# Patient Record
Sex: FEMALE | Race: WHITE | NOT HISPANIC OR LATINO | Employment: OTHER | URBAN - METROPOLITAN AREA
[De-identification: names, ages, dates, MRNs, and addresses within clinical notes are randomized per-mention and may not be internally consistent; named-entity substitution may affect disease eponyms.]

---

## 2017-05-11 ENCOUNTER — TRANSCRIBE ORDERS (OUTPATIENT)
Dept: ADMINISTRATIVE | Facility: HOSPITAL | Age: 67
End: 2017-05-11

## 2017-05-11 DIAGNOSIS — J98.4 CALCIFICATION OF LUNG: Primary | ICD-10-CM

## 2017-05-15 ENCOUNTER — HOSPITAL ENCOUNTER (OUTPATIENT)
Dept: ULTRASOUND IMAGING | Facility: HOSPITAL | Age: 67
Discharge: HOME | End: 2017-05-15
Admitting: INTERNAL MEDICINE

## 2017-05-15 DIAGNOSIS — J98.4 CALCIFICATION OF LUNG: ICD-10-CM

## 2017-05-15 PROCEDURE — 76536 US EXAM OF HEAD AND NECK: CPT

## 2017-12-11 ENCOUNTER — TRANSCRIBE ORDERS (OUTPATIENT)
Dept: ADMINISTRATIVE | Facility: HOSPITAL | Age: 67
End: 2017-12-11

## 2017-12-11 DIAGNOSIS — E04.1 THYROID NODULE: Primary | ICD-10-CM

## 2017-12-13 ENCOUNTER — HOSPITAL ENCOUNTER (OUTPATIENT)
Dept: ULTRASOUND IMAGING | Facility: HOSPITAL | Age: 67
Discharge: HOME | End: 2017-12-13
Admitting: INTERNAL MEDICINE

## 2017-12-13 DIAGNOSIS — E04.1 THYROID NODULE: ICD-10-CM

## 2017-12-13 PROCEDURE — 76536 US EXAM OF HEAD AND NECK: CPT

## 2018-01-04 ENCOUNTER — OUTSIDE FACILITY SERVICE (OUTPATIENT)
Dept: GASTROENTEROLOGY | Facility: CLINIC | Age: 68
End: 2018-01-04

## 2018-01-04 ENCOUNTER — LAB REQUISITION (OUTPATIENT)
Dept: LAB | Facility: HOSPITAL | Age: 68
End: 2018-01-04

## 2018-01-04 DIAGNOSIS — D12.5 BENIGN NEOPLASM OF SIGMOID COLON: ICD-10-CM

## 2018-01-04 PROCEDURE — 88305 TISSUE EXAM BY PATHOLOGIST: CPT | Performed by: INTERNAL MEDICINE

## 2018-01-04 PROCEDURE — G0500 MOD SEDAT ENDO SERVICE >5YRS: HCPCS | Performed by: INTERNAL MEDICINE

## 2018-01-04 PROCEDURE — 45385 COLONOSCOPY W/LESION REMOVAL: CPT | Performed by: INTERNAL MEDICINE

## 2018-01-05 LAB
CYTO UR: NORMAL
LAB AP CASE REPORT: NORMAL
LAB AP CLINICAL INFORMATION: NORMAL
Lab: NORMAL
PATH REPORT.FINAL DX SPEC: NORMAL
PATH REPORT.GROSS SPEC: NORMAL

## 2018-01-11 ENCOUNTER — TELEPHONE (OUTPATIENT)
Dept: GASTROENTEROLOGY | Facility: CLINIC | Age: 68
End: 2018-01-11

## 2018-01-11 NOTE — TELEPHONE ENCOUNTER
----- Message from Dusty Jimenez MD sent at 1/6/2018 10:12 AM EST -----  Please call Claudia and inform her polyp was benign. Her previous polyps were adenomas and a follow exam in 5 years is indicated.  Dr. Jimenez

## 2019-01-11 ENCOUNTER — TRANSCRIBE ORDERS (OUTPATIENT)
Dept: ADMINISTRATIVE | Facility: HOSPITAL | Age: 69
End: 2019-01-11

## 2019-01-11 DIAGNOSIS — E04.1 THYROID NODULE: Primary | ICD-10-CM

## 2019-01-16 ENCOUNTER — HOSPITAL ENCOUNTER (OUTPATIENT)
Dept: ULTRASOUND IMAGING | Facility: HOSPITAL | Age: 69
Discharge: HOME | End: 2019-01-16
Admitting: INTERNAL MEDICINE

## 2019-01-16 ENCOUNTER — TRANSCRIBE ORDERS (OUTPATIENT)
Dept: ADMINISTRATIVE | Facility: HOSPITAL | Age: 69
End: 2019-01-16

## 2019-01-16 DIAGNOSIS — E04.1 THYROID NODULE: ICD-10-CM

## 2019-01-16 DIAGNOSIS — E04.2 NONTOXIC MULTINODULAR GOITER: Primary | ICD-10-CM

## 2019-01-16 PROCEDURE — 76536 US EXAM OF HEAD AND NECK: CPT

## 2019-01-22 ENCOUNTER — APPOINTMENT (OUTPATIENT)
Dept: ULTRASOUND IMAGING | Facility: HOSPITAL | Age: 69
End: 2019-01-22

## 2019-05-03 ENCOUNTER — TRANSCRIBE ORDERS (OUTPATIENT)
Dept: ADMINISTRATIVE | Facility: HOSPITAL | Age: 69
End: 2019-05-03

## 2019-05-03 DIAGNOSIS — Z87.891 PERSONAL HISTORY OF TOBACCO USE, PRESENTING HAZARDS TO HEALTH: Primary | ICD-10-CM

## 2019-05-24 ENCOUNTER — HOSPITAL ENCOUNTER (OUTPATIENT)
Dept: CT IMAGING | Facility: HOSPITAL | Age: 69
Discharge: HOME | End: 2019-05-24
Admitting: INTERNAL MEDICINE

## 2019-05-24 DIAGNOSIS — Z87.891 PERSONAL HISTORY OF TOBACCO USE, PRESENTING HAZARDS TO HEALTH: ICD-10-CM

## 2019-05-24 PROCEDURE — G0297 LDCT FOR LUNG CA SCREEN: HCPCS

## 2020-07-29 ENCOUNTER — TRANSCRIBE ORDERS (OUTPATIENT)
Dept: ADMINISTRATIVE | Facility: HOSPITAL | Age: 70
End: 2020-07-29

## 2020-07-29 DIAGNOSIS — Z87.891 PERSONAL HISTORY OF TOBACCO USE, PRESENTING HAZARDS TO HEALTH: Primary | ICD-10-CM

## 2020-08-10 ENCOUNTER — HOSPITAL ENCOUNTER (OUTPATIENT)
Dept: CT IMAGING | Facility: HOSPITAL | Age: 70
Discharge: HOME | End: 2020-08-10
Admitting: INTERNAL MEDICINE

## 2020-08-10 DIAGNOSIS — Z87.891 PERSONAL HISTORY OF TOBACCO USE, PRESENTING HAZARDS TO HEALTH: ICD-10-CM

## 2020-08-10 PROCEDURE — G0297 LDCT FOR LUNG CA SCREEN: HCPCS

## 2022-05-13 ENCOUNTER — TRANSCRIBE ORDERS (OUTPATIENT)
Dept: CT IMAGING | Facility: HOSPITAL | Age: 72
End: 2022-05-13

## 2022-05-13 DIAGNOSIS — Z87.891 PERSONAL HISTORY OF TOBACCO USE, PRESENTING HAZARDS TO HEALTH: Primary | ICD-10-CM

## 2022-06-01 ENCOUNTER — HOSPITAL ENCOUNTER (OUTPATIENT)
Dept: CT IMAGING | Facility: HOSPITAL | Age: 72
Discharge: HOME | End: 2022-06-01
Admitting: INTERNAL MEDICINE

## 2022-06-01 DIAGNOSIS — Z87.891 PERSONAL HISTORY OF TOBACCO USE, PRESENTING HAZARDS TO HEALTH: ICD-10-CM

## 2022-06-01 PROCEDURE — 71271 CT THORAX LUNG CANCER SCR C-: CPT

## 2022-07-11 ENCOUNTER — TRANSCRIBE ORDERS (OUTPATIENT)
Dept: ADMINISTRATIVE | Facility: HOSPITAL | Age: 72
End: 2022-07-11

## 2022-07-11 DIAGNOSIS — M81.0 SENILE OSTEOPOROSIS: Primary | ICD-10-CM

## 2022-07-26 ENCOUNTER — TRANSCRIBE ORDERS (OUTPATIENT)
Dept: ADMINISTRATIVE | Facility: HOSPITAL | Age: 72
End: 2022-07-26

## 2022-07-26 DIAGNOSIS — Z87.891 PERSONAL HISTORY OF TOBACCO USE, PRESENTING HAZARDS TO HEALTH: Primary | ICD-10-CM

## 2022-09-09 ENCOUNTER — HOSPITAL ENCOUNTER (OUTPATIENT)
Dept: BONE DENSITY | Facility: HOSPITAL | Age: 72
Discharge: HOME | End: 2022-09-09
Admitting: INTERNAL MEDICINE

## 2022-09-09 DIAGNOSIS — M81.0 SENILE OSTEOPOROSIS: ICD-10-CM

## 2022-09-09 PROCEDURE — 77080 DXA BONE DENSITY AXIAL: CPT

## 2023-04-02 ENCOUNTER — APPOINTMENT (OUTPATIENT)
Dept: GENERAL RADIOLOGY | Facility: HOSPITAL | Age: 73
End: 2023-04-02
Payer: MEDICARE

## 2023-04-02 ENCOUNTER — HOSPITAL ENCOUNTER (EMERGENCY)
Facility: HOSPITAL | Age: 73
Discharge: HOME | End: 2023-04-02
Attending: EMERGENCY MEDICINE | Admitting: EMERGENCY MEDICINE
Payer: MEDICARE

## 2023-04-02 VITALS
HEART RATE: 95 BPM | WEIGHT: 130 LBS | TEMPERATURE: 98.5 F | BODY MASS INDEX: 23.92 KG/M2 | RESPIRATION RATE: 18 BRPM | HEIGHT: 62 IN | DIASTOLIC BLOOD PRESSURE: 85 MMHG | OXYGEN SATURATION: 99 % | SYSTOLIC BLOOD PRESSURE: 175 MMHG

## 2023-04-02 DIAGNOSIS — S56.429A EXTENSOR TENDON LACERATION, FINGER, OPEN WOUND, INITIAL ENCOUNTER: Primary | ICD-10-CM

## 2023-04-02 DIAGNOSIS — S61.212A LACERATION OF RIGHT MIDDLE FINGER WITHOUT FOREIGN BODY WITHOUT DAMAGE TO NAIL, INITIAL ENCOUNTER: ICD-10-CM

## 2023-04-02 DIAGNOSIS — S61.209A EXTENSOR TENDON LACERATION, FINGER, OPEN WOUND, INITIAL ENCOUNTER: Primary | ICD-10-CM

## 2023-04-02 PROCEDURE — 73140 X-RAY EXAM OF FINGER(S): CPT

## 2023-04-02 PROCEDURE — 99282 EMERGENCY DEPT VISIT SF MDM: CPT

## 2023-04-02 RX ORDER — LIDOCAINE HYDROCHLORIDE 10 MG/ML
10 INJECTION, SOLUTION EPIDURAL; INFILTRATION; INTRACAUDAL; PERINEURAL ONCE
Status: COMPLETED | OUTPATIENT
Start: 2023-04-02 | End: 2023-04-02

## 2023-04-02 RX ADMIN — LIDOCAINE HYDROCHLORIDE 10 ML: 10 INJECTION, SOLUTION EPIDURAL; INFILTRATION; INTRACAUDAL; PERINEURAL at 20:40

## 2023-07-25 ENCOUNTER — TRANSCRIBE ORDERS (OUTPATIENT)
Dept: ADMINISTRATIVE | Facility: HOSPITAL | Age: 73
End: 2023-07-25
Payer: MEDICARE

## 2023-07-25 DIAGNOSIS — Z87.891 PERSONAL HISTORY OF NICOTINE DEPENDENCE: Primary | ICD-10-CM

## 2023-08-09 ENCOUNTER — HOSPITAL ENCOUNTER (OUTPATIENT)
Dept: CT IMAGING | Facility: HOSPITAL | Age: 73
Discharge: HOME | End: 2023-08-09
Admitting: INTERNAL MEDICINE
Payer: MEDICARE

## 2023-08-09 DIAGNOSIS — Z87.891 PERSONAL HISTORY OF NICOTINE DEPENDENCE: ICD-10-CM

## 2023-08-09 PROCEDURE — 71271 CT THORAX LUNG CANCER SCR C-: CPT

## 2023-11-14 RX ORDER — SODIUM PICOSULFATE, MAGNESIUM OXIDE, AND ANHYDROUS CITRIC ACID 12; 3.5; 1 G/175ML; G/175ML; MG/175ML
350 LIQUID ORAL ONCE
Qty: 350 ML | Refills: 0 | Status: SHIPPED | OUTPATIENT
Start: 2023-11-14 | End: 2023-11-14

## 2023-11-21 ENCOUNTER — OUTSIDE FACILITY SERVICE (OUTPATIENT)
Dept: GASTROENTEROLOGY | Facility: CLINIC | Age: 73
End: 2023-11-21
Payer: MEDICARE

## 2023-11-21 PROCEDURE — G0500 MOD SEDAT ENDO SERVICE >5YRS: HCPCS | Performed by: INTERNAL MEDICINE

## 2023-11-21 PROCEDURE — 88305 TISSUE EXAM BY PATHOLOGIST: CPT

## 2023-11-21 PROCEDURE — 45385 COLONOSCOPY W/LESION REMOVAL: CPT | Performed by: INTERNAL MEDICINE

## 2023-11-22 ENCOUNTER — LAB REQUISITION (OUTPATIENT)
Dept: LAB | Facility: HOSPITAL | Age: 73
End: 2023-11-22
Payer: MEDICARE

## 2023-11-22 DIAGNOSIS — Z12.11 ENCOUNTER FOR SCREENING FOR MALIGNANT NEOPLASM OF COLON: ICD-10-CM

## 2023-11-24 LAB — REF LAB TEST METHOD: NORMAL

## 2024-02-01 ENCOUNTER — HOSPITAL ENCOUNTER (OUTPATIENT)
Dept: GENERAL RADIOLOGY | Facility: HOSPITAL | Age: 74
Discharge: HOME | End: 2024-02-01
Admitting: INTERNAL MEDICINE
Payer: MEDICARE

## 2024-02-01 ENCOUNTER — TRANSCRIBE ORDERS (OUTPATIENT)
Dept: ADMINISTRATIVE | Facility: HOSPITAL | Age: 74
End: 2024-02-01
Payer: MEDICARE

## 2024-02-01 DIAGNOSIS — M25.552 HIP PAIN, LEFT: Primary | ICD-10-CM

## 2024-02-01 DIAGNOSIS — M25.552 HIP PAIN, LEFT: ICD-10-CM

## 2024-02-01 PROCEDURE — 73502 X-RAY EXAM HIP UNI 2-3 VIEWS: CPT

## 2024-06-03 ENCOUNTER — TRANSCRIBE ORDERS (OUTPATIENT)
Dept: ADMINISTRATIVE | Facility: HOSPITAL | Age: 74
End: 2024-06-03
Payer: MEDICARE

## 2024-06-03 DIAGNOSIS — R10.814 ABDOMINAL TENDERNESS OF LEFT LOWER QUADRANT, REBOUND TENDERNESS PRESENCE NOT SPECIFIED: Primary | ICD-10-CM

## 2024-07-25 ENCOUNTER — TRANSCRIBE ORDERS (OUTPATIENT)
Dept: ADMINISTRATIVE | Facility: HOSPITAL | Age: 74
End: 2024-07-25
Payer: MEDICARE

## 2024-07-25 DIAGNOSIS — Z87.891 PERSONAL HISTORY OF TOBACCO USE, PRESENTING HAZARDS TO HEALTH: Primary | ICD-10-CM

## 2024-08-12 ENCOUNTER — HOSPITAL ENCOUNTER (OUTPATIENT)
Dept: CT IMAGING | Facility: HOSPITAL | Age: 74
Discharge: HOME | End: 2024-08-12
Admitting: INTERNAL MEDICINE
Payer: MEDICARE

## 2024-08-12 DIAGNOSIS — Z87.891 PERSONAL HISTORY OF TOBACCO USE, PRESENTING HAZARDS TO HEALTH: ICD-10-CM

## 2024-08-12 PROCEDURE — 71271 CT THORAX LUNG CANCER SCR C-: CPT

## 2024-09-20 ENCOUNTER — TRANSCRIBE ORDERS (OUTPATIENT)
Dept: ADMINISTRATIVE | Facility: HOSPITAL | Age: 74
End: 2024-09-20
Payer: MEDICARE

## 2024-09-20 DIAGNOSIS — R10.30 LOWER ABDOMINAL PAIN: Primary | ICD-10-CM

## 2024-09-24 ENCOUNTER — HOSPITAL ENCOUNTER (OUTPATIENT)
Dept: CT IMAGING | Facility: HOSPITAL | Age: 74
Discharge: HOME | End: 2024-09-24
Admitting: INTERNAL MEDICINE
Payer: MEDICARE

## 2024-09-24 DIAGNOSIS — R10.30 LOWER ABDOMINAL PAIN: ICD-10-CM

## 2024-09-24 PROCEDURE — 74176 CT ABD & PELVIS W/O CONTRAST: CPT

## 2024-09-25 ENCOUNTER — HOSPITAL ENCOUNTER (OUTPATIENT)
Dept: MRI IMAGING | Facility: HOSPITAL | Age: 74
Discharge: HOME | End: 2024-09-25
Admitting: INTERNAL MEDICINE
Payer: MEDICARE

## 2024-09-25 ENCOUNTER — TRANSCRIBE ORDERS (OUTPATIENT)
Dept: ADMINISTRATIVE | Facility: HOSPITAL | Age: 74
End: 2024-09-25
Payer: MEDICARE

## 2024-09-25 DIAGNOSIS — R93.3 ABNORMAL FINDINGS ON DIAGNOSTIC IMAGING OF DIGESTIVE SYSTEM: Primary | ICD-10-CM

## 2024-09-25 DIAGNOSIS — R93.3 ABNORMAL FINDINGS ON DIAGNOSTIC IMAGING OF DIGESTIVE SYSTEM: ICD-10-CM

## 2024-09-25 PROCEDURE — A9577 INJ MULTIHANCE: HCPCS | Performed by: INTERNAL MEDICINE

## 2024-09-25 PROCEDURE — 0 GADOBENATE DIMEGLUMINE 529 MG/ML SOLUTION: Performed by: INTERNAL MEDICINE

## 2024-09-25 PROCEDURE — 74183 MRI ABD W/O CNTR FLWD CNTR: CPT

## 2024-09-25 RX ADMIN — GADOBENATE DIMEGLUMINE 14 ML: 529 INJECTION, SOLUTION INTRAVENOUS at 21:29

## 2024-10-03 ENCOUNTER — PATIENT OUTREACH (OUTPATIENT)
Dept: OTHER | Facility: HOSPITAL | Age: 74
End: 2024-10-03
Payer: MEDICARE

## 2024-10-03 ENCOUNTER — CONSULT (OUTPATIENT)
Dept: ONCOLOGY | Facility: CLINIC | Age: 74
End: 2024-10-03
Payer: MEDICARE

## 2024-10-03 ENCOUNTER — LAB (OUTPATIENT)
Dept: LAB | Facility: HOSPITAL | Age: 74
End: 2024-10-03
Payer: MEDICARE

## 2024-10-03 VITALS
DIASTOLIC BLOOD PRESSURE: 77 MMHG | HEIGHT: 62 IN | HEART RATE: 98 BPM | WEIGHT: 116 LBS | OXYGEN SATURATION: 98 % | BODY MASS INDEX: 21.35 KG/M2 | TEMPERATURE: 97.5 F | RESPIRATION RATE: 16 BRPM | SYSTOLIC BLOOD PRESSURE: 118 MMHG

## 2024-10-03 DIAGNOSIS — C25.0 MALIGNANT TUMOR OF HEAD OF PANCREAS: Primary | ICD-10-CM

## 2024-10-03 DIAGNOSIS — C25.0 MALIGNANT NEOPLASM OF HEAD OF PANCREAS: Primary | ICD-10-CM

## 2024-10-03 PROCEDURE — 36415 COLL VENOUS BLD VENIPUNCTURE: CPT

## 2024-10-03 RX ORDER — OXYCODONE HYDROCHLORIDE 15 MG/1
15 TABLET ORAL EVERY 6 HOURS PRN
Qty: 120 TABLET | Refills: 0 | Status: SHIPPED | OUTPATIENT
Start: 2024-10-03 | End: 2024-10-04 | Stop reason: SDUPTHER

## 2024-10-03 RX ORDER — SODIUM CHLORIDE 9 MG/ML
20 INJECTION, SOLUTION INTRAVENOUS ONCE
OUTPATIENT
Start: 2024-10-10

## 2024-10-03 RX ORDER — PACLITAXEL 100 MG/20ML
125 INJECTION, POWDER, LYOPHILIZED, FOR SUSPENSION INTRAVENOUS ONCE
OUTPATIENT
Start: 2024-11-21

## 2024-10-03 RX ORDER — LEVOTHYROXINE SODIUM 50 UG/1
50 TABLET ORAL
COMMUNITY

## 2024-10-03 RX ORDER — SODIUM CHLORIDE 9 MG/ML
20 INJECTION, SOLUTION INTRAVENOUS ONCE
OUTPATIENT
Start: 2024-10-24

## 2024-10-03 RX ORDER — PACLITAXEL 100 MG/20ML
125 INJECTION, POWDER, LYOPHILIZED, FOR SUSPENSION INTRAVENOUS ONCE
OUTPATIENT
Start: 2024-10-10

## 2024-10-03 RX ORDER — PACLITAXEL 100 MG/20ML
125 INJECTION, POWDER, LYOPHILIZED, FOR SUSPENSION INTRAVENOUS ONCE
OUTPATIENT
Start: 2024-11-07

## 2024-10-03 RX ORDER — TRAMADOL HYDROCHLORIDE 50 MG/1
1 TABLET ORAL
COMMUNITY
Start: 2024-09-28

## 2024-10-03 RX ORDER — ROSUVASTATIN CALCIUM 5 MG/1
5 TABLET, COATED ORAL DAILY
COMMUNITY

## 2024-10-03 RX ORDER — AMLODIPINE AND BENAZEPRIL HYDROCHLORIDE 5; 10 MG/1; MG/1
1 CAPSULE ORAL DAILY
COMMUNITY
Start: 2022-03-31

## 2024-10-03 RX ORDER — PACLITAXEL 100 MG/20ML
125 INJECTION, POWDER, LYOPHILIZED, FOR SUSPENSION INTRAVENOUS ONCE
OUTPATIENT
Start: 2024-10-24

## 2024-10-03 RX ORDER — SODIUM CHLORIDE 9 MG/ML
20 INJECTION, SOLUTION INTRAVENOUS ONCE
OUTPATIENT
Start: 2024-11-21

## 2024-10-03 RX ORDER — SODIUM CHLORIDE 9 MG/ML
20 INJECTION, SOLUTION INTRAVENOUS ONCE
OUTPATIENT
Start: 2024-11-07

## 2024-10-03 NOTE — PROGRESS NOTES
ID: 73 y.o. year old female from [place] [id number]    PCP: Jeannie Salgado MD    REFERRING PHYSICIAN: Jeannie aSlgado MD    Reason for Consultation: Metastatic adenocarcinoma the pancreas    Dear Dr. Salgado    It is a pleasure to meet Mrs. Flores today.  She is a very pleasant 73-year-old lady presents today for consultation for newly diagnosed metastatic adenocarcinoma the pancreas.  She  presented with about a 10 pound weight loss over the last 4 months.  She was also having some abdominal pain that was midsternum radiating to the back.  Subsequently she underwent a CAT scan of her chest which revealed some small nodules in her lungs concerning for metastatic disease.  CAT scan of the abdomen showed a pancreatic head mass.  Subsequent MRI showed liver metastases present.  CA 19-9 was 26,000.  This is very consistent with a metastatic adenocarcinoma the pancreas.  She presents today to discuss treatment and prognosis.  Her biggest issue has been pain for which she has been taking a fair bit of Tylenol.  She is also has poor appetite and anorexia.  Her grandmother was diagnosed with ovarian cancer in the past.  Her mother is still alive at 94.      Past Medical History:   Diagnosis Date    Cancer     skin     Disease of thyroid gland     Hyperlipidemia     Hypertension     Pancreatic mass        Past Surgical History:   Procedure Laterality Date    APPENDECTOMY  2000    TONSILLECTOMY  1974    in [facility]    TOTAL HIP ARTHROPLASTY Left 03/2024       Social History     Socioeconomic History    Marital status:    Tobacco Use    Smoking status: Every Day     Current packs/day: 1.00     Types: Cigarettes    Smokeless tobacco: Never   Vaping Use    Vaping status: Never Used   Substance and Sexual Activity    Alcohol use: Not Currently     Comment: 2 glasses of wine daily    Drug use: Never    Sexual activity: Defer       Family History   Problem Relation Age of Onset    Hypertension Mother     Heart disease Father      Parkinsonism Father        Review of Systems:    16 point review of systems was performed and reviewed and scanned into the EMR    Review of Systems - Oncology      Current Outpatient Medications:     Acetaminophen (TYLENOL EXTRA STRENGTH PO), Take 3 tablets by mouth 3 times a day., Disp: , Rfl:     amLODIPine-benazepril (LOTREL 5-10) 5-10 MG per capsule, Take 1 capsule by mouth Daily., Disp: , Rfl:     levothyroxine (SYNTHROID, LEVOTHROID) 50 MCG tablet, Take 1 tablet by mouth., Disp: , Rfl:     rosuvastatin (CRESTOR) 5 MG tablet, Take 1 tablet by mouth Daily., Disp: , Rfl:     traMADol (ULTRAM) 50 MG tablet, Take 1 tablet by mouth 6 (Six) Times a Day., Disp: , Rfl:     oxyCODONE (ROXICODONE) 15 MG immediate release tablet, Take 1 tablet by mouth Every 6 (Six) Hours As Needed for Moderate Pain., Disp: 120 tablet, Rfl: 0    Pain Medications               Acetaminophen (TYLENOL EXTRA STRENGTH PO) Take 3 tablets by mouth 3 times a day.    traMADol (ULTRAM) 50 MG tablet Take 1 tablet by mouth 6 (Six) Times a Day.    oxyCODONE (ROXICODONE) 15 MG immediate release tablet Take 1 tablet by mouth Every 6 (Six) Hours As Needed for Moderate Pain.             No Known Allergies      ECOG score: 0           Objective     Vitals:    10/03/24 1103   BP: 118/77   Pulse: 98   Resp: 16   Temp: 97.5 °F (36.4 °C)   SpO2: 98%     Body mass index is 21.22 kg/m².  Body surface area is 1.52 meters squared.        10/03/24  1103   Weight: 52.6 kg (116 lb)     Pain Score    10/03/24 1103   PainSc:   5   PainLoc: Abdomen          Physical Exam    General: Thin appearing, in no acute distress  HEENT: sclera anicteric, oropharynx clear, neck is supple  Lymphatics: no cervical, supraclavicular, or axillary adenopathy  Cardiovascular: regular rate and rhythm, no murmurs, rubs or gallops  Lungs: clear to auscultation bilaterally  Abdomen: soft, nontender, nondistended.  No palpable organomegaly  Extremities: no lower extremity edema  Skin: no  rashes, lesions, bruising, or petechiae  Msk:  Shows no weakness of the large muscle groups  Psych: Mood is stable          Mammo Screening Digital Tomosynthesis Bilateral With CAD    Result Date: 10/1/2024  FINAL IMPRESSION: ACR BI-RADS 2: Benign findings. RECOMMENDATIONS: Routine annual screening mammography. A letter including results and recommendations was sent to the patient. Density notification was provided to all patients. Patient information entered into a reminder system with a target due date for the next mammogram. At our facility, a [place] marker is positioned over a visible skin lesion and a linear marker is used to indicate a scar. A triangular marker is placed on a self reported palpable finding. Note: Mammography does not detect approximately 10-15% of breast cancers. An annual clinical breast exam by the patient's breast care physician and regular monthly self breast exams by the patient are integral parts of breast cancer screening, in addition to annual mammography. A normal mammogram does not completely exclude the presence of breast cancer, especially if there is an abnormal finding on physical exam. When clinically indicated, a biopsy should not be deferred because of a normal mammogram report. cc:     MRI Abdomen With & Without Contrast    Result Date: 9/25/2024  Impression: Pancreatic head mass measuring around 3 cm, likely adenocarcinoma. There is resultant pancreaticobiliary ductal obstruction as well as portosplenomesenteric venous occlusion. Innumerable hepatic metastases. Suspected omental nodularity and mesenteric lymphadenopathy, which may represent disease involvement. Right lower lobe pulmonary nodules. Electronically Signed: Merrill Duncan MD  9/25/2024 11:59 PM EDT  Workstation ID: [id number]    CT Abdomen Pelvis Without Contrast    Result Date: 9/24/2024  Impression: 1.There appears to be irregular enlargement of the pancreatic head concerning for pancreatic neoplasm with mild  pancreatic duct dilation. MRI abdomen with and without contrast recommended. 2.Nonspecific central mesenteric edema with few anterior omental vague soft tissue density nodules which may represent lymph nodes. Omental metastatic implants not excluded 3.Slightly larger right lower lobe pulmonary nodules. Metastatic disease is a consideration. Electronically Signed: Kalen Murray MD  9/24/2024 3:39 PM EDT  Workstation ID: [id number]    CT Chest Low Dose Cancer Screening WO    Result Date: 8/14/2024  Impression: 1. There are several new subcentimeter subpleural noncalcified nodules in the right lower lobe and one in the right upper lobe measuring up to 5 mm. These are favored to be infectious or inflammatory in etiology but close follow-up is recommended. These are below the resolution for PET. A follow-up chest CT in 6 months is recommended. 2.Mild emphysema with stable biapical pleural thickening and evidence of prior granulomatous disease are unchanged. 3. Recommendation: 6 month follow up with LDCT Lung Rads Assessment: Lung-RADS L3 - Probably benign, 1-2% chance of malignancy. Electronically Signed: Puneet Hagen DO  8/14/2024 10:16 PM EDT  Workstation ID: [id number]        Assessment      1.  Stage IV adenocarcinoma of the pancreas.  I do not feel that she needs a biopsy to confirm this diagnosis.  Her tumor markers and findings on imaging is consistent with an adenocarcinoma of the pancreas.  She has stage IV disease with liver metastases.  Treatment will be palliative.  Due to her age I would recommend gemcitabine with Abraxane.  I feel that FOLFOXIRI may be a little toxic for her at this age and with the absolute benefit of treatment would be fairly small.  Though I may consider it if she does not respond to Abraxane and gemcitabine at dose reduced levels.  She is going to need a port for her treatment.  I will send [employer] sequencing off her blood since there is a possibility she may have an underlying BRCA  mutation with a grandmother having ovarian cancer.  I am going to switch her pain medicines to oxycodones without the Tylenol component.  I will also have her see palliative care to help manage her pain and anxiety.  I plan to initiate therapy next week.  Had a long discussion with her and her  regarding prognosis of this cancer itself.  This is not curable and treatment is palliative in nature and does extend life.  Without treatment I suspect she has less than 6 months of survival.  However with treatment that may be extended.    Total time of patient care on day of service including time prior to, face to face with patient, and following visit spent in reviewing records, lab results, imaging studies, discussion with patient, and documentation/charting was > 80 minutes.            Thank you for allowing me to participate in the care of this patient.    Yours sincerely,    Ni Jackson MD    Hematology and Oncology    Return on: 10/24/24  Return in (Approximately): Schedule with next infusion, 3 weeks    Orders Placed This Encounter   Procedures    Comprehensive metabolic panel     Standing Status:   Future     Standing Expiration Date:   10/10/2025     Order Specific Question:   Release to patient     Answer:   Routine Release [[id number]]    Comprehensive metabolic panel     Standing Status:   Future     Standing Expiration Date:   10/24/2025     Order Specific Question:   Release to patient     Answer:   Routine Release [[id number]]    Cancer Antigen 19-9     Standing Status:   Future     Standing Expiration Date:   10/10/2025     Order Specific Question:   Release to patient     Answer:   Routine Release [[id number]]    Cancer Antigen 19-9     Standing Status:   Future     Standing Expiration Date:   11/7/2025     Order Specific Question:   Release to patient     Answer:   Routine Release [[id number]]    Comprehensive metabolic panel     Standing Status:   Future      Standing Expiration Date:   11/7/2025     Order Specific Question:   Release to patient     Answer:   Routine Release [[id number]]    Comprehensive metabolic panel     Standing Status:   Future     Standing Expiration Date:   11/21/2025     Order Specific Question:   Release to patient     Answer:   Routine Release [[id number]]    Ambulatory Referral to [facility] for Port-a-Cath     Referral Priority:   Routine     Referral Type:   Consultation     Referral Reason:   Specialty Services Required     Ref[facility]   [facility]     Requested Specialty:   General Surgery     Number of Visits Requested:   1    Ambulatory Referral to Palliative Care     Referral Priority:   Routine     Referral Type:   Hospice     Referral Reason:   Specialty Services Required     Requested Specialty:   Hospice and Palliative Medicine     Number of Visits Requested:   1    CBC and Differential     Standing Status:   Future     Standing Expiration Date:   10/10/2025     Order Specific Question:   Manual Differential     Answer:   No     Order Specific Question:   Release to patient     Answer:   Routine Release [[id number]]    CBC and Differential     Standing Status:   Future     Standing Expiration Date:   10/24/2025     Order Specific Question:   Manual Differential     Answer:   No     Order Specific Question:   Release to patient     Answer:   Routine Release [[id number]]    CBC and Differential     Standing Status:   Future     Standing Expiration Date:   11/7/2025     Order Specific Question:   Manual Differential     Answer:   No     Order Specific Question:   Release to patient     Answer:   Routine Release [[id number]]    CBC and Differential     Standing Status:   Future     Standing Expiration Date:   11/21/2025     Order Specific Question:   Manual Differential     Answer:   No     Order Specific Question:   Release to patient     Answer:   Routine Release [[id number]]

## 2024-10-03 NOTE — LETTER
October 3, 2024       No Recipients    Patient: Claudia Flores   YOB: 1950   Date of Visit: 10/3/2024     Dear Jeannie Salgado MD:       Thank you for referring Claudia Flores to me for evaluation. Below are the relevant portions of my assessment and plan of care.    If you have questions, please do not hesitate to call me. I look forward to following Claudia along with you.         Sincerely,        Ni Jackson MD        CC:   No Recipients    Ni Jackson MD  10/03/24 1201  Sign when Signing Visit  ID: 73 y.o. year old female from [place] [id number]    PCP: Jeannie Salgado MD    REFERRING PHYSICIAN: Jeannie Salgado MD    Reason for Consultation: Metastatic adenocarcinoma the pancreas    Dear Dr. Salgado    It is a pleasure to meet Mrs. Flores today.  She is a very pleasant 73-year-old lady presents today for consultation for newly diagnosed metastatic adenocarcinoma the pancreas.  She  presented with about a 10 pound weight loss over the last 4 months.  She was also having some abdominal pain that was midsternum radiating to the back.  Subsequently she underwent a CAT scan of her chest which revealed some small nodules in her lungs concerning for metastatic disease.  CAT scan of the abdomen showed a pancreatic head mass.  Subsequent MRI showed liver metastases present.  CA 19-9 was 26,000.  This is very consistent with a metastatic adenocarcinoma the pancreas.  She presents today to discuss treatment and prognosis.  Her biggest issue has been pain for which she has been taking a fair bit of Tylenol.  She is also has poor appetite and anorexia.  Her grandmother was diagnosed with ovarian cancer in the past.  Her mother is still alive at 94.      Past Medical History:   Diagnosis Date   • Cancer     skin    • Disease of thyroid gland    • Hyperlipidemia    • Hypertension    • Pancreatic mass        Past Surgical History:   Procedure Laterality Date   • APPENDECTOMY  2000   •  TONSILLECTOMY  1974    in [facility]   • TOTAL HIP ARTHROPLASTY Left 03/2024       Social History     Socioeconomic History   • Marital status:    Tobacco Use   • Smoking status: Every Day     Current packs/day: 1.00     Types: Cigarettes   • Smokeless tobacco: Never   Vaping Use   • Vaping status: Never Used   Substance and Sexual Activity   • Alcohol use: Not Currently     Comment: 2 glasses of wine daily   • Drug use: Never   • Sexual activity: Defer       Family History   Problem Relation Age of Onset   • Hypertension Mother    • Heart disease Father    • Parkinsonism Father        Review of Systems:    16 point review of systems was performed and reviewed and scanned into the EMR    Review of Systems - Oncology      Current Outpatient Medications:   •  Acetaminophen (TYLENOL EXTRA STRENGTH PO), Take 3 tablets by mouth 3 times a day., Disp: , Rfl:   •  amLODIPine-benazepril (LOTREL 5-10) 5-10 MG per capsule, Take 1 capsule by mouth Daily., Disp: , Rfl:   •  levothyroxine (SYNTHROID, LEVOTHROID) 50 MCG tablet, Take 1 tablet by mouth., Disp: , Rfl:   •  rosuvastatin (CRESTOR) 5 MG tablet, Take 1 tablet by mouth Daily., Disp: , Rfl:   •  traMADol (ULTRAM) 50 MG tablet, Take 1 tablet by mouth 6 (Six) Times a Day., Disp: , Rfl:   •  oxyCODONE (ROXICODONE) 15 MG immediate release tablet, Take 1 tablet by mouth Every 6 (Six) Hours As Needed for Moderate Pain., Disp: 120 tablet, Rfl: 0    Pain Medications               Acetaminophen (TYLENOL EXTRA STRENGTH PO) Take 3 tablets by mouth 3 times a day.    traMADol (ULTRAM) 50 MG tablet Take 1 tablet by mouth 6 (Six) Times a Day.    oxyCODONE (ROXICODONE) 15 MG immediate release tablet Take 1 tablet by mouth Every 6 (Six) Hours As Needed for Moderate Pain.             No Known Allergies      ECOG score: 0           Objective    Vitals:    10/03/24 1103   BP: 118/77   Pulse: 98   Resp: 16   Temp: 97.5 °F (36.4 °C)   SpO2: 98%     Body mass index is 21.22 kg/m².  Body  surface area is 1.52 meters squared.        10/03/24  1103   Weight: 52.6 kg (116 lb)     Pain Score    10/03/24 1103   PainSc:   5   PainLoc: Abdomen          Physical Exam    General: Thin appearing, in no acute distress  HEENT: sclera anicteric, oropharynx clear, neck is supple  Lymphatics: no cervical, supraclavicular, or axillary adenopathy  Cardiovascular: regular rate and rhythm, no murmurs, rubs or gallops  Lungs: clear to auscultation bilaterally  Abdomen: soft, nontender, nondistended.  No palpable organomegaly  Extremities: no lower extremity edema  Skin: no rashes, lesions, bruising, or petechiae  Msk:  Shows no weakness of the large muscle groups  Psych: Mood is stable          Mammo Screening Digital Tomosynthesis Bilateral With CAD    Result Date: 10/1/2024  FINAL IMPRESSION: ACR BI-RADS 2: Benign findings. RECOMMENDATIONS: Routine annual screening mammography. A letter including results and recommendations was sent to the patient. Density notification was provided to all patients. Patient information entered into a reminder system with a target due date for the next mammogram. At our facility, a Council marker is positioned over a visible skin lesion and a linear marker is used to indicate a scar. A triangular marker is placed on a self reported palpable finding. Note: Mammography does not detect approximately 10-15% of breast cancers. An annual clinical breast exam by the patient's breast care physician and regular monthly self breast exams by the patient are integral parts of breast cancer screening, in addition to annual mammography. A normal mammogram does not completely exclude the presence of breast cancer, especially if there is an abnormal finding on physical exam. When clinically indicated, a biopsy should not be deferred because of a normal mammogram report. cc:     MRI Abdomen With & Without Contrast    Result Date: 9/25/2024  Impression: Pancreatic head mass measuring around 3 cm, likely  adenocarcinoma. There is resultant pancreaticobiliary ductal obstruction as well as portosplenomesenteric venous occlusion. Innumerable hepatic metastases. Suspected omental nodularity and mesenteric lymphadenopathy, which may represent disease involvement. Right lower lobe pulmonary nodules. Electronically Signed: Merrill Duncan MD  9/25/2024 11:59 PM EDT  Workstation ID: [id number]    CT Abdomen Pelvis Without Contrast    Result Date: 9/24/2024  Impression: 1.There appears to be irregular enlargement of the pancreatic head concerning for pancreatic neoplasm with mild pancreatic duct dilation. MRI abdomen with and without contrast recommended. 2.Nonspecific central mesenteric edema with few anterior omental vague soft tissue density nodules which may represent lymph nodes. Omental metastatic implants not excluded 3.Slightly larger right lower lobe pulmonary nodules. Metastatic disease is a consideration. Electronically Signed: Kalen Murray MD  9/24/2024 3:39 PM EDT  Workstation ID: [id number]    CT Chest Low Dose Cancer Screening WO    Result Date: 8/14/2024  Impression: 1. There are several new subcentimeter subpleural noncalcified nodules in the right lower lobe and one in the right upper lobe measuring up to 5 mm. These are favored to be infectious or inflammatory in etiology but close follow-up is recommended. These are below the resolution for PET. A follow-up chest CT in 6 months is recommended. 2.Mild emphysema with stable biapical pleural thickening and evidence of prior granulomatous disease are unchanged. 3. Recommendation: 6 month follow up with LDCT Lung Rads Assessment: Lung-RADS L3 - Probably benign, 1-2% chance of malignancy. Electronically Signed: Puneet Hagen DO  8/14/2024 10:16 PM EDT  Workstation ID: [id number]        Assessment     1.  Stage IV adenocarcinoma of the pancreas.  I do not feel that she needs a biopsy to confirm this diagnosis.  Her tumor markers and findings on imaging is consistent  with an adenocarcinoma of the pancreas.  She has stage IV disease with liver metastases.  Treatment will be palliative.  Due to her age I would recommend gemcitabine with Abraxane.  I feel that FOLFOXIRI may be a little toxic for her at this age and with the absolute benefit of treatment would be fairly small.  Though I may consider it if she does not respond to Abraxane and gemcitabine at dose reduced levels.  She is going to need a port for her treatment.  I will send [employer] sequencing off her blood since there is a possibility she may have an underlying BRCA mutation with a grandmother having ovarian cancer.  I am going to switch her pain medicines to oxycodones without the Tylenol component.  I will also have her see palliative care to help manage her pain and anxiety.  I plan to initiate therapy next week.  Had a long discussion with her and her  regarding prognosis of this cancer itself.  This is not curable and treatment is palliative in nature and does extend life.  Without treatment I suspect she has less than 6 months of survival.  However with treatment that may be extended.    Total time of patient care on day of service including time prior to, face to face with patient, and following visit spent in reviewing records, lab results, imaging studies, discussion with patient, and documentation/charting was > 80 minutes.            Thank you for allowing me to participate in the care of this patient.    Yours sincerely,    iN Jackson MD  [facility]  Hematology and Oncology    Return on: 10/24/24  Return in (Approximately): Schedule with next infusion, 3 weeks    Orders Placed This Encounter   Procedures   • Comprehensive metabolic panel     Standing Status:   Future     Standing Expiration Date:   10/10/2025     Order Specific Question:   Release to patient     Answer:   Routine Release [[id number]]   • Comprehensive metabolic panel     Standing Status:   Future     Standing  Expiration Date:   10/24/2025     Order Specific Question:   Release to patient     Answer:   Routine Release [[id number]]   • Cancer Antigen 19-9     Standing Status:   Future     Standing Expiration Date:   10/10/2025     Order Specific Question:   Release to patient     Answer:   Routine Release [[id number]]   • Cancer Antigen 19-9     Standing Status:   Future     Standing Expiration Date:   11/7/2025     Order Specific Question:   Release to patient     Answer:   Routine Release [[id number]]   • Comprehensive metabolic panel     Standing Status:   Future     Standing Expiration Date:   11/7/2025     Order Specific Question:   Release to patient     Answer:   Routine Release [[id number]]   • Comprehensive metabolic panel     Standing Status:   Future     Standing Expiration Date:   11/21/2025     Order Specific Question:   Release to patient     Answer:   Routine Release [[id number]]   • Ambulatory Referral to [facility] for Port-a-Cath     Referral Priority:   Routine     Referral Type:   Consultation     Referral Reason:   Specialty Services Required     [facility]     Requested Specialty:   General Surgery     Number of Visits Requested:   1   • Ambulatory Referral to Palliative Care     Referral Priority:   Routine     Referral Type:   Hospice     Referral Reason:   Specialty Services Required     Requested Specialty:   Hospice and Palliative Medicine     Number of Visits Requested:   1   • CBC and Differential     Standing Status:   Future     Standing Expiration Date:   10/10/2025     Order Specific Question:   Manual Differential     Answer:   No     Order Specific Question:   Release to patient     Answer:   Routine Release [[id number]]   • CBC and Differential     Standing Status:   Future     Standing Expiration Date:   10/24/2025     Order Specific Question:   Manual Differential     Answer:   No     Order Specific Question:   Release to patient     Answer:   Routine Release  [[id number]]   • CBC and Differential     Standing Status:   Future     Standing Expiration Date:   11/7/2025     Order Specific Question:   Manual Differential     Answer:   No     Order Specific Question:   Release to patient     Answer:   Routine Release [[id number]]   • CBC and Differential     Standing Status:   Future     Standing Expiration Date:   11/21/2025     Order Specific Question:   Manual Differential     Answer:   No     Order Specific Question:   Release to patient     Answer:   Routine Release [[id number]]

## 2024-10-04 ENCOUNTER — TELEPHONE (OUTPATIENT)
Dept: ONCOLOGY | Facility: CLINIC | Age: 74
End: 2024-10-04
Payer: MEDICARE

## 2024-10-04 ENCOUNTER — PATIENT OUTREACH (OUTPATIENT)
Dept: OTHER | Facility: HOSPITAL | Age: 74
End: 2024-10-04
Payer: MEDICARE

## 2024-10-04 ENCOUNTER — TRANSCRIBE ORDERS (OUTPATIENT)
Dept: GENERAL RADIOLOGY | Facility: HOSPITAL | Age: 74
End: 2024-10-04
Payer: MEDICARE

## 2024-10-04 DIAGNOSIS — C25.0 MALIGNANT NEOPLASM OF HEAD OF PANCREAS: Primary | ICD-10-CM

## 2024-10-04 DIAGNOSIS — C25.0 MALIGNANT TUMOR OF HEAD OF PANCREAS: ICD-10-CM

## 2024-10-04 RX ORDER — OXYCODONE HYDROCHLORIDE 10 MG/1
10 TABLET ORAL EVERY 6 HOURS PRN
Qty: 120 TABLET | Refills: 0 | Status: SHIPPED | OUTPATIENT
Start: 2024-10-04

## 2024-10-04 RX ORDER — OXYCODONE HYDROCHLORIDE 15 MG/1
15 TABLET ORAL EVERY 6 HOURS PRN
Qty: 120 TABLET | Refills: 0 | Status: SHIPPED | OUTPATIENT
Start: 2024-10-04 | End: 2024-10-04

## 2024-10-04 RX ORDER — ONDANSETRON 8 MG/1
8 TABLET, ORALLY DISINTEGRATING ORAL EVERY 8 HOURS PRN
Qty: 30 TABLET | Refills: 5 | Status: SHIPPED | OUTPATIENT
Start: 2024-10-04

## 2024-10-04 NOTE — TELEPHONE ENCOUNTER
Called patient at 10:00 am informing her that script was discontinued and message left for Bryon. Informing her refill was sent to [facility].     Received message from Navigator concerning Oxycodone 15 mg not being ready. Calling [facility] and going over patient dx, with list of past pain medication given while at [facility]. Pharmacist then tells nurse that Oxycodone 15 mg not available. Once nurse talked with Dr. Ellis oxycodone 10 mg every 6 hours has been sent in. Patient's  up dated at this time. Zofran also has been sent in for patient.

## 2024-10-07 ENCOUNTER — DOCUMENTATION (OUTPATIENT)
Dept: NUTRITION | Facility: HOSPITAL | Age: 74
End: 2024-10-07
Payer: MEDICARE

## 2024-10-07 ENCOUNTER — HOSPITAL ENCOUNTER (OUTPATIENT)
Dept: GENERAL RADIOLOGY | Facility: HOSPITAL | Age: 74
Discharge: HOME | End: 2024-10-07

## 2024-10-07 DIAGNOSIS — C25.0 MALIGNANT NEOPLASM OF HEAD OF PANCREAS: ICD-10-CM

## 2024-10-07 PROCEDURE — 71045 X-RAY EXAM CHEST 1 VIEW: CPT

## 2024-10-07 NOTE — PROGRESS NOTES
"Outpatient Oncology Nutrition     Reason for Visit: Oncology Nutrition Screening and Referral received from Payton Farley RN, GI Nurse Navigator     Patient Name:  Claudia Flores    :  1950    MRN:  [id number]    Date of Encounter: 10/07/2024    Nutrition Assessment     Diagnosis: Stage IV adenocarcinoma of the pancreas     Chemotherapy: OP PANCREATIC PACLitaxel Protein-Bound / Gemcitabine - days 1,15 - every 28 days (start date 10/10/24)    Patient Active Problem List:    Patient Active Problem List   Diagnosis    Malignant tumor of head of pancreas       Food / Nutrition Related History       Hydration Status     Goal: 56 - 64 ounces     Enteral Feeding       Anthropometric Measurements     Height:    Ht Readings from Last 1 Encounters:   10/03/24 157.5 cm (62\")       Weight:    Wt Readings from Last 1 Encounters:   10/03/24 52.6 kg (116 lb)       BMI: 21.2 - Normal    Weight Change: ~10# (7.9%) weight loss x ~4 months     Review of Lab Data (Time Frame - 1 month / 2 month)   No labs available     Medication Review   MAR reviewed     Nutrition Focused Physical Findings       Nutrition Impact Symptoms   [facility]     Physical Activity   Normal with no limitations    Current Nutritional Intake     Oral diet:  Regular     Malnutrition Risk Assessment     Recent weight loss over the past 6 months:  Yes    How much weight loss:  1 = 2-13 lbs    Eating poorly because of a decreased appetite:  1 = Yes    Malnutrition Screening Score:     MST = 2 more Patient at risk for malnutrition     Nutrition Diagnosis     Problem Malnutrition in the context of chronic illness    Etiology Newly diagnosed metastatic pancreatic cancer    Signs / Symptoms ~10# (7.9%) weight loss and insufficient energy intake      Nutrition Intervention   Referral received, patient's chart reviewed, and initial nutritional screening completed as above.  Patient scheduled to begin chemotherapy on Thursday " 10/10/24, initial consultation will take place at that visit.    Goal       Monitoring / Evaluation   Will consult with patient as above, RD available to assist prn.     Cynthia Weldon MS, RD, LD

## 2024-10-08 ENCOUNTER — HOSPITAL ENCOUNTER (OUTPATIENT)
Dept: ONCOLOGY | Facility: HOSPITAL | Age: 74
Discharge: HOME | End: 2024-10-08
Payer: MEDICARE

## 2024-10-08 ENCOUNTER — OFFICE VISIT (OUTPATIENT)
Dept: ONCOLOGY | Facility: CLINIC | Age: 74
End: 2024-10-08
Payer: MEDICARE

## 2024-10-08 ENCOUNTER — SPECIALTY PHARMACY (OUTPATIENT)
Dept: ONCOLOGY | Facility: HOSPITAL | Age: 74
End: 2024-10-08
Payer: MEDICARE

## 2024-10-08 VITALS
OXYGEN SATURATION: 100 % | SYSTOLIC BLOOD PRESSURE: 120 MMHG | TEMPERATURE: 97.3 F | WEIGHT: 117 LBS | RESPIRATION RATE: 16 BRPM | HEART RATE: 79 BPM | DIASTOLIC BLOOD PRESSURE: 59 MMHG | BODY MASS INDEX: 21.53 KG/M2 | HEIGHT: 62 IN

## 2024-10-08 DIAGNOSIS — C25.0 MALIGNANT TUMOR OF HEAD OF PANCREAS: ICD-10-CM

## 2024-10-08 DIAGNOSIS — C25.0 MALIGNANT TUMOR OF HEAD OF PANCREAS: Primary | ICD-10-CM

## 2024-10-08 LAB
ALBUMIN SERPL-MCNC: 3.9 G/DL (ref 3.5–5.2)
ALBUMIN/GLOB SERPL: 1.3 G/DL
ALP SERPL-CCNC: 303 U/L (ref 39–117)
ALT SERPL W P-5'-P-CCNC: 57 U/L (ref 1–33)
ANION GAP SERPL CALCULATED.3IONS-SCNC: 7 MMOL/L (ref 5–15)
AST SERPL-CCNC: 56 U/L (ref 1–32)
BASOPHILS # BLD AUTO: 0.04 10*3/MM3 (ref 0–0.2)
BASOPHILS NFR BLD AUTO: 0.5 % (ref 0–1.5)
BILIRUB SERPL-MCNC: 0.3 MG/DL (ref 0–1.2)
BUN SERPL-MCNC: 8 MG/DL (ref 8–23)
BUN/CREAT SERPL: 15.1 (ref 7–25)
CALCIUM SPEC-SCNC: 9.2 MG/DL (ref 8.6–10.5)
CANCER AG19-9 SERPL-ACNC: ABNORMAL U/ML
CHLORIDE SERPL-SCNC: 96 MMOL/L (ref 98–107)
CO2 SERPL-SCNC: 26 MMOL/L (ref 22–29)
CREAT SERPL-MCNC: 0.53 MG/DL (ref 0.57–1)
DEPRECATED RDW RBC AUTO: 45.8 FL (ref 37–54)
EGFRCR SERPLBLD CKD-EPI 2021: 96.6 ML/MIN/1.73
EOSINOPHIL # BLD AUTO: 0.07 10*3/MM3 (ref 0–0.4)
EOSINOPHIL NFR BLD AUTO: 0.8 % (ref 0.3–6.2)
ERYTHROCYTE [DISTWIDTH] IN BLOOD BY AUTOMATED COUNT: 12.3 % (ref 12.3–15.4)
GLOBULIN UR ELPH-MCNC: 2.9 GM/DL
GLUCOSE SERPL-MCNC: 100 MG/DL (ref 65–99)
HCT VFR BLD AUTO: 39.1 % (ref 34–46.6)
HGB BLD-MCNC: 12.7 G/DL (ref 12–15.9)
IMM GRANULOCYTES # BLD AUTO: 0.01 10*3/MM3 (ref 0–0.05)
IMM GRANULOCYTES NFR BLD AUTO: 0.1 % (ref 0–0.5)
LYMPHOCYTES # BLD AUTO: 1.11 10*3/MM3 (ref 0.7–3.1)
LYMPHOCYTES NFR BLD AUTO: 12.9 % (ref 19.6–45.3)
MCH RBC QN AUTO: 32.6 PG (ref 26.6–33)
MCHC RBC AUTO-ENTMCNC: 32.5 G/DL (ref 31.5–35.7)
MCV RBC AUTO: 100.3 FL (ref 79–97)
MONOCYTES # BLD AUTO: 0.76 10*3/MM3 (ref 0.1–0.9)
MONOCYTES NFR BLD AUTO: 8.8 % (ref 5–12)
NEUTROPHILS NFR BLD AUTO: 6.6 10*3/MM3 (ref 1.7–7)
NEUTROPHILS NFR BLD AUTO: 76.9 % (ref 42.7–76)
PLATELET # BLD AUTO: 159 10*3/MM3 (ref 140–450)
PMV BLD AUTO: 9.7 FL (ref 6–12)
POTASSIUM SERPL-SCNC: 4.7 MMOL/L (ref 3.5–5.2)
PROT SERPL-MCNC: 6.8 G/DL (ref 6–8.5)
RBC # BLD AUTO: 3.9 10*6/MM3 (ref 3.77–5.28)
SODIUM SERPL-SCNC: 129 MMOL/L (ref 136–145)
WBC NRBC COR # BLD AUTO: 8.59 10*3/MM3 (ref 3.4–10.8)

## 2024-10-08 PROCEDURE — 85025 COMPLETE CBC W/AUTO DIFF WBC: CPT | Performed by: INTERNAL MEDICINE

## 2024-10-08 PROCEDURE — 86301 IMMUNOASSAY TUMOR CA 19-9: CPT | Performed by: INTERNAL MEDICINE

## 2024-10-08 PROCEDURE — 80053 COMPREHEN METABOLIC PANEL: CPT | Performed by: INTERNAL MEDICINE

## 2024-10-08 PROCEDURE — 36415 COLL VENOUS BLD VENIPUNCTURE: CPT

## 2024-10-08 RX ORDER — LIDOCAINE/PRILOCAINE 2.5 %-2.5%
1 CREAM (GRAM) TOPICAL AS NEEDED
Qty: 30 G | Refills: 2 | Status: SHIPPED | OUTPATIENT
Start: 2024-10-08

## 2024-10-08 RX ORDER — LIDOCAINE/PRILOCAINE 2.5 %-2.5%
1 CREAM (GRAM) TOPICAL AS NEEDED
Qty: 30 G | Refills: 2 | Status: SHIPPED | OUTPATIENT
Start: 2024-10-08 | End: 2024-10-08 | Stop reason: SDUPTHER

## 2024-10-08 RX ORDER — ONDANSETRON 8 MG/1
8 TABLET, FILM COATED ORAL 3 TIMES DAILY PRN
Qty: 30 TABLET | Refills: 5 | Status: SHIPPED | OUTPATIENT
Start: 2024-10-08

## 2024-10-08 RX ORDER — ONDANSETRON 8 MG/1
8 TABLET, FILM COATED ORAL 3 TIMES DAILY PRN
Qty: 30 TABLET | Refills: 5 | Status: SHIPPED | OUTPATIENT
Start: 2024-10-08 | End: 2024-10-08 | Stop reason: SDUPTHER

## 2024-10-08 NOTE — PROGRESS NOTES
Outpatient Infusion  [address] [place] [address]  [phone]      CHEMOTHERAPY EDUCATION    NAME:  Claudia Flores      : 1950           DATE: 10/08/24    Medication Education Sheets: (select all that apply)  Gemcitabine and Albumin-Bound Paclitaxel    Other Education Sheets: (select all that apply)  CINV, Diarrhea, and Symptom Tracker Sheet and RUY Information    Chemotherapy Regimen:   OP PANCREATIC PACLitaxel Protein-Bound / Gemcitabine every 28 days  IV Abraxane and IV Gemzar on days 1 and 15 of each 28-day cycle    TOPICS EDUCATION PROVIDED COMMENTS   ANEMIA:  role of RBC, cause, s/s, ways to manage, role of transfusion [x] Reviewed the role of RBC and the use of transfusions if hemoglobin decreases too much.  Patient to notify us if she experiences shortness of breath, dizziness, or palpitations.  Also let patient know she could feel more tired than usual and to try to stay active, but rest if she needs to.    THROMBOCYTOPENIA:  role of platelet, cause, s/s, ways to prevent bleeding, things to avoid, when to seek help [x] Reviewed the role of platelets in blood clotting and when to call clinic (bloody nose that bleeds for 5 minutes despite pressure, a cut that won't stop bleeding despite pressure, gums that bleed excessively with brushing or flossing). Recommend using a soft bristle toothbrush and blowing the nose gently.   NEUTROPENIA:  role of WBC, cause, infection precautions, s/s of infection, when to call MD [x] Reviewed the role of WBC, good infection prevention practices, and when to call the clinic (temperature 100.4F, sore throat, burning urination, etc).   DIARRHEA:  causes, s/s of dehydration, ways to manage, dietary changes, when to call MD [x] Chemotherapy : Discussed the risk of diarrhea. Instructed patient on use OTC loperamide with diarrhea onset, but emphasized the importance of calling the clinic if 4-6 episodes in 24 hours not relieved by OTC  loperamide.   NAUSEA & VOMITING:  cause, use of antiemetics, dietary changes, when to call MD [x] Emetic risk: Low  Premeds: Dexamethasone  PRN home meds: Ondansetron 8mg PO TID PRN N/V  Pharmacy home meds sent to: [facility]    Instructed the patient to take a dose of the PRN medication at the first onset of nausea and if it's not working to call us for additional medications.  Also provided non-drug measures to mitigate nausea.   ALOPECIA:  cause, ways to manage, resources [x] Discussed the possibility of hair loss with the patient. Informed patient that she could request a prescription for a wig if desired and most of the cost is usually covered by insurance. Recommended covering the head with a hat and/or protecting the skin on the head with SPF 30 or higher.    NERVOUS SYSTEM CHANGES:  causes, s/s, neuropathies, cognitive changes, ways to manage [x] Peripheral Neuropathy - Discussed the adverse effect of peripheral neuropathy and signs/symptoms associated with this adverse effect. Instructed patient to call if symptoms become more frequent or worsen.    PAIN:  causes, ways to manage [x] Chemo: Discussed muscle and joint aches/pains with chemotherapy, and recommended the use of OTC pain relief with ibuprofen or acetaminophen if needed. and EMLA Cream: Discussed rx for EMLA cream, and the benefit of use 45-60 minutes prior to access of port for lab draws / infusions. Rx sent to [facility] in [facility].   SKIN & NAIL CHANGES:  cause, s/s, ways to manage [x] Generalized Rash: Discussed the potential for a rash or itchy skin, offered nonpharmacologic prevention strategies, and instructed the patient to call if a rash develops and worsens.   ORGAN TOXICITIES:  cause, s/s, need for diagnostic tests, labs, when to notify MD [x] Discussed potential effects on organ systems, monitoring, diagnostic tests, labs, and when to notify the clinic. Discussed the signs/symptoms of the following:  cardiotoxicity, eye changes (  blurry vision, watery eyes, photophobia), hepatotoxicity, and lung changes. Also reviewed the risk of hemolytic uremic syndrome.   INFUSION RELATED REACTIONS or INJECTION-SITE REACTION:  Cause, s/s, anaphylaxis, monitoring, etc. [x] Discussed the risk of an infusion or allergic reaction and symptoms such as: fever, chills, dizziness, itchiness or rash, flushing, trouble breathing, wheezing, sudden back pain, or feeling faint.  Instructed the patient to notify her nurse if she starts feeling weird at any point during her infusion.    Reviewed how infusion reactions are managed.   MISCELLANEOUS:  drug interactions, administration, labs, etc. [x] Discussed chemotherapy schedule, lab draws, infusion times, and total expected visit time.   DDIs: No significant DDIs  Lab draws: On or before days 1 and 15 of each cycle, no sooner than 3 days early.  Fluid Retention: Explained the signs/symptoms of fluid retention around ankles, feet, and possibly in the hands.  Reviewed strategies to minimize this and recommended that the patient call the clinic if he/she gains 5 or more pounds in 1 week or experiences shortness of breath without exertion.     INFERTILITY & SEXUALITY:    causes, fertility preservation options, sexuality changes, ways to manage, importance of birth control [x] IV Oncology Therapy: Reviewed safe sex practices and the importance of minimizing exposure to body fluids for 48 hours after each dose of IV oncology therapy. The patient is not of childbearing potential.   HOME CARE:  storing of oral chemo, how to manage bodily fluids [x] IV - Counseled on management of soiled linens and proper flush technique.  Discussed how to manage all the side effects at home and advised when to contact the MD office.     Medications:  Prior to Admission medications    Medication Sig Start Date End Date Taking? Authorizing Provider   Acetaminophen (TYLENOL EXTRA STRENGTH PO) Take 3 tablets by mouth  3 times a day.    Ana Mondragon MD   amLODIPine-benazepril (LOTREL 5-10) 5-10 MG per capsule Take 1 capsule by mouth Daily. 3/31/22   Ana Mondragon MD   levothyroxine (SYNTHROID, LEVOTHROID) 50 MCG tablet Take 1 tablet by mouth.    Ana Mondragon MD   ondansetron ODT (ZOFRAN-ODT) 8 MG disintegrating tablet Place 1 tablet on the tongue Every 8 (Eight) Hours As Needed for Nausea or Vomiting. 10/4/24   Ni Jackson MD   oxyCODONE (ROXICODONE) 10 MG tablet Take 1 tablet by mouth Every 6 (Six) Hours As Needed for Moderate Pain. 10/4/24   iN Jackson MD   rosuvastatin (CRESTOR) 5 MG tablet Take 1 tablet by mouth Daily.    Ana Mondragon MD   traMADol (ULTRAM) 50 MG tablet Take 1 tablet by mouth 6 (Six) Times a Day. 9/28/24   Ana Mondragon MD   oxyCODONE (ROXICODONE) 15 MG immediate release tablet Take 1 tablet by mouth Every 6 (Six) Hours As Needed for Moderate Pain. 10/3/24 10/4/24  Ni Jackson MD   oxyCODONE (ROXICODONE) 15 MG immediate release tablet Take 1 tablet by mouth Every 6 (Six) Hours As Needed for Moderate Pain. 10/4/24 10/4/24  Ni Jackson MD     Notes: All questions and concerns were addressed. Provided a personalized treatment calendar to patient (includes treatment and lab schedule). Provided patient with contact information for the pharmacist and clinic while instructing her to call if any questions or concerns arise. Informed consent for treatment was obtained. Patient and her  were both receptive to information and expressed understanding.     Ann Cowden Mayer, PharmD, [facility]  Oncology Clinical Pharmacist  Phone [phone]    10/8/2024  13:03 EDT

## 2024-10-08 NOTE — PROGRESS NOTES
"CHEMOTHERAPY PREPARATION    Claudia Flores  [id number]  1950    Subjective   Chief Complaint: Treatment Preparation and Needs Assessment    History of present illness:  Claudia Flores is a 73 y.o. year old female who is here today for chemotherapy preparation and needs assessment. The patient has been diagnosed with pancreatic cancer with metastasis to liver and is scheduled to begin treatment with Gemzar / Abraxane.     Oncology History:    Oncology/Hematology History   Malignant tumor of head of pancreas   10/2/2024 Cancer Staged    Staging form: Exocrine Pancreas, AJCC 8th Edition  - Clinical stage from 10/2/2024: Stage IV (cT2, cN0, cM1) - Signed by Ni Jackson MD on 10/4/2024     10/3/2024 Initial Diagnosis    Malignant tumor of head of pancreas     10/10/2024 -  Chemotherapy    OP PANCREATIC PACLitaxel Protein-Bound / Gemcitabine         The current medication list and allergy list were reviewed and reconciled.     Past Medical History, Past Surgical History, Social History, Family History have been reviewed and are without significant changes except as mentioned.    Review of Systems   Constitutional:  Positive for activity change, appetite change and fatigue. Negative for chills and fever.   HENT: Negative.     Eyes: Negative.    Respiratory: Negative.     Cardiovascular: Negative.    Gastrointestinal:  Positive for abdominal pain.   Endocrine: Negative.    Genitourinary: Negative.    Musculoskeletal: Negative.    Skin: Negative.    Allergic/Immunologic: Negative.    Neurological: Negative.    Hematological: Negative.    Psychiatric/Behavioral:  The patient is nervous/anxious.        Objective   Physical Exam  Vital Signs: /59 Comment: RUE  Pulse 79   Temp 97.3 °F (36.3 °C) (Infrared)   Resp 16   Ht 157.5 cm (62\")   Wt 53.1 kg (117 lb)   SpO2 100% Comment: RA  BMI 21.40 kg/m²   Vitals:    10/08/24 1425   PainSc:   1   PainLoc: Abdomen           General Appearance:  alert, " cooperative, no apparent distress and appears stated age   Neurologic/Psych: A&O x 3, gait steady, appropriate affect   HEENT:  Normocephalic, without obvious abnormality, mucous membranes moist   Lungs:   Clear to auscultation bilaterally; respirations regular, even, and unlabored bilaterally   Heart:  Regular rate and rhythm, no murmurs appreciated   Extremities: Normal, atraumatic; no clubbing, cyanosis, or edema    Skin: No rashes, lesions, or abnormal coloration noted     ECOG Performance Status: 1 - Symptomatic but completely ambulatory            NEEDS ASSESSMENTS    Genetics  The patient's new diagnosis and family history have been reviewed for genetic counseling needs. A genetic referral is recommended.     Molecular Testing  Further molecular testing on tumor is not indicated. Nex Gen sequencing has already been sent.     Psychosocial  The patient has completed a PHQ-9 Depression Screening and the Distress Thermometer (DT) today.   PHQ-9 Total Score: 9 . PHQ-9 results show 5-9 (Mild Depression). The patient scored their distress today as 2 on a scale of 0-10 with 0 being no distress and 10 being extreme distress.   Problems marked by the patient as being an issue for them within the last week include practical problems, emotional problems, spiritual/Spiritism concerns, and physical problems.   Results were reviewed along with psychosocial resources offered by our cancer center. Our oncology social worker will be flagged for a DT score of 4 or above, and a same day call will be made for a score of 9 or 10. A mental health referral is recommended at this time. The patient is accepting of a referral to [facility]. Referral to [facility] has already been placed. Waiting on appointment.   Copies of patient's questionnaires will be scanned into EMR for details and further reference.    Barriers to care  A barriers form was also completed by the patient today. We discussed services offered by our  "facility to help her have adequate access to care. The patient was given the name and card for our Oncology Social Worker. Based upon barriers assessment today, the patient will not require a follow-up call from the  to further discuss needs.   A copy of the barriers form will also be scanned into EMR for details and further reference.     VAD Assessment  The patient and I discussed planned intervenous chemotherapy as well as other IV treatments that are often needed throughout the course of treatment. These may include, but are not limited to blood transfusions, antibiotics, and IV hydration. The vasculature does not appear to be adequate for multiple peripheral IVs throughout their treatment course. Discussed risks and benefits of VADs. The patient would like to pursue Port-A-Cath insertion prior to initiation of treatment. Port a cath was inserted 10/7/2024. Prescription for EMLA cream sent to pharmacy today.     Advance Care Planning   ACP discussion was held with the patient during this visit. Patient has an advance directive in EMR which is still valid.   The patient and I discussed advanced care planning, \"Conversations that Matter\".   This service was offered, free of charge, for development of advance directives with a certified ACP facilitator.     Palliative Care  The patient and I discussed palliative care services. Palliative care is not the same as Hospice care. This is specialized medical care for people living with serious illness with the goal of improving quality of life for the patient and their family. [facility] offers our patients outpatient palliative care early along with their treatment to assist in coordination of care, symptom management, pain management, and medical decision making.  Oncology criteria for palliative care referral is met at this time. The patient is interested in a palliative care consultation. Referral already placed for patient. Waiting on appointment to be " scheduled.     Additional Referral needs  none      CHEMOTHERAPY EDUCATION    Booklets Given: Chemotherapy and You [x]  Eating Hints [x]    Sexuality/Fertility Books []      Chemotherapy/Biotherapy Education Sheets: (list all that apply)  nausea management, acid reflux management, diarrhea management, Cancer resourse contacts information, skin and mouth care, and vaccination information                                                                                                                                                                 Chemotherapy Regimen:   Treatment Plans       Name Type Plan Dates Plan Provider         Active    OP PANCREATIC PACLitaxel Protein-Bound / Gemcitabine ONCOLOGY TREATMENT  10/9/2024 - Present Ni Jackson MD                      DETAILED CHEMOTHERAPY TEACHING COMPLETED BY PHARMACY. CHEMOTHERAPY CONSENT COMPLETED BY PHARMACY. SEE PHARMACY EDUCATION FOR DOCUMENTATION.     Chemotherapy education comprehension reviewed. Questions answered and additional information discussed on topics including:  Anemia, Thrombocytopenia, Neutropenia, Nutrition and appetite changes, Constipation, Diarrhea, Nausea & vomiting, Mouth sores, Alopecia, Nervous system changes, Pain, Skin & nail changes, and Organ toxicities        Assessment and Plan:    Diagnoses and all orders for this visit:    1. Malignant tumor of head of pancreas (Primary)  -     Discontinue: lidocaine-prilocaine (EMLA) 2.5-2.5 % cream; Apply 1 Application topically to the appropriate area as directed As Needed (45-60 minutes prior to port access.  Cover with saran/plastic wrap).  Dispense: 30 g; Refill: 2  -     Ambulatory Referral to Genetic Counseling/Testing  -     Discontinue: ondansetron (ZOFRAN) 8 MG tablet; Take 1 tablet by mouth 3 (Three) Times a Day As Needed for Nausea or Vomiting.  Dispense: 30 tablet; Refill: 5  -     lidocaine-prilocaine (EMLA) 2.5-2.5 % cream; Apply 1 Application topically to the appropriate  area as directed As Needed (45-60 minutes prior to port access.  Cover with saran/plastic wrap).  Dispense: 30 g; Refill: 2  -     ondansetron (ZOFRAN) 8 MG tablet; Take 1 tablet by mouth 3 (Three) Times a Day As Needed for Nausea or Vomiting.  Dispense: 30 tablet; Refill: 5          I spent 43 minutes caring for Claudia on this date of service. This time includes time spent by me in the following activities: preparing for the visit, obtaining and/or reviewing a separately obtained history, performing a medically appropriate examination and/or evaluation, counseling and educating the patient/family/caregiver, ordering medications, tests, or procedures, and documenting information in the medical record.     The patient and I have reviewed their new cancer diagnosis and scheduled treatment plan. Needs assessment was completed including genetics, psychosocial needs, barriers to care, VAD evaluation, advanced care planning, and palliative care services. Referrals have been ordered as appropriate based upon our evaluation and patient desires.     Chemotherapy teaching was also completed today as documented above. Adequate time was given to answer all questions to her satisfaction. Patient and family are aware of their care team members and contact information if they have questions or problems throughout the treatment course. Needs assessments and education has been completed. The patient is adequately prepared to begin treatment as scheduled.           Kathia Madrigal, APRN    10/08/24

## 2024-10-10 ENCOUNTER — HOSPITAL ENCOUNTER (OUTPATIENT)
Dept: ONCOLOGY | Facility: HOSPITAL | Age: 74
Discharge: HOME | End: 2024-10-10
Payer: MEDICARE

## 2024-10-10 ENCOUNTER — TELEPHONE (OUTPATIENT)
Dept: GENETICS | Facility: HOSPITAL | Age: 74
End: 2024-10-10
Payer: MEDICARE

## 2024-10-10 ENCOUNTER — DOCUMENTATION (OUTPATIENT)
Dept: NUTRITION | Facility: HOSPITAL | Age: 74
End: 2024-10-10
Payer: MEDICARE

## 2024-10-10 VITALS
SYSTOLIC BLOOD PRESSURE: 119 MMHG | DIASTOLIC BLOOD PRESSURE: 57 MMHG | RESPIRATION RATE: 16 BRPM | HEIGHT: 62 IN | HEART RATE: 102 BPM | TEMPERATURE: 98 F | WEIGHT: 116 LBS | BODY MASS INDEX: 21.35 KG/M2

## 2024-10-10 DIAGNOSIS — Z45.2 ENCOUNTER FOR CARE RELATED TO PORT-A-CATH: ICD-10-CM

## 2024-10-10 DIAGNOSIS — C25.0 MALIGNANT TUMOR OF HEAD OF PANCREAS: Primary | ICD-10-CM

## 2024-10-10 PROCEDURE — 25010000002 PACLITAXEL PROTEIN-BOUND PART PER 1 MG: Performed by: INTERNAL MEDICINE

## 2024-10-10 PROCEDURE — 25010000002 GEMCITABINE HCL 2 GM/20ML SOLUTION 20 ML VIAL: Performed by: INTERNAL MEDICINE

## 2024-10-10 PROCEDURE — 25810000003 SODIUM CHLORIDE 0.9 % SOLUTION: Performed by: INTERNAL MEDICINE

## 2024-10-10 PROCEDURE — 25810000003 SODIUM CHLORIDE 0.9 % SOLUTION 250 ML FLEX CONT: Performed by: INTERNAL MEDICINE

## 2024-10-10 PROCEDURE — 96413 CHEMO IV INFUSION 1 HR: CPT

## 2024-10-10 PROCEDURE — 96417 CHEMO IV INFUS EACH ADDL SEQ: CPT

## 2024-10-10 PROCEDURE — 96375 TX/PRO/DX INJ NEW DRUG ADDON: CPT

## 2024-10-10 PROCEDURE — 25010000002 DEXAMETHASONE SODIUM PHOSPHATE 100 MG/10ML SOLUTION: Performed by: INTERNAL MEDICINE

## 2024-10-10 PROCEDURE — 25010000002 HEPARIN LOCK FLUSH PER 10 UNITS: Performed by: INTERNAL MEDICINE

## 2024-10-10 RX ORDER — HEPARIN SODIUM (PORCINE) LOCK FLUSH IV SOLN 100 UNIT/ML 100 UNIT/ML
500 SOLUTION INTRAVENOUS AS NEEDED
Status: DISCONTINUED | OUTPATIENT
Start: 2024-10-10 | End: 2024-10-11 | Stop reason: HOSPADM

## 2024-10-10 RX ORDER — HEPARIN SODIUM (PORCINE) LOCK FLUSH IV SOLN 100 UNIT/ML 100 UNIT/ML
500 SOLUTION INTRAVENOUS AS NEEDED
OUTPATIENT
Start: 2024-10-10

## 2024-10-10 RX ORDER — PACLITAXEL 100 MG/20ML
200 INJECTION, POWDER, LYOPHILIZED, FOR SUSPENSION INTRAVENOUS ONCE
Status: COMPLETED | OUTPATIENT
Start: 2024-10-10 | End: 2024-10-10

## 2024-10-10 RX ORDER — SODIUM CHLORIDE 9 MG/ML
20 INJECTION, SOLUTION INTRAVENOUS ONCE
Status: COMPLETED | OUTPATIENT
Start: 2024-10-10 | End: 2024-10-10

## 2024-10-10 RX ORDER — SODIUM CHLORIDE 0.9 % (FLUSH) 0.9 %
10 SYRINGE (ML) INJECTION AS NEEDED
Status: DISCONTINUED | OUTPATIENT
Start: 2024-10-10 | End: 2024-10-11 | Stop reason: HOSPADM

## 2024-10-10 RX ORDER — SODIUM CHLORIDE 0.9 % (FLUSH) 0.9 %
20 SYRINGE (ML) INJECTION AS NEEDED
OUTPATIENT
Start: 2024-10-10

## 2024-10-10 RX ORDER — SODIUM CHLORIDE 0.9 % (FLUSH) 0.9 %
10 SYRINGE (ML) INJECTION AS NEEDED
OUTPATIENT
Start: 2024-10-10

## 2024-10-10 RX ORDER — SODIUM CHLORIDE 0.9 % (FLUSH) 0.9 %
20 SYRINGE (ML) INJECTION AS NEEDED
Status: DISCONTINUED | OUTPATIENT
Start: 2024-10-10 | End: 2024-10-11 | Stop reason: HOSPADM

## 2024-10-10 RX ADMIN — Medication 20 ML: at 11:51

## 2024-10-10 RX ADMIN — DEXAMETHASONE SODIUM PHOSPHATE 12 MG: 10 INJECTION, SOLUTION INTRAMUSCULAR; INTRAVENOUS at 09:38

## 2024-10-10 RX ADMIN — GEMCITABINE 1500 MG: 100 INJECTION, SOLUTION INTRAVENOUS at 11:11

## 2024-10-10 RX ADMIN — HEPARIN 500 UNITS: 100 SYRINGE at 11:51

## 2024-10-10 RX ADMIN — SODIUM CHLORIDE 20 ML/HR: 9 INJECTION, SOLUTION INTRAVENOUS at 09:34

## 2024-10-10 RX ADMIN — PACLITAXEL 200 MG: 100 INJECTION, POWDER, LYOPHILIZED, FOR SUSPENSION INTRAVENOUS at 10:15

## 2024-10-10 NOTE — PROGRESS NOTES
ONC Nutrition    Diagnosis: Stage IV adenocarcinoma of the pancreas      Chemotherapy: OP PANCREATIC PACLitaxel Protein-Bound / Gemcitabine - days 1,15 - every 28 days (start date 10/10/24)     Weight:  116 lbs  Weight Change: ~10# (7.9%) weight loss x ~4 months     Nutrition Diagnosis      Problem Malnutrition in the context of chronic illness    Etiology Newly diagnosed metastatic pancreatic cancer    Signs / Symptoms ~10# (7.9%) weight loss and insufficient energy intake        Initial consultation with patient and her  during C#1 infusion.  Patient states that appetite & taste changes, along with abdominal discomfort began prior to diagnosis.  She does experience some occasional gas and gastric reflux.  She states that bowel movements have been normal, but has been constipated this week related to starting Oxycodone for pain.    Reviewed current pattern of eating including food choices, meal and snack frequency which indicates inadequate intake to meet nutritional needs to prevent additional weight loss.    Discussed the functions of the pancreas as it relates to insulin secretion and blood sugar management and production of digestive enzymes to aid in digestion of carbohydrate, protein and fat.    Discussed the importance of nutrition with diagnosis and treatment plan, focusing on adequate calorie, protein, nutrient and hydration. Encouraged focus on healthy food choices that would support immune system and nutritional status.   Discussed indication for higher protein intake and reviewed high protein foods with encouragement to include at each meal.  Protein goal 65-80 grams per day.  Reviewed gas forming foods that may need to be limited or avoided. Encouraged small, frequent meals throughout the day to support adequate intake for weight stablization.    Discussed the possible side effects of treatment and tips for nutritional management including N/V, constipation, diarrhea, fatigue, appetite and taste  changes.   Reviewed modification of the diet for managing treatment side effects. Encouraged adequate hydration with goal of 60 ounces+ per day.    Stressed importance of good oral hygiene in managing taste changes with encouragement to use a soft toothbrush, brush prior to meals and avoid alcohol based mouth rinses. Provided recipe for baking soda, salt and water mouth rinse with encouragement to use several times per day.    Discussed ONS and the benefit of including these types of products when oral intake is insufficient to meet nutritional needs; tips for flavor and nutrient enhancement discussed. Also encouraged homemade shakes, smoothies, instant breakfast drinks, etc to boost intake.  Samples of Ensure Complete were provided, along with coupons.      Will continue to follow.

## 2024-10-12 ENCOUNTER — TELEPHONE (OUTPATIENT)
Dept: ONCOLOGY | Facility: CLINIC | Age: 74
End: 2024-10-12
Payer: MEDICARE

## 2024-10-12 NOTE — TELEPHONE ENCOUNTER
Pt called with complaints of temp to 99 4 days after cycle 1 of chemotherapy. She is tired with no other localizing symptoms. Counseled her to avoid tylenol and ibuprofen and continue to monitor her temperature. She should be evaluated in the ER if her temperature reaches 100.5. We discussed the risk of neutropenic fever and rationale for evaluation.  She expressed understanding.

## 2024-10-14 ENCOUNTER — CLINICAL SUPPORT (OUTPATIENT)
Dept: GENETICS | Facility: HOSPITAL | Age: 74
End: 2024-10-14
Payer: MEDICARE

## 2024-10-14 DIAGNOSIS — Z80.3 FAMILY HISTORY OF BREAST CANCER: ICD-10-CM

## 2024-10-14 DIAGNOSIS — Z13.79 GENETIC TESTING: Primary | ICD-10-CM

## 2024-10-14 DIAGNOSIS — Z80.41 FAMILY HISTORY OF OVARIAN CANCER: ICD-10-CM

## 2024-10-14 DIAGNOSIS — C25.0 MALIGNANT TUMOR OF HEAD OF PANCREAS: ICD-10-CM

## 2024-10-14 NOTE — PROGRESS NOTES
Claudia Flores, a 73-year-old female, was seen for genetic counseling due to a personal history of pancreatic cancer. Ms. Flores recently diagnosed with pancreatic cancer at age 73. She is undergoing chemotherapy. She also has a history of basal cell skin cancer. Ms. Flores was 11 years old at menarche and had her first child at age 29. She is post-menopausal and retains her uterus and ovaries. Her most recent mammogram was earlier this month and showed heterogeneously dense tissue but was otherwise normal. Her was most recently colonoscopy was in 2023, and she reports a history of less than five polyps. She was interested in discussing her risk for a hereditary cancer syndrome. Ms. Flores was interested in pursuing a multi-gene panel to evaluate her risk of cancer, therefore the CancerNext Expanded panel was ordered through [employer] which analyzes BRCA1/2 and 69 additional genes associated with an increased cancer risk. Results are expected 2-3 weeks once the sample has been received.     PERTINENT FAMILY HISTORY: (See attached pedigree)   Sister:   Breast cancer, 50  Mat Grandmother: Ovarian cancer, 60    Medical records regarding the family history were not available for review.      RISK ASSESSMENT:  Ms. Flores's personal history of pancreatic cancer led to concern for a hereditary cancer syndrome. We discussed BRCA1/2 testing as well as the option of pursuing a panel that would test for other genes known to impact cancer risk in addition to BRCA1/2. Ms. Flores clearly meets NCCN guidelines criteria for BRCA1/2 testing based on her personal history of pancreatic cancer. These risk assessments are based on the family history information provided at the time of the appointment and could change in the future should new information be obtained.    GENETIC COUNSELING (30 minutes):  We reviewed the family history information in detail. Cases of cancer follow three general patterns: sporadic, familial, and  hereditary. While most cancer is sporadic, some cases appear to occur in family clusters. These cases are said to be familial and account for 10-20% of cancer cases. Familial cases may be due to a combination of shared genes and environmental factors among family members. In even fewer families, the cancer is said to be inherited, and the genes responsible for the cancer are known.      Family histories typical of hereditary cancer syndromes usually include multiple first- and second-degree relatives diagnosed with cancer types that define a syndrome. These cases tend to be diagnosed at younger-than-expected ages and can be bilateral or multifocal. The cancer in these families follows an autosomal dominant inheritance pattern, which indicates the likely presence of a mutation in a cancer susceptibility gene. Children and siblings of an individual believed to carry this mutation have a 50% chance of inheriting that mutation, thereby inheriting the increased risk to develop cancer. These mutations can be passed down from the maternal or the paternal lineage.    Hereditary pancreatic cancer accounts for approximately 10% of all cases of pancreatic cancer. The gene most often associated with a hereditary risk for pancreatic cancer is BRCA2. Mutations in BRCA2 confer up to a 7% risk for pancreatic cancer. Mutations in BRCA1 and BRCA2 also confer an increased risk for breast cancer, ovarian cancer, male breast cancer, and prostate cancer. Women with a BRCA1 or BRCA2 mutation have over a 60% risk of breast cancer and up to a 58% risk of ovarian cancer. Alfred syndrome is a hereditary colon cancer syndrome that is associated with pancreatic cancer. The CDKN2A gene is associated with increased risks for pancreatic cancer and melanoma. We reviewed that, in some cases, the identification of a genetic mutation may impact treatment options for some types of cancer. In order to get as much information as possible regarding  potential underlying causes, as well as risks for her family, Ms. Flores opted to pursue testing through a panel that would look at multiple genes associated with hereditary risk for cancer.    GENETIC TESTING:  The risks, benefits and limitations of genetic testing and implications for clinical management following testing were reviewed. DNA test results can influence decisions regarding screening, prevention and surgical management. Genetic testing can have significant psychological implications for both individuals and families. Also discussed was the possibility of employment and insurance discrimination based on genetic test results and the laws in place to prevent this ([place]).    We discussed panel testing, which would involve testing for BRCA1/2 as well as 69 additional genes that are associated with increased cancer risk. The benefits and limitations of genetic testing were discussed, and Ms. Flores decided to pursue testing via the panel. The implications of a positive or negative test result were discussed. We discussed the possibility that, in some cases, genetic test results may be informative or may be ambiguous due to the identification of a genetic variant. These variants may or may not be associated with an increased cancer risk. With multigene panel testing, it is not uncommon for a variant of uncertain significance (VUS) to be identified. If a VUS is identified, testing family members is typically not recommended and screening recommendations are made based on the family history. The laboratories that perform genetic testing work to reclassify the VUS and send out an amended report if and when a VUS is reclassified. The majority of variant findings are ultimately reclassified to a negative result. Given her personal and family history, a negative test result would not eliminate all cancer risk to her relatives, although the risk would not be as high as it would with positive genetic testing.       PLAN: Genetic testing via the CancerNext Expanded panel through [employer] was ordered. She plans to have her blood drawn on 10/22 at [facility]. Results are expected 2-3 weeks once the sample has been received. We will contact Ms. Flores to discuss results once they are received. She is welcome to contact us in the meantime at [phone] with any questions she may have.      Charleen Lechuga MS, [facility] [place], [place], [facility]  Licensed Certified Genetic Counselor

## 2024-10-18 ENCOUNTER — TELEPHONE (OUTPATIENT)
Dept: ONCOLOGY | Facility: CLINIC | Age: 74
End: 2024-10-18
Payer: MEDICARE

## 2024-10-18 NOTE — TELEPHONE ENCOUNTER
Caller: MarkClaudia MATT     Relationship: SELF    Best call back number: [phone]    What is your medical concern? CLAUDIA HAD HER FIRST INFUSION ON 10/10/24 & AS OF 10/16/24 SHE HAS HAD NOSE BLEEDS EVERY MORNING - NOT GUSHING - IS THIS NORMAL?    How long has this issue been going on? 3 DAYS    Is your provider already aware of this issue? NO    Have you been treated for this issue? NO

## 2024-10-18 NOTE — TELEPHONE ENCOUNTER
Called patient after discussing with Dr. Ellis informing patient that Dr. Ellis wants her to use [place] Jelly on the inside of her nose to help proctect the nasal lining informing her that Dr. Ellis stated we will check her labs on Tuesday when she comes in. Patient stating she understood.

## 2024-10-22 ENCOUNTER — HOSPITAL ENCOUNTER (OUTPATIENT)
Dept: ONCOLOGY | Facility: HOSPITAL | Age: 74
Discharge: HOME | End: 2024-10-22
Admitting: INTERNAL MEDICINE
Payer: MEDICARE

## 2024-10-22 VITALS
HEART RATE: 81 BPM | RESPIRATION RATE: 18 BRPM | TEMPERATURE: 97.7 F | WEIGHT: 118 LBS | DIASTOLIC BLOOD PRESSURE: 60 MMHG | SYSTOLIC BLOOD PRESSURE: 130 MMHG | BODY MASS INDEX: 21.71 KG/M2 | HEIGHT: 62 IN

## 2024-10-22 DIAGNOSIS — Z45.2 ENCOUNTER FOR CARE RELATED TO PORT-A-CATH: Primary | ICD-10-CM

## 2024-10-22 DIAGNOSIS — C25.0 MALIGNANT TUMOR OF HEAD OF PANCREAS: ICD-10-CM

## 2024-10-22 LAB
ALBUMIN SERPL-MCNC: 3.5 G/DL (ref 3.5–5.2)
ALBUMIN/GLOB SERPL: 1.3 G/DL
ALP SERPL-CCNC: 395 U/L (ref 39–117)
ALT SERPL W P-5'-P-CCNC: 31 U/L (ref 1–33)
ANION GAP SERPL CALCULATED.3IONS-SCNC: 10 MMOL/L (ref 5–15)
AST SERPL-CCNC: 45 U/L (ref 1–32)
BASOPHILS # BLD AUTO: 0.06 10*3/MM3 (ref 0–0.2)
BASOPHILS NFR BLD AUTO: 1.3 % (ref 0–1.5)
BILIRUB SERPL-MCNC: 0.4 MG/DL (ref 0–1.2)
BUN SERPL-MCNC: 11 MG/DL (ref 8–23)
BUN/CREAT SERPL: 19.6 (ref 7–25)
CALCIUM SPEC-SCNC: 8.7 MG/DL (ref 8.6–10.5)
CHLORIDE SERPL-SCNC: 96 MMOL/L (ref 98–107)
CO2 SERPL-SCNC: 24 MMOL/L (ref 22–29)
CREAT SERPL-MCNC: 0.56 MG/DL (ref 0.57–1)
DEPRECATED RDW RBC AUTO: 44.6 FL (ref 37–54)
EGFRCR SERPLBLD CKD-EPI 2021: 95.3 ML/MIN/1.73
EOSINOPHIL # BLD AUTO: 0.15 10*3/MM3 (ref 0–0.4)
EOSINOPHIL NFR BLD AUTO: 3.3 % (ref 0.3–6.2)
ERYTHROCYTE [DISTWIDTH] IN BLOOD BY AUTOMATED COUNT: 12.2 % (ref 12.3–15.4)
GLOBULIN UR ELPH-MCNC: 2.7 GM/DL
GLUCOSE SERPL-MCNC: 134 MG/DL (ref 65–99)
HCT VFR BLD AUTO: 34.2 % (ref 34–46.6)
HGB BLD-MCNC: 11.5 G/DL (ref 12–15.9)
IMM GRANULOCYTES # BLD AUTO: 0.02 10*3/MM3 (ref 0–0.05)
IMM GRANULOCYTES NFR BLD AUTO: 0.4 % (ref 0–0.5)
LYMPHOCYTES # BLD AUTO: 0.8 10*3/MM3 (ref 0.7–3.1)
LYMPHOCYTES NFR BLD AUTO: 17.9 % (ref 19.6–45.3)
MCH RBC QN AUTO: 33.3 PG (ref 26.6–33)
MCHC RBC AUTO-ENTMCNC: 33.6 G/DL (ref 31.5–35.7)
MCV RBC AUTO: 99.1 FL (ref 79–97)
MONOCYTES # BLD AUTO: 0.67 10*3/MM3 (ref 0.1–0.9)
MONOCYTES NFR BLD AUTO: 15 % (ref 5–12)
NEUTROPHILS NFR BLD AUTO: 2.78 10*3/MM3 (ref 1.7–7)
NEUTROPHILS NFR BLD AUTO: 62.1 % (ref 42.7–76)
PLATELET # BLD AUTO: 239 10*3/MM3 (ref 140–450)
PMV BLD AUTO: 9.9 FL (ref 6–12)
POTASSIUM SERPL-SCNC: 4.3 MMOL/L (ref 3.5–5.2)
PROT SERPL-MCNC: 6.2 G/DL (ref 6–8.5)
RBC # BLD AUTO: 3.45 10*6/MM3 (ref 3.77–5.28)
SODIUM SERPL-SCNC: 130 MMOL/L (ref 136–145)
WBC NRBC COR # BLD AUTO: 4.48 10*3/MM3 (ref 3.4–10.8)

## 2024-10-22 PROCEDURE — 36591 DRAW BLOOD OFF VENOUS DEVICE: CPT

## 2024-10-22 PROCEDURE — 25010000002 HEPARIN LOCK FLUSH PER 10 UNITS: Performed by: INTERNAL MEDICINE

## 2024-10-22 PROCEDURE — 80053 COMPREHEN METABOLIC PANEL: CPT | Performed by: INTERNAL MEDICINE

## 2024-10-22 PROCEDURE — 85025 COMPLETE CBC W/AUTO DIFF WBC: CPT | Performed by: INTERNAL MEDICINE

## 2024-10-22 RX ORDER — SODIUM CHLORIDE 0.9 % (FLUSH) 0.9 %
10 SYRINGE (ML) INJECTION AS NEEDED
Status: CANCELLED | OUTPATIENT
Start: 2024-10-22

## 2024-10-22 RX ORDER — HEPARIN SODIUM (PORCINE) LOCK FLUSH IV SOLN 100 UNIT/ML 100 UNIT/ML
500 SOLUTION INTRAVENOUS AS NEEDED
Status: DISCONTINUED | OUTPATIENT
Start: 2024-10-22 | End: 2024-10-23 | Stop reason: HOSPADM

## 2024-10-22 RX ORDER — SODIUM CHLORIDE 0.9 % (FLUSH) 0.9 %
10 SYRINGE (ML) INJECTION AS NEEDED
Status: DISCONTINUED | OUTPATIENT
Start: 2024-10-22 | End: 2024-10-23 | Stop reason: HOSPADM

## 2024-10-22 RX ORDER — HEPARIN SODIUM (PORCINE) LOCK FLUSH IV SOLN 100 UNIT/ML 100 UNIT/ML
500 SOLUTION INTRAVENOUS AS NEEDED
Status: CANCELLED | OUTPATIENT
Start: 2024-10-22

## 2024-10-22 RX ORDER — SODIUM CHLORIDE 0.9 % (FLUSH) 0.9 %
20 SYRINGE (ML) INJECTION AS NEEDED
Status: CANCELLED | OUTPATIENT
Start: 2024-10-22

## 2024-10-22 RX ADMIN — Medication 10 ML: at 15:10

## 2024-10-22 RX ADMIN — HEPARIN 500 UNITS: 100 SYRINGE at 15:10

## 2024-10-24 ENCOUNTER — HOSPITAL ENCOUNTER (OUTPATIENT)
Dept: ONCOLOGY | Facility: HOSPITAL | Age: 74
Discharge: HOME | End: 2024-10-24
Admitting: INTERNAL MEDICINE
Payer: MEDICARE

## 2024-10-24 ENCOUNTER — OFFICE VISIT (OUTPATIENT)
Dept: ONCOLOGY | Facility: CLINIC | Age: 74
End: 2024-10-24
Payer: MEDICARE

## 2024-10-24 ENCOUNTER — DOCUMENTATION (OUTPATIENT)
Dept: NUTRITION | Facility: HOSPITAL | Age: 74
End: 2024-10-24
Payer: MEDICARE

## 2024-10-24 ENCOUNTER — PATIENT OUTREACH (OUTPATIENT)
Dept: OTHER | Facility: HOSPITAL | Age: 74
End: 2024-10-24
Payer: MEDICARE

## 2024-10-24 VITALS
HEART RATE: 97 BPM | BODY MASS INDEX: 21.35 KG/M2 | TEMPERATURE: 97.1 F | WEIGHT: 116 LBS | RESPIRATION RATE: 16 BRPM | HEIGHT: 62 IN | OXYGEN SATURATION: 95 % | DIASTOLIC BLOOD PRESSURE: 60 MMHG | SYSTOLIC BLOOD PRESSURE: 132 MMHG

## 2024-10-24 DIAGNOSIS — C25.0 MALIGNANT TUMOR OF HEAD OF PANCREAS: Primary | ICD-10-CM

## 2024-10-24 DIAGNOSIS — Z45.2 ENCOUNTER FOR CARE RELATED TO PORT-A-CATH: ICD-10-CM

## 2024-10-24 PROCEDURE — 25010000002 HEPARIN LOCK FLUSH PER 10 UNITS: Performed by: INTERNAL MEDICINE

## 2024-10-24 PROCEDURE — 96415 CHEMO IV INFUSION ADDL HR: CPT

## 2024-10-24 PROCEDURE — 96375 TX/PRO/DX INJ NEW DRUG ADDON: CPT

## 2024-10-24 PROCEDURE — 25010000002 DEXAMETHASONE SODIUM PHOSPHATE 100 MG/10ML SOLUTION: Performed by: INTERNAL MEDICINE

## 2024-10-24 PROCEDURE — 25010000002 GEMCITABINE HCL 2 GM/20ML SOLUTION 20 ML VIAL: Performed by: INTERNAL MEDICINE

## 2024-10-24 PROCEDURE — 99214 OFFICE O/P EST MOD 30 MIN: CPT | Performed by: INTERNAL MEDICINE

## 2024-10-24 PROCEDURE — 1126F AMNT PAIN NOTED NONE PRSNT: CPT | Performed by: INTERNAL MEDICINE

## 2024-10-24 PROCEDURE — 96413 CHEMO IV INFUSION 1 HR: CPT

## 2024-10-24 PROCEDURE — 25010000002 PACLITAXEL PROTEIN-BOUND PART PER 1 MG: Performed by: INTERNAL MEDICINE

## 2024-10-24 PROCEDURE — 25810000003 SODIUM CHLORIDE 0.9 % SOLUTION 250 ML FLEX CONT: Performed by: INTERNAL MEDICINE

## 2024-10-24 RX ORDER — PACLITAXEL 100 MG/20ML
125 INJECTION, POWDER, LYOPHILIZED, FOR SUSPENSION INTRAVENOUS ONCE
Status: COMPLETED | OUTPATIENT
Start: 2024-10-24 | End: 2024-10-24

## 2024-10-24 RX ORDER — SODIUM CHLORIDE 0.9 % (FLUSH) 0.9 %
20 SYRINGE (ML) INJECTION AS NEEDED
OUTPATIENT
Start: 2024-10-24

## 2024-10-24 RX ORDER — SODIUM CHLORIDE 9 MG/ML
20 INJECTION, SOLUTION INTRAVENOUS ONCE
OUTPATIENT
Start: 2024-12-19

## 2024-10-24 RX ORDER — SODIUM CHLORIDE 0.9 % (FLUSH) 0.9 %
10 SYRINGE (ML) INJECTION AS NEEDED
OUTPATIENT
Start: 2024-10-24

## 2024-10-24 RX ORDER — HEPARIN SODIUM (PORCINE) LOCK FLUSH IV SOLN 100 UNIT/ML 100 UNIT/ML
500 SOLUTION INTRAVENOUS AS NEEDED
OUTPATIENT
Start: 2024-10-24

## 2024-10-24 RX ORDER — PACLITAXEL 100 MG/20ML
125 INJECTION, POWDER, LYOPHILIZED, FOR SUSPENSION INTRAVENOUS ONCE
OUTPATIENT
Start: 2024-12-05

## 2024-10-24 RX ORDER — SODIUM CHLORIDE 9 MG/ML
20 INJECTION, SOLUTION INTRAVENOUS ONCE
OUTPATIENT
Start: 2024-12-05

## 2024-10-24 RX ORDER — PACLITAXEL 100 MG/20ML
125 INJECTION, POWDER, LYOPHILIZED, FOR SUSPENSION INTRAVENOUS ONCE
OUTPATIENT
Start: 2024-12-19

## 2024-10-24 RX ORDER — HEPARIN SODIUM (PORCINE) LOCK FLUSH IV SOLN 100 UNIT/ML 100 UNIT/ML
500 SOLUTION INTRAVENOUS AS NEEDED
Status: DISCONTINUED | OUTPATIENT
Start: 2024-10-24 | End: 2024-10-25 | Stop reason: HOSPADM

## 2024-10-24 RX ADMIN — HEPARIN 500 UNITS: 100 SYRINGE at 12:03

## 2024-10-24 RX ADMIN — DEXAMETHASONE SODIUM PHOSPHATE 12 MG: 10 INJECTION, SOLUTION INTRAMUSCULAR; INTRAVENOUS at 09:42

## 2024-10-24 RX ADMIN — GEMCITABINE 1500 MG: 100 INJECTION, SOLUTION INTRAVENOUS at 11:27

## 2024-10-24 RX ADMIN — PACLITAXEL 190 MG: 100 INJECTION, POWDER, LYOPHILIZED, FOR SUSPENSION INTRAVENOUS at 10:29

## 2024-10-24 NOTE — PROGRESS NOTES
PROBLEM LIST:  Oncology/Hematology History   Malignant tumor of head of pancreas   10/2/2024 Cancer Staged    Staging form: Exocrine Pancreas, AJCC 8th Edition  - Clinical stage from 10/2/2024: Stage IV (cT2, cN0, cM1) - Signed by Ni Jackson MD on 10/4/2024     10/3/2024 Initial Diagnosis    Malignant tumor of head of pancreas     10/10/2024 -  Chemotherapy    OP PANCREATIC PACLitaxel Protein-Bound / Gemcitabine         REASON FOR VISIT: Pancreatic cancer    HISTORY OF PRESENT ILLNESS:   73 y.o.  female presents today for follow-up of her pancreatic cancer.  She is undergoing chemotherapy with Abraxane and gemcitabine.  She received 1 dose.  Seems tolerated it reasonably well.  She was having considerable constipation.  Her weight has somewhat stabilized.  Having trouble with heartburn.    Past medical history, social history and family history was reviewed 10/24/24 and unchanged from prior visit.    Review of Systems:    Review of Systems - Oncology         Medications:        Current Outpatient Medications:     Acetaminophen (TYLENOL EXTRA STRENGTH PO), Take 3 tablets by mouth 3 times a day., Disp: , Rfl:     amLODIPine-benazepril (LOTREL 5-10) 5-10 MG per capsule, Take 1 capsule by mouth Daily., Disp: , Rfl:     levothyroxine (SYNTHROID, LEVOTHROID) 50 MCG tablet, Take 1 tablet by mouth., Disp: , Rfl:     lidocaine-prilocaine (EMLA) 2.5-2.5 % cream, Apply 1 Application topically to the appropriate area as directed As Needed (45-60 minutes prior to port access.  Cover with saran/plastic wrap)., Disp: 30 g, Rfl: 2    ondansetron (ZOFRAN) 8 MG tablet, Take 1 tablet by mouth 3 (Three) Times a Day As Needed for Nausea or Vomiting., Disp: 30 tablet, Rfl: 5    ondansetron ODT (ZOFRAN-ODT) 8 MG disintegrating tablet, Place 1 tablet on the tongue Every 8 (Eight) Hours As Needed for Nausea or Vomiting., Disp: 30 tablet, Rfl: 5    oxyCODONE (ROXICODONE) 10 MG tablet, Take 1 tablet by mouth Every 6 (Six) Hours As  "Needed for Moderate Pain., Disp: 120 tablet, Rfl: 0    rosuvastatin (CRESTOR) 5 MG tablet, Take 1 tablet by mouth Daily., Disp: , Rfl:     Pain Medications               Acetaminophen (TYLENOL EXTRA STRENGTH PO) Take 3 tablets by mouth 3 times a day.    oxyCODONE (ROXICODONE) 10 MG tablet Take 1 tablet by mouth Every 6 (Six) Hours As Needed for Moderate Pain.               ALLERGIES:  No Known Allergies      Physical Exam    VITAL SIGNS:  /60 Comment: LUE  Pulse 97   Temp 97.1 °F (36.2 °C) (Infrared)   Resp 16   Ht 157.5 cm (62\")   Wt 52.6 kg (116 lb)   SpO2 95% Comment: RA  BMI 21.22 kg/m²     ECOG score: 0           Wt Readings from Last 3 Encounters:   10/24/24 52.6 kg (116 lb)   10/22/24 53.5 kg (118 lb)   10/10/24 52.6 kg (116 lb)       Body mass index is 21.22 kg/m². Body surface area is 1.52 meters squared.       Performance Status: 1    General: well appearing, in no acute distress  HEENT: sclera anicteric, neck is supple  Lymphatics: no cervical, supraclavicular, or axillary adenopathy  Cardiovascular: regular rate and rhythm, no murmurs, rubs or gallops  Lungs: clear to auscultation bilaterally  Abdomen: soft, nontender, nondistended.  No palpable organomegaly  Extremities: no lower extremity edema  Skin: no rashes, lesions, bruising, or petechiae  Msk:  Shows no weakness of the large muscle groups  Psych: Mood is stable        RECENT LABS:    Lab Results   Component Value Date    HGB 11.5 (L) 10/22/2024    HCT 34.2 10/22/2024    MCV 99.1 (H) 10/22/2024     10/22/2024    WBC 4.48 10/22/2024    NEUTROABS 2.78 10/22/2024    LYMPHSABS 0.80 10/22/2024    MONOSABS 0.67 10/22/2024    EOSABS 0.15 10/22/2024    BASOSABS 0.06 10/22/2024       Lab Results   Component Value Date    GLUCOSE 134 (H) 10/22/2024    BUN 11 10/22/2024    CREATININE 0.56 (L) 10/22/2024     (L) 10/22/2024    K 4.3 10/22/2024    CL 96 (L) 10/22/2024    CO2 24.0 10/22/2024    CALCIUM 8.7 10/22/2024    PROTEINTOT 6.2 " 10/22/2024    ALBUMIN 3.5 10/22/2024    BILITOT 0.4 10/22/2024    ALKPHOS 395 (H) 10/22/2024    AST 45 (H) 10/22/2024    ALT 31 10/22/2024     Lab Results   Component Value Date     36,750.0 (H) 10/08/2024       XR Chest 1 View    Result Date: 10/7/2024  Impression: No acute cardiopulmonary findings. Electronically Signed: Merrill Duncan MD  10/7/2024 10:43 AM EDT  Workstation ID: [id number]    Mammo Screening Digital Tomosynthesis Bilateral With CAD    Result Date: 10/1/2024  FINAL IMPRESSION: ACR BI-RADS 2: Benign findings. RECOMMENDATIONS: Routine annual screening mammography. A letter including results and recommendations was sent to the patient. Density notification was provided to all patients. Patient information entered into a reminder system with a target due date for the next mammogram. At our facility, a [place] marker is positioned over a visible skin lesion and a linear marker is used to indicate a scar. A triangular marker is placed on a self reported palpable finding. Note: Mammography does not detect approximately 10-15% of breast cancers. An annual clinical breast exam by the patient's breast care physician and regular monthly self breast exams by the patient are integral parts of breast cancer screening, in addition to annual mammography. A normal mammogram does not completely exclude the presence of breast cancer, especially if there is an abnormal finding on physical exam. When clinically indicated, a biopsy should not be deferred because of a normal mammogram report. cc:     MRI Abdomen With & Without Contrast    Result Date: 9/25/2024  Impression: Pancreatic head mass measuring around 3 cm, likely adenocarcinoma. There is resultant pancreaticobiliary ductal obstruction as well as portosplenomesenteric venous occlusion. Innumerable hepatic metastases. Suspected omental nodularity and mesenteric lymphadenopathy, which may represent disease involvement. Right lower lobe pulmonary nodules.  Electronically Signed: Merrill Duncan MD  9/25/2024 11:59 PM EDT  Workstation ID: [id number]    CT Abdomen Pelvis Without Contrast    Result Date: 9/24/2024  Impression: 1.There appears to be irregular enlargement of the pancreatic head concerning for pancreatic neoplasm with mild pancreatic duct dilation. MRI abdomen with and without contrast recommended. 2.Nonspecific central mesenteric edema with few anterior omental vague soft tissue density nodules which may represent lymph nodes. Omental metastatic implants not excluded 3.Slightly larger right lower lobe pulmonary nodules. Metastatic disease is a consideration. Electronically Signed: Kalen Murray MD  9/24/2024 3:39 PM EDT  Workstation ID: [id number]    CT Chest Low Dose Cancer Screening WO    Result Date: 8/14/2024  Impression: 1. There are several new subcentimeter subpleural noncalcified nodules in the right lower lobe and one in the right upper lobe measuring up to 5 mm. These are favored to be infectious or inflammatory in etiology but close follow-up is recommended. These are below the resolution for PET. A follow-up chest CT in 6 months is recommended. 2.Mild emphysema with stable biapical pleural thickening and evidence of prior granulomatous disease are unchanged. 3. Recommendation: 6 month follow up with LDCT Lung Rads Assessment: Lung-RADS L3 - Probably benign, 1-2% chance of malignancy. Electronically Signed: Puneet Hagen DO  8/14/2024 10:16 PM EDT  Workstation ID: [id number]          Assessment/Plan    1.  Stage IV adenocarcinoma of the pancreas.  Did NexGen sequencing on the blood with no obvious targetable mutation.  For now we will continue Abraxane and gemcitabine every other week.  Seems to be tolerating it reasonably well without significant issues with side effects.  Likely reimage her in early December.    2.  Heartburn secondary to chemotherapy.  Will try PPI and baking soda with water.  If this does not help her she is likely having bile  reflux and will need some form of acidic source to neutralize it.    3.  Constipation.  Significant issues with constipation gave her a good bowel regimen including MiraLAX twice a day along with stool softener        Total time of patient care on day of service including time prior to, face to face with patient, and following visit spent in reviewing records, lab results, imaging studies, discussion with patient, and documentation/charting was > 33 minutes.     Ni Jackson MD  [facility] Hematology and Oncology    Return on: 11/07/24  Return in (Approximately): 2 months, Schedule with next infusion    Orders Placed This Encounter   Procedures    Cancer Antigen 19-9    Comprehensive metabolic panel    Comprehensive metabolic panel    CBC and Differential    CBC and Differential       10/24/2024     Future Appointments         Provider Department Center    10/24/2024 10:00 AM CHAIR 6 [facility] OUTPATIENT ONCOLOGY TC    11/4/2024 10:00 AM Iris Lott APRN [facility] BEHAVIORAL HEALTH TC    11/5/2024 3:30 PM ACCESS AND LAB DRAW CHAIR 1 [facility] OUTPATIENT ONCOLOGY TC    11/7/2024 10:15 AM (Arrive by 10:00 AM) Ni Jackson MD [facility] HEMATOLOGY & ONCOLOGY TC    11/7/2024 11:00 AM CHAIR 18 [facility] OUTPATIENT ONCOLOGY TC    11/11/2024 11:00 AM Michelle Arroyo PA-C [facility] PALLIATIVE CARE TC

## 2024-10-24 NOTE — PROGRESS NOTES
ONC Nutrition     Diagnosis: Stage IV adenocarcinoma of the pancreas      Chemotherapy: OP PANCREATIC PACLitaxel Protein-Bound / Gemcitabine - days 1,15 - every 28 days (start date 10/10/24)     Weight:  116 lbs / weight stable since last treatment  Weight Change: ~10# (7.9%) weight loss x ~4 months      Nutrition Diagnosis      Problem Malnutrition in the context of chronic illness    Etiology Newly diagnosed metastatic pancreatic cancer    Signs / Symptoms ~10# (7.9%) weight loss and insufficient energy intake       Follow up with patient as she received D#15 C#1 chemo.  Patient stated that she tolerated first chemo extremely well, with little to no noticeable side effects.    She states that she really has been trying to make changes in her diet to choose healthier foods, eat more often and eat a wide variety of choices.  She continues to experience gastric reflux and plans to start taking Nexium daily.  She is careful about avoiding foods that cause reflux and is trying to be more conscious about sitting up after eating.    She is also continuing to experience taste changes.  Reviewed tips for management of taste changes including brushing prior to the meal and using the baking soda, salt and water mouth rinses.  Reviewed suggestions for taste enhancements.  She continues with an aversion to beef and pork.    Will continue to follow.

## 2024-11-04 ENCOUNTER — TELEMEDICINE (OUTPATIENT)
Dept: PSYCHIATRY | Facility: CLINIC | Age: 74
End: 2024-11-04
Payer: MEDICARE

## 2024-11-04 DIAGNOSIS — F43.0 STRESS REACTION, ACUTE: Primary | ICD-10-CM

## 2024-11-04 PROCEDURE — 90792 PSYCH DIAG EVAL W/MED SRVCS: CPT | Performed by: NURSE PRACTITIONER

## 2024-11-04 PROCEDURE — 1159F MED LIST DOCD IN RCRD: CPT | Performed by: NURSE PRACTITIONER

## 2024-11-04 PROCEDURE — 1160F RVW MEDS BY RX/DR IN RCRD: CPT | Performed by: NURSE PRACTITIONER

## 2024-11-04 NOTE — PROGRESS NOTES
"        Subjective   Claudia Flores is a  retired 74 y.o. female who is here today for initial appointment consenting to telemedicine session. Patient was referred by: Medical Oncology because she has stage IV pancreatic cancer. Dr Ni Jackson ([place]) is her oncologist and treating her with chemotherapy. She will be having her third round this week via her port. She lives in [facility] with her  Malick. They have a daughter and two grandchildren.       PATIENT AT: THEIR PLACE OF RESIDENCE    PROVIDER AT:  [facility]   [facility]  [address], [address]  [place] [address]    Chief Complaint:  adjustment to diagnosis and treatment and side effects from treatment        History of Present Illness  Patient reports it was such a shock to be diagnosed with stage IV pancreatic cancer and very difficult on her  and daughter. She states it is emotional to see them cry and also other family members. She reports tolerating treatment fairly well. It is every two weeks. She has good communication through the [employer] portal and feels confident in her care. She reports it was difficult when her hair started really falling out in handfuls after the second chemo session and had to have a friend shave her head. \"Now I wear a turban and everyday put make up on so I look better for my \". She had lost 12 lbs prior to treatment and trying to gain it back, weighing currently 116 lbs. She denies being overly stressed but there are episodic moments with blood work, scans, looking different, pain, constipation, bloody nose. Tries to take a walk with  around a court across the street twice daily. Has pain managed currently well with Oxycodone three times a day and another when she wakes up in middle of night to use restroom, states \"that really made a difference, I wake up feeling better\". Reports [employer] Sunday school class supportive, 3 sisters, " "neighbors,  and daughter all caring. 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 She denies depression, crying, panic, overly anxious at this time while I feel relatively good. Denies PTSD, OCD or daren ever. Denies SI/HI or AVH. Denies alcohol, or illicit drug use. Does     The following portions of the patient's history were reviewed and updated as appropriate: allergies, current medications, past family history, past medical history, past social history, past surgical history, and problem list.    Evaluate the six pillars of health: Nutrition, Activity, Sleep, Social Engagement, Stress Management, decreasing toxins ie nicotine, alcohol, illicit drugs     Past Psych History:denies     LETICIA REVIEWED: no red flags      Family Psychiatric History:  family history includes Breast cancer (age of onset: 50) in her sister; Heart disease in her father; Hypertension in her mother; Ovarian cancer (age of onset: 60) in her maternal grandmother; Parkinsonism in her father.      Social History:   and have one adult daughter who has two daughters 10 yo and 14yo and states \"they are my biggest light in my life\". Has good relationship with her three sisters , one in [place], one in [place], and one in [place]. Her mother is 93 yo and lives in [place], [place]. One of her sisters took over for pt watching over mother. She reports they got their hernadez and  planned. 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 She and her  belong to [facility] and has strength in her wali and support from  School Class. She reports supportive neighbors and other friends. \"I have great support from all of them\".       Medical/Surgical History:  Past Medical History:   Diagnosis Date    Cancer     skin     Disease of thyroid gland     Hyperlipidemia     Hypertension     Pancreatic mass      Past Surgical History:   Procedure Laterality Date    APPENDECTOMY  2000    TONSILLECTOMY "  1974    in college    TOTAL HIP ARTHROPLASTY Left 03/2024    VENOUS ACCESS DEVICE (PORT) INSERTION Right 10/07/2024       No Known Allergies    Current Medications:   Current Outpatient Medications   Medication Sig Dispense Refill    amLODIPine-benazepril (LOTREL 5-10) 5-10 MG per capsule Take 1 capsule by mouth Daily.      levothyroxine (SYNTHROID, LEVOTHROID) 50 MCG tablet Take 1 tablet by mouth.      lidocaine-prilocaine (EMLA) 2.5-2.5 % cream Apply 1 Application topically to the appropriate area as directed As Needed (45-60 minutes prior to port access.  Cover with saran/plastic wrap). 30 g 2    ondansetron (ZOFRAN) 8 MG tablet Take 1 tablet by mouth 3 (Three) Times a Day As Needed for Nausea or Vomiting. 30 tablet 5    ondansetron ODT (ZOFRAN-ODT) 8 MG disintegrating tablet Place 1 tablet on the tongue Every 8 (Eight) Hours As Needed for Nausea or Vomiting. 30 tablet 5    oxyCODONE (ROXICODONE) 10 MG tablet Take 1 tablet by mouth Every 6 (Six) Hours As Needed for Moderate Pain. 120 tablet 0    rosuvastatin (CRESTOR) 5 MG tablet Take 1 tablet by mouth Daily.       No current facility-administered medications for this visit.       Lab Results:  Hospital Outpatient Visit on 10/22/2024   Component Date Value Ref Range Status    Glucose 10/22/2024 134 (H)  65 - 99 mg/dL Final    BUN 10/22/2024 11  8 - 23 mg/dL Final    Creatinine 10/22/2024 0.56 (L)  0.57 - 1.00 mg/dL Final    Sodium 10/22/2024 130 (L)  136 - 145 mmol/L Final    Potassium 10/22/2024 4.3  3.5 - 5.2 mmol/L Final    Chloride 10/22/2024 96 (L)  98 - 107 mmol/L Final    CO2 10/22/2024 24.0  22.0 - 29.0 mmol/L Final    Calcium 10/22/2024 8.7  8.6 - 10.5 mg/dL Final    Total Protein 10/22/2024 6.2  6.0 - 8.5 g/dL Final    Albumin 10/22/2024 3.5  3.5 - 5.2 g/dL Final    ALT (SGPT) 10/22/2024 31  1 - 33 U/L Final    AST (SGOT) 10/22/2024 45 (H)  1 - 32 U/L Final    Alkaline Phosphatase 10/22/2024 395 (H)  39 - 117 U/L Final    Total Bilirubin 10/22/2024 0.4   0.0 - 1.2 mg/dL Final    Globulin 10/22/2024 2.7  gm/dL Final    Calculated Result    A/G Ratio 10/22/2024 1.3  g/dL Final    BUN/Creatinine Ratio 10/22/2024 19.6  7.0 - 25.0 Final    Anion Gap 10/22/2024 10.0  5.0 - 15.0 mmol/L Final    eGFR 10/22/2024 95.3  >60.0 mL/min/1.73 Final    WBC 10/22/2024 4.48  3.40 - 10.80 10*3/mm3 Final    RBC 10/22/2024 3.45 (L)  3.77 - 5.28 10*6/mm3 Final    Hemoglobin 10/22/2024 11.5 (L)  12.0 - 15.9 g/dL Final    Hematocrit 10/22/2024 34.2  34.0 - 46.6 % Final    MCV 10/22/2024 99.1 (H)  79.0 - 97.0 fL Final    MCH 10/22/2024 33.3 (H)  26.6 - 33.0 pg Final    MCHC 10/22/2024 33.6  31.5 - 35.7 g/dL Final    RDW 10/22/2024 12.2 (L)  12.3 - 15.4 % Final    RDW-SD 10/22/2024 44.6  37.0 - 54.0 fl Final    MPV 10/22/2024 9.9  6.0 - 12.0 fL Final    Platelets 10/22/2024 239  140 - 450 10*3/mm3 Final    Neutrophil % 10/22/2024 62.1  42.7 - 76.0 % Final    Lymphocyte % 10/22/2024 17.9 (L)  19.6 - 45.3 % Final    Monocyte % 10/22/2024 15.0 (H)  5.0 - 12.0 % Final    Eosinophil % 10/22/2024 3.3  0.3 - 6.2 % Final    Basophil % 10/22/2024 1.3  0.0 - 1.5 % Final    Immature Grans % 10/22/2024 0.4  0.0 - 0.5 % Final    Neutrophils, Absolute 10/22/2024 2.78  1.70 - 7.00 10*3/mm3 Final    Lymphocytes, Absolute 10/22/2024 0.80  0.70 - 3.10 10*3/mm3 Final    Monocytes, Absolute 10/22/2024 0.67  0.10 - 0.90 10*3/mm3 Final    Eosinophils, Absolute 10/22/2024 0.15  0.00 - 0.40 10*3/mm3 Final    Basophils, Absolute 10/22/2024 0.06  0.00 - 0.20 10*3/mm3 Final    Immature Grans, Absolute 10/22/2024 0.02  0.00 - 0.05 10*3/mm3 Final   Hospital Outpatient Visit on 10/08/2024   Component Date Value Ref Range Status    Glucose 10/08/2024 100 (H)  65 - 99 mg/dL Final    BUN 10/08/2024 8  8 - 23 mg/dL Final    Creatinine 10/08/2024 0.53 (L)  0.57 - 1.00 mg/dL Final    Sodium 10/08/2024 129 (L)  136 - 145 mmol/L Final    Potassium 10/08/2024 4.7  3.5 - 5.2 mmol/L Final    Chloride 10/08/2024 96 (L)  98 - 107  mmol/L Final    CO2 10/08/2024 26.0  22.0 - 29.0 mmol/L Final    Calcium 10/08/2024 9.2  8.6 - 10.5 mg/dL Final    Total Protein 10/08/2024 6.8  6.0 - 8.5 g/dL Final    Albumin 10/08/2024 3.9  3.5 - 5.2 g/dL Final    ALT (SGPT) 10/08/2024 57 (H)  1 - 33 U/L Final    AST (SGOT) 10/08/2024 56 (H)  1 - 32 U/L Final    Alkaline Phosphatase 10/08/2024 303 (H)  39 - 117 U/L Final    Total Bilirubin 10/08/2024 0.3  0.0 - 1.2 mg/dL Final    Globulin 10/08/2024 2.9  gm/dL Final    Calculated Result    A/G Ratio 10/08/2024 1.3  g/dL Final    BUN/Creatinine Ratio 10/08/2024 15.1  7.0 - 25.0 Final    Anion Gap 10/08/2024 7.0  5.0 - 15.0 mmol/L Final    eGFR 10/08/2024 96.6  >60.0 mL/min/1.73 Final    WBC 10/08/2024 8.59  3.40 - 10.80 10*3/mm3 Final    RBC 10/08/2024 3.90  3.77 - 5.28 10*6/mm3 Final    Hemoglobin 10/08/2024 12.7  12.0 - 15.9 g/dL Final    Hematocrit 10/08/2024 39.1  34.0 - 46.6 % Final    MCV 10/08/2024 100.3 (H)  79.0 - 97.0 fL Final    MCH 10/08/2024 32.6  26.6 - 33.0 pg Final    MCHC 10/08/2024 32.5  31.5 - 35.7 g/dL Final    RDW 10/08/2024 12.3  12.3 - 15.4 % Final    RDW-SD 10/08/2024 45.8  37.0 - 54.0 fl Final    MPV 10/08/2024 9.7  6.0 - 12.0 fL Final    Platelets 10/08/2024 159  140 - 450 10*3/mm3 Final    Neutrophil % 10/08/2024 76.9 (H)  42.7 - 76.0 % Final    Lymphocyte % 10/08/2024 12.9 (L)  19.6 - 45.3 % Final    Monocyte % 10/08/2024 8.8  5.0 - 12.0 % Final    Eosinophil % 10/08/2024 0.8  0.3 - 6.2 % Final    Basophil % 10/08/2024 0.5  0.0 - 1.5 % Final    Immature Grans % 10/08/2024 0.1  0.0 - 0.5 % Final    Neutrophils, Absolute 10/08/2024 6.60  1.70 - 7.00 10*3/mm3 Final    Lymphocytes, Absolute 10/08/2024 1.11  0.70 - 3.10 10*3/mm3 Final    Monocytes, Absolute 10/08/2024 0.76  0.10 - 0.90 10*3/mm3 Final    Eosinophils, Absolute 10/08/2024 0.07  0.00 - 0.40 10*3/mm3 Final    Basophils, Absolute 10/08/2024 0.04  0.00 - 0.20 10*3/mm3 Final    Immature Grans, Absolute 10/08/2024 0.01  0.00 - 0.05  10*3/mm3 Final    CA 19-9 10/08/2024 36,750.0 (H)  <=35.0 U/mL Final         Review of Systems Constitutional: Negative for appetite change, chills, diaphoresis, fatigue, fever and unexpected weight change.   HENT: Negative for hearing loss, sore throat, trouble swallowing and voice change.    Eyes: Negative for photophobia and visual disturbance.   Respiratory: Negative for cough, chest tightness and shortness of breath.    Cardiovascular: Negative for chest pain and palpitations.   Gastrointestinal: Negative for abdominal pain, constipation, nausea and vomiting.   Endocrine: Negative for cold intolerance and heat intolerance.   Genitourinary: Negative for dysuria and frequency.   Musculoskeletal: Negative for arthralgia, back pain, joint swelling and neck stiffness.   Skin: Negative for color change and wound.   Allergic/Immunologic: Negative for environmental allergies and immunocompromised state.   Neurological: Negative for dizziness, tremors, seizures, syncope, weakness, light-headedness and headaches.   Hematological: Negative for adenopathy. Does not bruise/bleed easily.    Objective   Physical Exam  There were no vitals taken for this visit.        PHQ-9:      10/8/2024     2:25 PM   PHQ-2/PHQ-9 Depression Screening   Retired Little Interest or Pleasure in Doing Things 1-->several days   Retired Feeling Down, Depressed or Hopeless 1-->several days   Retired Trouble Falling or Staying Asleep, or Sleeping Too Much 1-->several days   Retired Feeling Tired or Having Little Energy 2-->more than half the days   Retired Poor Appetite or Overeating 3-->nearly every day   Retired Feeling Bad about Yourself - or that You are a Failure or Have Let Yourself or Your Family Down 0-->not at all   Retired Trouble Concentrating on Things, Such as Reading the Newspaper or Watching Television 1-->several days   Retired Moving or Speaking So Slowly that Other People Could Have Noticed? Or the Opposite - Being So Fidgety  0-->not at all   Retired Thoughts that You Would be Better Off Dead or of Hurting Yourself in Some Way 0-->not at all   Retired PHQ-9: Brief Depression Severity Measure Score 9   Retired If You Checked Off Any Problems, How Difficult Have These Problems Made It For You to Do Your Work, Take Care of Things at Home, or Get Along with Other People? somewhat difficult      5-9: Minimal symptoms  10-14: Major depression mild  15-19: Major depression moderate  Greater then 20: Major depression severe      Mental Status Exam:   Appearance:  video poor, pt reports she has make up and jewelry and turban on   Hygiene:   good  Cooperation:  Cooperative  Eye Contact:   poor video  Psychomotor Behavior:  Appropriate  Mood:  within normal limits  Affect:   congruent   Hopelessness: Denies  Speech:  Normal  Thought Process:  Linear  Thought Content:  Normal  Suicidal:  None  Homicidal:  None  Hallucinations:  None  Delusion:  None  Memory:  Intact  Orientation:  Person, Place, Time, and Situation  Reliability:  good  Insight:  Good  Judgement:  Good  Impulse Control:  Good  Physical/Medical Issues:  Yes in active treatment for pancreatic cancer       Short-term goals: Patient will be compliant with clinic appointments.  Patient will be engaged in therapy, medication compliant with minimal side effects. Patient  will report decrease of symptoms and frequency.    Long-term goals: Patient will have minimal symptoms of mental health disorder with continued treatment. Patient will be compliant with treatment and appointments.       Problem list: adjustment   Strengths: patient appears motivated for treatment          Assessment & Plan   Diagnoses and all orders for this visit:    1. Stress reaction, acute (Primary)        A psychological evaluation was conducted in order to assess past and current level of functioning. Areas assessed included, but were not limited to: perception of social support, perception of ability to face and deal  with challenges in life (positive functioning), anxiety symptoms, depressive symptoms, perspective on beliefs/belief system, coping skills for stress, intelligence level,  Therapeutic rapport was established.     Assisted patient in processing above session content; acknowledged and normalized patient’s thoughts, feelings, and concerns.  Applied  positive coping skills and behavior management in session. Allowed patient to freely discuss issues without interruption or judgment. Provided safe, confidential environment to facilitate the development of positive therapeutic relationship and encourage open, honest communication.     Assisted patient in identifying risk factors which would indicate the need for higher level of care including thoughts to harm self or others and/or self-harming behavior and encouraged patient to contact this office, call 911, or present to the nearest emergency room should any of these events occur. Discussed crisis intervention services and means to access.  Patient adamantly and convincingly denies current suicidal or homicidal ideation or perceptual disturbance.    Discussed diagnosis and recommendations for treatment:    PROVIDE: Cognitive Behavioral Therapy and Solution Focused Therapy to improve functioning, maintain stability, and avoid decompensation and the need for higher level of care.    MEDICATION MANAGEMENT RECOMMENDATIONS: none at this time, is sleeping well, pain managed, denies depression or panic, high anxiety.       We discussed risks, benefits,goals and side effects of the above medication and the patient was agreeable with the plan.Patient was educated on the importance of compliance with treatment and follow-up appointments.To call for questions or concerns and return early if necessary. Crisis plan reviewed including going to the Emergency department.       Treatment Plan: stabilize mood,  patient will stay out of the hospital and be at optimal level of functioning,  take all medication as prescribed. Patient verbalized  understanding and agreement to plan.      Return if symptoms worsen or fail to improve.  Pt pleased to have this service in future for herself and available for  or daughter

## 2024-11-05 ENCOUNTER — HOSPITAL ENCOUNTER (OUTPATIENT)
Dept: ONCOLOGY | Facility: HOSPITAL | Age: 74
Discharge: HOME | End: 2024-11-05
Admitting: INTERNAL MEDICINE
Payer: MEDICARE

## 2024-11-05 VITALS
WEIGHT: 112.1 LBS | SYSTOLIC BLOOD PRESSURE: 127 MMHG | TEMPERATURE: 97.7 F | HEART RATE: 90 BPM | RESPIRATION RATE: 16 BRPM | HEIGHT: 62 IN | DIASTOLIC BLOOD PRESSURE: 61 MMHG | BODY MASS INDEX: 20.63 KG/M2

## 2024-11-05 DIAGNOSIS — Z45.2 ENCOUNTER FOR CARE RELATED TO PORT-A-CATH: ICD-10-CM

## 2024-11-05 DIAGNOSIS — C25.0 MALIGNANT TUMOR OF HEAD OF PANCREAS: Primary | ICD-10-CM

## 2024-11-05 DIAGNOSIS — C25.0 MALIGNANT TUMOR OF HEAD OF PANCREAS: ICD-10-CM

## 2024-11-05 LAB
ALBUMIN SERPL-MCNC: 3.4 G/DL (ref 3.5–5.2)
ALBUMIN/GLOB SERPL: 1.2 G/DL
ALP SERPL-CCNC: 387 U/L (ref 39–117)
ALT SERPL W P-5'-P-CCNC: 22 U/L (ref 1–33)
ANION GAP SERPL CALCULATED.3IONS-SCNC: 11 MMOL/L (ref 5–15)
AST SERPL-CCNC: 25 U/L (ref 1–32)
BASOPHILS # BLD AUTO: 0.05 10*3/MM3 (ref 0–0.2)
BASOPHILS NFR BLD AUTO: 0.7 % (ref 0–1.5)
BILIRUB SERPL-MCNC: 0.5 MG/DL (ref 0–1.2)
BUN SERPL-MCNC: 9 MG/DL (ref 8–23)
BUN/CREAT SERPL: 19.1 (ref 7–25)
CALCIUM SPEC-SCNC: 8.6 MG/DL (ref 8.6–10.5)
CHLORIDE SERPL-SCNC: 97 MMOL/L (ref 98–107)
CO2 SERPL-SCNC: 22 MMOL/L (ref 22–29)
CREAT SERPL-MCNC: 0.47 MG/DL (ref 0.57–1)
DEPRECATED RDW RBC AUTO: 44.7 FL (ref 37–54)
EGFRCR SERPLBLD CKD-EPI 2021: 98.8 ML/MIN/1.73
EOSINOPHIL # BLD AUTO: 0.27 10*3/MM3 (ref 0–0.4)
EOSINOPHIL NFR BLD AUTO: 3.8 % (ref 0.3–6.2)
ERYTHROCYTE [DISTWIDTH] IN BLOOD BY AUTOMATED COUNT: 12.4 % (ref 12.3–15.4)
GLOBULIN UR ELPH-MCNC: 2.8 GM/DL
GLUCOSE SERPL-MCNC: 99 MG/DL (ref 65–99)
HCT VFR BLD AUTO: 30.6 % (ref 34–46.6)
HGB BLD-MCNC: 10.3 G/DL (ref 12–15.9)
IMM GRANULOCYTES # BLD AUTO: 0.02 10*3/MM3 (ref 0–0.05)
IMM GRANULOCYTES NFR BLD AUTO: 0.3 % (ref 0–0.5)
LYMPHOCYTES # BLD AUTO: 1.13 10*3/MM3 (ref 0.7–3.1)
LYMPHOCYTES NFR BLD AUTO: 15.8 % (ref 19.6–45.3)
MCH RBC QN AUTO: 32.9 PG (ref 26.6–33)
MCHC RBC AUTO-ENTMCNC: 33.7 G/DL (ref 31.5–35.7)
MCV RBC AUTO: 97.8 FL (ref 79–97)
MONOCYTES # BLD AUTO: 1.38 10*3/MM3 (ref 0.1–0.9)
MONOCYTES NFR BLD AUTO: 19.3 % (ref 5–12)
NEUTROPHILS NFR BLD AUTO: 4.3 10*3/MM3 (ref 1.7–7)
NEUTROPHILS NFR BLD AUTO: 60.1 % (ref 42.7–76)
PLATELET # BLD AUTO: 201 10*3/MM3 (ref 140–450)
PMV BLD AUTO: 9.9 FL (ref 6–12)
POTASSIUM SERPL-SCNC: 3.9 MMOL/L (ref 3.5–5.2)
PROT SERPL-MCNC: 6.2 G/DL (ref 6–8.5)
RBC # BLD AUTO: 3.13 10*6/MM3 (ref 3.77–5.28)
SODIUM SERPL-SCNC: 130 MMOL/L (ref 136–145)
WBC NRBC COR # BLD AUTO: 7.15 10*3/MM3 (ref 3.4–10.8)

## 2024-11-05 PROCEDURE — 85025 COMPLETE CBC W/AUTO DIFF WBC: CPT | Performed by: INTERNAL MEDICINE

## 2024-11-05 PROCEDURE — 80053 COMPREHEN METABOLIC PANEL: CPT | Performed by: INTERNAL MEDICINE

## 2024-11-05 PROCEDURE — 25010000002 HEPARIN LOCK FLUSH PER 10 UNITS: Performed by: INTERNAL MEDICINE

## 2024-11-05 PROCEDURE — 86301 IMMUNOASSAY TUMOR CA 19-9: CPT | Performed by: INTERNAL MEDICINE

## 2024-11-05 PROCEDURE — 36591 DRAW BLOOD OFF VENOUS DEVICE: CPT

## 2024-11-05 RX ORDER — HEPARIN SODIUM (PORCINE) LOCK FLUSH IV SOLN 100 UNIT/ML 100 UNIT/ML
500 SOLUTION INTRAVENOUS AS NEEDED
Status: CANCELLED | OUTPATIENT
Start: 2024-11-05

## 2024-11-05 RX ORDER — SODIUM CHLORIDE 0.9 % (FLUSH) 0.9 %
10 SYRINGE (ML) INJECTION AS NEEDED
Status: DISCONTINUED | OUTPATIENT
Start: 2024-11-05 | End: 2024-11-06 | Stop reason: HOSPADM

## 2024-11-05 RX ORDER — HEPARIN SODIUM (PORCINE) LOCK FLUSH IV SOLN 100 UNIT/ML 100 UNIT/ML
500 SOLUTION INTRAVENOUS AS NEEDED
Status: DISCONTINUED | OUTPATIENT
Start: 2024-11-05 | End: 2024-11-06 | Stop reason: HOSPADM

## 2024-11-05 RX ORDER — SODIUM CHLORIDE 0.9 % (FLUSH) 0.9 %
10 SYRINGE (ML) INJECTION AS NEEDED
Status: CANCELLED | OUTPATIENT
Start: 2024-11-05

## 2024-11-05 RX ORDER — SODIUM CHLORIDE 0.9 % (FLUSH) 0.9 %
20 SYRINGE (ML) INJECTION AS NEEDED
Status: CANCELLED | OUTPATIENT
Start: 2024-11-05

## 2024-11-05 RX ADMIN — Medication 10 ML: at 15:22

## 2024-11-05 RX ADMIN — HEPARIN 500 UNITS: 100 SYRINGE at 15:22

## 2024-11-05 NOTE — TELEPHONE ENCOUNTER
Caller: Claudia Flores    Relationship: Self    Best call back number: [phone]     Requested Prescriptions:   Requested Prescriptions     Pending Prescriptions Disp Refills    oxyCODONE (ROXICODONE) 10 MG tablet 120 tablet 0     Sig: Take 1 tablet by mouth Every 6 (Six) Hours As Needed for Moderate Pain.        Pharmacy where request should be sent: [facility] [id number] [facility] [phone] [facility] [phone] FX     Last office visit with prescribing clinician: 10/24/2024   Last telemedicine visit with prescribing clinician: Visit date not found   Next office visit with prescribing clinician: 11/7/2024     Additional details provided by patient:     Does the patient have less than a 3 day supply:  [] Yes  [x] No    Would you like a call back once the refill request has been completed: [] Yes [x] No    If the office needs to give you a call back, can they leave a voicemail: [] Yes [x] No

## 2024-11-06 LAB — CANCER AG19-9 SERPL-ACNC: ABNORMAL U/ML

## 2024-11-06 RX ORDER — OXYCODONE HYDROCHLORIDE 10 MG/1
10 TABLET ORAL EVERY 6 HOURS PRN
Qty: 120 TABLET | Refills: 0 | Status: SHIPPED | OUTPATIENT
Start: 2024-11-06

## 2024-11-07 ENCOUNTER — PATIENT OUTREACH (OUTPATIENT)
Dept: OTHER | Facility: HOSPITAL | Age: 74
End: 2024-11-07
Payer: MEDICARE

## 2024-11-07 ENCOUNTER — OFFICE VISIT (OUTPATIENT)
Dept: ONCOLOGY | Facility: CLINIC | Age: 74
End: 2024-11-07
Payer: MEDICARE

## 2024-11-07 ENCOUNTER — HOSPITAL ENCOUNTER (OUTPATIENT)
Dept: ONCOLOGY | Facility: HOSPITAL | Age: 74
Discharge: HOME | End: 2024-11-07
Payer: MEDICARE

## 2024-11-07 VITALS
HEART RATE: 82 BPM | TEMPERATURE: 97.5 F | DIASTOLIC BLOOD PRESSURE: 61 MMHG | SYSTOLIC BLOOD PRESSURE: 124 MMHG | RESPIRATION RATE: 16 BRPM | BODY MASS INDEX: 20.61 KG/M2 | OXYGEN SATURATION: 96 % | HEIGHT: 62 IN | WEIGHT: 112 LBS

## 2024-11-07 DIAGNOSIS — C25.0 MALIGNANT TUMOR OF HEAD OF PANCREAS: Primary | ICD-10-CM

## 2024-11-07 DIAGNOSIS — Z45.2 ENCOUNTER FOR CARE RELATED TO PORT-A-CATH: ICD-10-CM

## 2024-11-07 PROCEDURE — 25810000003 SODIUM CHLORIDE 0.9 % SOLUTION: Performed by: INTERNAL MEDICINE

## 2024-11-07 PROCEDURE — 1126F AMNT PAIN NOTED NONE PRSNT: CPT | Performed by: INTERNAL MEDICINE

## 2024-11-07 PROCEDURE — 96413 CHEMO IV INFUSION 1 HR: CPT

## 2024-11-07 PROCEDURE — 96417 CHEMO IV INFUS EACH ADDL SEQ: CPT

## 2024-11-07 PROCEDURE — 25010000002 DEXAMETHASONE SODIUM PHOSPHATE 100 MG/10ML SOLUTION: Performed by: INTERNAL MEDICINE

## 2024-11-07 PROCEDURE — 25010000002 HEPARIN LOCK FLUSH PER 10 UNITS: Performed by: INTERNAL MEDICINE

## 2024-11-07 PROCEDURE — 25810000003 SODIUM CHLORIDE 0.9 % SOLUTION 250 ML FLEX CONT: Performed by: INTERNAL MEDICINE

## 2024-11-07 PROCEDURE — 99214 OFFICE O/P EST MOD 30 MIN: CPT | Performed by: INTERNAL MEDICINE

## 2024-11-07 PROCEDURE — 96375 TX/PRO/DX INJ NEW DRUG ADDON: CPT

## 2024-11-07 PROCEDURE — 25010000002 PACLITAXEL PROTEIN-BOUND PART PER 1 MG: Performed by: INTERNAL MEDICINE

## 2024-11-07 PROCEDURE — 25010000002 GEMCITABINE HCL 2 GM/20ML SOLUTION 20 ML VIAL: Performed by: INTERNAL MEDICINE

## 2024-11-07 RX ORDER — SODIUM CHLORIDE 0.9 % (FLUSH) 0.9 %
10 SYRINGE (ML) INJECTION AS NEEDED
OUTPATIENT
Start: 2024-11-07

## 2024-11-07 RX ORDER — HEPARIN SODIUM (PORCINE) LOCK FLUSH IV SOLN 100 UNIT/ML 100 UNIT/ML
500 SOLUTION INTRAVENOUS AS NEEDED
OUTPATIENT
Start: 2024-11-07

## 2024-11-07 RX ORDER — SODIUM CHLORIDE 9 MG/ML
20 INJECTION, SOLUTION INTRAVENOUS ONCE
Status: COMPLETED | OUTPATIENT
Start: 2024-11-07 | End: 2024-11-07

## 2024-11-07 RX ORDER — HEPARIN SODIUM (PORCINE) LOCK FLUSH IV SOLN 100 UNIT/ML 100 UNIT/ML
500 SOLUTION INTRAVENOUS AS NEEDED
Status: DISCONTINUED | OUTPATIENT
Start: 2024-11-07 | End: 2024-11-08 | Stop reason: HOSPADM

## 2024-11-07 RX ORDER — SODIUM CHLORIDE 0.9 % (FLUSH) 0.9 %
20 SYRINGE (ML) INJECTION AS NEEDED
OUTPATIENT
Start: 2024-11-07

## 2024-11-07 RX ORDER — PACLITAXEL 100 MG/20ML
200 INJECTION, POWDER, LYOPHILIZED, FOR SUSPENSION INTRAVENOUS ONCE
Status: COMPLETED | OUTPATIENT
Start: 2024-11-07 | End: 2024-11-07

## 2024-11-07 RX ADMIN — SODIUM CHLORIDE 20 ML/HR: 9 INJECTION, SOLUTION INTRAVENOUS at 11:54

## 2024-11-07 RX ADMIN — PACLITAXEL 200 MG: 100 INJECTION, POWDER, LYOPHILIZED, FOR SUSPENSION INTRAVENOUS at 12:19

## 2024-11-07 RX ADMIN — DEXAMETHASONE SODIUM PHOSPHATE 12 MG: 100 INJECTION INTRAMUSCULAR; INTRAVENOUS at 11:54

## 2024-11-07 RX ADMIN — GEMCITABINE 1500 MG: 100 INJECTION, SOLUTION INTRAVENOUS at 12:52

## 2024-11-07 RX ADMIN — HEPARIN 500 UNITS: 100 SYRINGE at 13:29

## 2024-11-07 NOTE — PROGRESS NOTES
PROBLEM LIST:  Oncology/Hematology History   Malignant tumor of head of pancreas   10/2/2024 Cancer Staged    Staging form: Exocrine Pancreas, AJCC 8th Edition  - Clinical stage from 10/2/2024: Stage IV (cT2, cN0, cM1) - Signed by Ni Jackson MD on 10/4/2024     10/3/2024 Initial Diagnosis    Malignant tumor of head of pancreas     10/10/2024 -  Chemotherapy    OP PANCREATIC PACLitaxel Protein-Bound / Gemcitabine         REASON FOR VISIT: Pancreatic cancer    HISTORY OF PRESENT ILLNESS:   74 y.o.  female presents today for follow-up of her pancreatic cancer.  She is undergoing chemotherapy with Abraxane and gemcitabine.  She received 2 doses.  Seems tolerated it reasonably well.    Her weight has somewhat stabilized.  Having trouble with heartburn. She does have some palable area in the lower sternal region.    Past medical history, social history and family history was reviewed 11/07/24 and unchanged from prior visit.    Review of Systems:    Review of Systems - Oncology         Medications:        Current Outpatient Medications:     amLODIPine-benazepril (LOTREL 5-10) 5-10 MG per capsule, Take 1 capsule by mouth Daily., Disp: , Rfl:     levothyroxine (SYNTHROID, LEVOTHROID) 50 MCG tablet, Take 1 tablet by mouth., Disp: , Rfl:     lidocaine-prilocaine (EMLA) 2.5-2.5 % cream, Apply 1 Application topically to the appropriate area as directed As Needed (45-60 minutes prior to port access.  Cover with saran/plastic wrap)., Disp: 30 g, Rfl: 2    ondansetron (ZOFRAN) 8 MG tablet, Take 1 tablet by mouth 3 (Three) Times a Day As Needed for Nausea or Vomiting., Disp: 30 tablet, Rfl: 5    ondansetron ODT (ZOFRAN-ODT) 8 MG disintegrating tablet, Place 1 tablet on the tongue Every 8 (Eight) Hours As Needed for Nausea or Vomiting., Disp: 30 tablet, Rfl: 5    oxyCODONE (ROXICODONE) 10 MG tablet, Take 1 tablet by mouth Every 6 (Six) Hours As Needed for Moderate Pain., Disp: 120 tablet, Rfl: 0    rosuvastatin (CRESTOR) 5  "MG tablet, Take 1 tablet by mouth Daily., Disp: , Rfl:   No current facility-administered medications for this visit.    Facility-Administered Medications Ordered in Other Visits:     Gemcitabine HCl (GEMZAR) 1,500 mg in sodium chloride 0.9 % 265 mL chemo IVPB, 1,000 mg/m2 (Treatment Plan Recorded), Intravenous, Once, Ni Jackson MD    heparin injection 500 Units, 500 Units, Intravenous, PRN, Ni Jackson MD    PACLitaxel-protein bound (ABRAXANE) chemo infusion 200 mg, 200 mg, Intravenous, Once, Ni Jackson MD, Last Rate: 80 mL/hr at 11/07/24 1219, 200 mg at 11/07/24 1219    Pain Medications               oxyCODONE (ROXICODONE) 10 MG tablet Take 1 tablet by mouth Every 6 (Six) Hours As Needed for Moderate Pain.               ALLERGIES:  No Known Allergies      Physical Exam    VITAL SIGNS:  /61 Comment: LUE  Pulse 82   Temp 97.5 °F (36.4 °C) (Infrared)   Resp 16   Ht 157.5 cm (62\")   Wt 50.8 kg (112 lb)   SpO2 96% Comment: RA  BMI 20.49 kg/m²     ECOG score: 1           Wt Readings from Last 3 Encounters:   11/07/24 50.8 kg (112 lb)   11/05/24 50.8 kg (112 lb 1.6 oz)   10/24/24 52.6 kg (116 lb)       Body mass index is 20.49 kg/m². Body surface area is 1.49 meters squared.       Performance Status: 1    General: well appearing, in no acute distress  HEENT: sclera anicteric, neck is supple  Lymphatics: no cervical, supraclavicular, or axillary adenopathy  Cardiovascular: regular rate and rhythm, no murmurs, rubs or gallops  Lungs: clear to auscultation bilaterally  Abdomen: soft, nontender, nondistended.  No palpable organomegaly  Extremities: no lower extremity edema  Skin: no rashes, lesions, bruising, or petechiae  Msk:  Shows no weakness of the large muscle groups  Psych: Mood is stable        RECENT LABS:    Lab Results   Component Value Date    HGB 10.3 (L) 11/05/2024    HCT 30.6 (L) 11/05/2024    MCV 97.8 (H) 11/05/2024     11/05/2024    WBC 7.15 11/05/2024    " NEUTROABS 4.30 11/05/2024    LYMPHSABS 1.13 11/05/2024    MONOSABS 1.38 (H) 11/05/2024    EOSABS 0.27 11/05/2024    BASOSABS 0.05 11/05/2024       Lab Results   Component Value Date    GLUCOSE 99 11/05/2024    BUN 9 11/05/2024    CREATININE 0.47 (L) 11/05/2024     (L) 11/05/2024    K 3.9 11/05/2024    CL 97 (L) 11/05/2024    CO2 22.0 11/05/2024    CALCIUM 8.6 11/05/2024    PROTEINTOT 6.2 11/05/2024    ALBUMIN 3.4 (L) 11/05/2024    BILITOT 0.5 11/05/2024    ALKPHOS 387 (H) 11/05/2024    AST 25 11/05/2024    ALT 22 11/05/2024     Lab Results   Component Value Date     13,030.0 (H) 11/05/2024     36,750.0 (H) 10/08/2024       XR Chest 1 View    Result Date: 10/7/2024  Impression: No acute cardiopulmonary findings. Electronically Signed: Merrill Duncan MD  10/7/2024 10:43 AM EDT  Workstation ID: [id number]    Mammo Screening Digital Tomosynthesis Bilateral With CAD    Result Date: 10/1/2024  FINAL IMPRESSION: ACR BI-RADS 2: Benign findings. RECOMMENDATIONS: Routine annual screening mammography. A letter including results and recommendations was sent to the patient. Density notification was provided to all patients. Patient information entered into a reminder system with a target due date for the next mammogram. At our facility, a [place] marker is positioned over a visible skin lesion and a linear marker is used to indicate a scar. A triangular marker is placed on a self reported palpable finding. Note: Mammography does not detect approximately 10-15% of breast cancers. An annual clinical breast exam by the patient's breast care physician and regular monthly self breast exams by the patient are integral parts of breast cancer screening, in addition to annual mammography. A normal mammogram does not completely exclude the presence of breast cancer, especially if there is an abnormal finding on physical exam. When clinically indicated, a biopsy should not be deferred because of a normal mammogram report. cc:      MRI Abdomen With & Without Contrast    Result Date: 9/25/2024  Impression: Pancreatic head mass measuring around 3 cm, likely adenocarcinoma. There is resultant pancreaticobiliary ductal obstruction as well as portosplenomesenteric venous occlusion. Innumerable hepatic metastases. Suspected omental nodularity and mesenteric lymphadenopathy, which may represent disease involvement. Right lower lobe pulmonary nodules. Electronically Signed: Merrill Duncan MD  9/25/2024 11:59 PM EDT  Workstation ID: [id number]    CT Abdomen Pelvis Without Contrast    Result Date: 9/24/2024  Impression: 1.There appears to be irregular enlargement of the pancreatic head concerning for pancreatic neoplasm with mild pancreatic duct dilation. MRI abdomen with and without contrast recommended. 2.Nonspecific central mesenteric edema with few anterior omental vague soft tissue density nodules which may represent lymph nodes. Omental metastatic implants not excluded 3.Slightly larger right lower lobe pulmonary nodules. Metastatic disease is a consideration. Electronically Signed: Kalen Murray MD  9/24/2024 3:39 PM EDT  Workstation ID: [id number]    CT Chest Low Dose Cancer Screening WO    Result Date: 8/14/2024  Impression: 1. There are several new subcentimeter subpleural noncalcified nodules in the right lower lobe and one in the right upper lobe measuring up to 5 mm. These are favored to be infectious or inflammatory in etiology but close follow-up is recommended. These are below the resolution for PET. A follow-up chest CT in 6 months is recommended. 2.Mild emphysema with stable biapical pleural thickening and evidence of prior granulomatous disease are unchanged. 3. Recommendation: 6 month follow up with LDCT Lung Rads Assessment: Lung-RADS L3 - Probably benign, 1-2% chance of malignancy. Electronically Signed: Puneet Hagen DO  8/14/2024 10:16 PM EDT  Workstation ID: [id number]          Assessment/Plan    1.  Stage IV adenocarcinoma of the  pancreas.  Did NexGen sequencing on the blood with no obvious targetable mutation.  For now we will continue Abraxane and gemcitabine every other week.  I think she is having a response with tumor markers improving.  More importantly she is tolerating it without significant side effects.  Plan to repeat scans towards end of December.    2.  Heartburn secondary to chemotherapy.  on PPI and baking soda with water.  If this does not help her she is likely having bile reflux and will need some form of acidic source to neutralize it.    3.  Constipation.  Significant issues with constipation gave her a good bowel regimen including MiraLAX twice a day along with stool softener        Total time of patient care on day of service including time prior to, face to face with patient, and following visit spent in reviewing records, lab results, imaging studies, discussion with patient, and documentation/charting was > 32 minutes.     Ni Jackson MD  [facility] Hematology and Oncology    Return on: 12/05/24    No orders of the defined types were placed in this encounter.      11/7/2024     Future Appointments         Provider Department Center    11/11/2024 11:00 AM Michelle Arroyo PA-C [facility] PALLIATIVE CARE TC    11/19/2024 3:00 PM ACCESS AND LAB DRAW CHAIR 1 [facility] OUTPATIENT ONCOLOGY TC    11/21/2024 11:00 AM CHAIR 4 [facility] OUTPATIENT ONCOLOGY TC    12/3/2024 3:00 PM ACCESS AND LAB DRAW CHAIR 1 [facility] OUTPATIENT ONCOLOGY TC    12/5/2024 9:45 AM (Arrive by 9:30 AM) Ni Jackson MD [facility] HEMATOLOGY & ONCOLOGY TC    12/5/2024 10:30 AM CHAIR 12 [facility] OUTPATIENT ONCOLOGY TC

## 2024-11-11 ENCOUNTER — TELEPHONE (OUTPATIENT)
Dept: GENETICS | Facility: HOSPITAL | Age: 74
End: 2024-11-11
Payer: MEDICARE

## 2024-11-11 ENCOUNTER — DOCUMENTATION (OUTPATIENT)
Dept: GENETICS | Facility: HOSPITAL | Age: 74
End: 2024-11-11
Payer: MEDICARE

## 2024-11-11 NOTE — TELEPHONE ENCOUNTER
Spoke with patient and disclosed genetic testing results. Informed the patient these results would be listed on her [employer] account and sent to her provider. Full documentation from genetic counselor to follow.

## 2024-11-11 NOTE — PROGRESS NOTES
Claudia Flores, a 74-year-old female, was seen for genetic counseling due to a personal history of pancreatic cancer. Genetic counseling was provided via telephone. Ms. Flores confirmed her name, date of birth, and that she was in [place] at the time of her appointment. She was recently diagnosed with pancreatic cancer at age 73. She is undergoing chemotherapy. She also has a history of basal cell skin cancer. Ms. Flores was 11 years old at menarche and had her first child at age 29. She is post-menopausal and retains her uterus and ovaries. Her most recent mammogram was earlier this month and showed heterogeneously dense tissue but was otherwise normal. Her was most recently colonoscopy was in 2023, and she reports a history of less than five polyps. She was interested in discussing her risk for a hereditary cancer syndrome. Ms. Flores was interested in pursuing a multi-gene panel to evaluate her risk of cancer, therefore the CancerNext Expanded panel was ordered through [employer] which analyzes BRCA1/2 and 69 additional genes associated with an increased cancer risk. Genetic testing was negative for known deleterious/pathogenic (harmful) mutations for the genes on this panel. While no known deleterious mutations were identified, a variant of uncertain significance (VUS) was identified in the KIMANI gene. VUSs are changes in DNA that may or may not affect the function of the gene. VUSs are frequently identified through multigene panel testing, given the number of genes being evaluated and the presence of genetic variation in the population. The majority (estimated to be >90%) of VUS's are eventually reclassified as benign gene changes. It is not recommended that any unaffected relatives be tested for a VUS at this time, and management should not be altered based on a VUS. These results were discussed with Ms. Flores by telephone on 11/11/24.    PERTINENT FAMILY HISTORY: (See attached pedigree)   Sister:   Breast  cancer, 50  Mat Grandmother: Ovarian cancer, 60    Medical records regarding the family history were not available for review.      RISK ASSESSMENT:  Ms. Flores's personal history of pancreatic cancer led to concern for a hereditary cancer syndrome. We discussed BRCA1/2 testing as well as the option of pursuing a panel that would test for other genes known to impact cancer risk in addition to BRCA1/2. Ms. Flores clearly meets NCCN guidelines criteria for BRCA1/2 testing based on her personal history of pancreatic cancer. These risk assessments are based on the family history information provided at the time of the appointment and could change in the future should new information be obtained.    GENETIC COUNSELING (30 minutes):  We reviewed the family history information in detail. Cases of cancer follow three general patterns: sporadic, familial, and hereditary. While most cancer is sporadic, some cases appear to occur in family clusters. These cases are said to be familial and account for 10-20% of cancer cases. Familial cases may be due to a combination of shared genes and environmental factors among family members. In even fewer families, the cancer is said to be inherited, and the genes responsible for the cancer are known.      Family histories typical of hereditary cancer syndromes usually include multiple first- and second-degree relatives diagnosed with cancer types that define a syndrome. These cases tend to be diagnosed at younger-than-expected ages and can be bilateral or multifocal. The cancer in these families follows an autosomal dominant inheritance pattern, which indicates the likely presence of a mutation in a cancer susceptibility gene. Children and siblings of an individual believed to carry this mutation have a 50% chance of inheriting that mutation, thereby inheriting the increased risk to develop cancer. These mutations can be passed down from the maternal or the paternal lineage.    Hereditary  pancreatic cancer accounts for approximately 10% of all cases of pancreatic cancer. The gene most often associated with a hereditary risk for pancreatic cancer is BRCA2. Mutations in BRCA2 confer up to a 7% risk for pancreatic cancer. Mutations in BRCA1 and BRCA2 also confer an increased risk for breast cancer, ovarian cancer, male breast cancer, and prostate cancer. Women with a BRCA1 or BRCA2 mutation have over a 60% risk of breast cancer and up to a 58% risk of ovarian cancer. Alfred syndrome is a hereditary colon cancer syndrome that is associated with pancreatic cancer. The CDKN2A gene is associated with increased risks for pancreatic cancer and melanoma. We reviewed that, in some cases, the identification of a genetic mutation may impact treatment options for some types of cancer. In order to get as much information as possible regarding potential underlying causes, as well as risks for her family, Ms. Flores opted to pursue testing through a panel that would look at multiple genes associated with hereditary risk for cancer.    GENETIC TESTING:  The risks, benefits and limitations of genetic testing and implications for clinical management following testing were reviewed. DNA test results can influence decisions regarding screening, prevention and surgical management. Genetic testing can have significant psychological implications for both individuals and families. Also discussed was the possibility of employment and insurance discrimination based on genetic test results and the laws in place to prevent this ([place]).    We discussed panel testing, which would involve testing for BRCA1/2 as well as 69 additional genes that are associated with increased cancer risk. The benefits and limitations of genetic testing were discussed, and Ms. Flores decided to pursue testing via the panel. The implications of a positive or negative test result were discussed. We discussed the possibility that, in some cases, genetic  test results may be informative or may be ambiguous due to the identification of a genetic variant. These variants may or may not be associated with an increased cancer risk. With multigene panel testing, it is not uncommon for a variant of uncertain significance (VUS) to be identified. If a VUS is identified, testing family members is typically not recommended and screening recommendations are made based on the family history. The laboratories that perform genetic testing work to reclassify the VUS and send out an amended report if and when a VUS is reclassified. The majority of variant findings are ultimately reclassified to a negative result. Given her personal and family history, a negative test result would not eliminate all cancer risk to her relatives, although the risk would not be as high as it would with positive genetic testing.      TEST RESULTS: Genetic testing was negative for known deleterious mutations by sequencing, rearrangement testing, and RNA analysis of the 71 genes on the CancerNext Expanded panel at [facility]. This negative result greatly lowers the risk of a hereditary cancer syndrome for Ms. Flores.    A VUS was identified in the KIMANI gene. A VUS is a difference within a gene that may or may not impact the function of the gene. VUSs are not clinically actionable findings, and therefore this result does not impact management in any way. The majority of VUSs are ultimately reclassified to benign variants. The identification of a VUS is common in multigene panel testing, given the number of genes being evaluated and the presence of genetic variation in the population. If this variant is ever reclassified, a new report will be issued by the laboratory and released directly to the ordering physician. This assessment is based on the information provided at the time of the consultation.      PLAN: Genetic counseling remains available to Ms. Flores and her family. If she has any questions in the future,  she is welcome to call our genetics team at [phone].      Charleen Lechuga MS, [facility], [facility]  Licensed Certified Genetic Counselor      Cc: GERDA Drummond

## 2024-11-18 NOTE — PROGRESS NOTES
Palliative Clinic Note      Name: Claudia Flores  Age: 74 y.o.  Sex: female  : 1950  MRN: [id number]  Date of Service: 2024   Referring Physician: Ni Jackson MD    Subjective:    Chief Complaint: Fatigue, abdominal pain    History of Present Illness: Claudia Flores is a 74 y.o. female with past medical history significant for pancreatic cancer, hypertension, hypothyroidism, hyperlipidemia who presents to the palliative clinic today to establish care.     Treatment summary: The patient presented with unintentional weight loss. Imaging revealed a pancreatic head mass along with findings suspicious for metastatic disease in the lung and liver. Patient started chemotherapy with gemcitabine with Abraxane in 10/2024.     Pain: The patient c/o abdominal pain controlled with oxycodone 10 mg q6h PRN. She denies significant side effects from the opioid therapy but admits to some constipation. Additionally, the patient complains of a bulge in her abdomen that is sore.     Other symptoms: The patient c/o fatigue worse in the afternoons. The patient often takes a nap in the afternoon. She denies issues with sleep during the night. The patient denies nausea or vomiting. She manages opioid induced constipation with stool softener and Miralax as needed. The patient's appetite fluctuates. She has met with an oncology RD.     Pyschosocial: The patient lives with her . They have 1 daughter and 2 grand children that live nearby. The patient is a retired teacher. She enjoys traveling. The is a member at [employer]. No allergies. She admits to smoking 1/2 PPD. She reports rare alcohol use and no illicit drug use. No history of mental illness. Patient denies anxiety or depression.     Spiritual: Church.     Goals: Maximize comfort, optimize function & psychosocial wellbeing, and promote advanced care planning.    The following portions of the patient's history were reviewed and updated as  appropriate: allergies, current medications, past family history, past medical history, past social history, past surgical history and problem list.    Decisional capacity:Full  ORT-R: Low risk  PHQ-9: 5-9 (Mild Depression)  CYNTHIA: 1  ECOG: (1) Restricted in physically strenuous activity, ambulatory and able to do work of light nature     Objective:    /67   Pulse 86   Temp 97.1 °F (36.2 °C) (Temporal)   Resp 18   Wt 49.3 kg (108 lb 11.2 oz)   SpO2 100%   BMI 19.88 kg/m²     Constitutional: Awake, alert, normal gait, sitting up in exam chair, in no acute distress  Eyes: PERRLA, EOMS intact  HENT: NCAT, face symmetric  Neck: Supple, trachea midline  Respiratory: Nonlabored respirations  Cardiovascular: RRR, no edema observed  Gastrointestinal: Soft, no guarding  Musculoskeletal: Moves all extremities   Psychiatric: Appropriate affect, cooperative  Neurologic: Oriented x 3, Cranial Nerves grossly intact to confrontation, speech clear  Skin: Cool dry, no rashes or wounds appreciated     Medication Counts: Instructed to bring controlled medications to all appointments.   I have reviewed the patient's [place] PDMP. [facility] Req #[id number] .   UDS: Collected today. Results pending.    Assessment & Plan:    1. Malignant tumor of head of pancreas  - Patient started chemotherapy with gemcitabine with Abraxane in 10/2024. Patient tolerating treatment well so far.     2. Cancer related pain  - We discussed goals for pain relief, risks associated with opioid therapy, proper use, safe storage and disposal of opioids. We recommend the patient be supervised for the first few days when starting a new medication or dose and always start the new medication or dose in the morning. We reviewed our universal surveillance strategies including urine drug screens, LETICIA reports, pill counts and risk assessment screenings. The Consent for Treatment with Controlled Substances and Controlled Substance Prescribing Agreement were both  reviewed, signed, and scanned into the chart. The patient was given a copy of the Controlled Substance Education handout. Provided the patient signs to identify an opioid overdose. Educated the patient on the steps to respond to an overdose and administration of naloxone. Prescription for naloxone nasal spray sent to the patient's pharmacy. Plan to continue oxycodone 10 mg q6h PRN. Next refill sent to the pharmacy.     3. Neoplastic malignant related fatigue  - Discussed pharmacotherapy. Patient would like to manage without medication at this time.     4. Therapeutic drug monitoring  - Ordered Urine Drug Screen per new patient visit. Patient will complete at her convenience.     Advanced directives: Yes, Living Will    Return in about 1 month (around 12/19/2024) for Office Visit.    I spent 60 minutes caring for Claudia Flores on this date of service. This time includes time spent by me in the following activities: preparing for the visit, reviewing tests, obtaining and/or reviewing a separately obtained history, performing a medically appropriate examination and/or evaluation , counseling and educating the patient/family/caregiver, ordering medications, tests, or procedures, documenting information in the medical record, independently interpreting results and communicating that information with the patient/family/caregiver, and care coordination    Michelle Arroyo PA-C  11/19/2024    Medication Date Filled # Filled Count Used # Days  ISAC   Oxycodone 10 11/6/24 120 -- -- -- --   Tramadol 50 9/28/24 20 -- -- -- --   Percocet 5 3/19/24 42 -- -- -- --   Gabapentin 300 2/5/24 180 -- -- -- --

## 2024-11-19 ENCOUNTER — HOSPITAL ENCOUNTER (OUTPATIENT)
Dept: ONCOLOGY | Facility: HOSPITAL | Age: 74
Discharge: HOME | End: 2024-11-19
Admitting: INTERNAL MEDICINE
Payer: MEDICARE

## 2024-11-19 ENCOUNTER — OFFICE VISIT (OUTPATIENT)
Dept: PALLIATIVE CARE | Facility: CLINIC | Age: 74
End: 2024-11-19
Payer: MEDICARE

## 2024-11-19 VITALS
HEIGHT: 62 IN | DIASTOLIC BLOOD PRESSURE: 62 MMHG | SYSTOLIC BLOOD PRESSURE: 131 MMHG | WEIGHT: 108 LBS | RESPIRATION RATE: 18 BRPM | TEMPERATURE: 97.4 F | HEART RATE: 82 BPM | BODY MASS INDEX: 19.88 KG/M2

## 2024-11-19 VITALS
OXYGEN SATURATION: 100 % | DIASTOLIC BLOOD PRESSURE: 67 MMHG | WEIGHT: 108.7 LBS | HEART RATE: 86 BPM | BODY MASS INDEX: 19.88 KG/M2 | SYSTOLIC BLOOD PRESSURE: 123 MMHG | RESPIRATION RATE: 18 BRPM | TEMPERATURE: 97.1 F

## 2024-11-19 DIAGNOSIS — C25.0 MALIGNANT TUMOR OF HEAD OF PANCREAS: ICD-10-CM

## 2024-11-19 DIAGNOSIS — C25.0 MALIGNANT TUMOR OF HEAD OF PANCREAS: Primary | ICD-10-CM

## 2024-11-19 DIAGNOSIS — G89.3 CANCER RELATED PAIN: ICD-10-CM

## 2024-11-19 DIAGNOSIS — Z45.2 ENCOUNTER FOR CARE RELATED TO PORT-A-CATH: ICD-10-CM

## 2024-11-19 DIAGNOSIS — Z51.81 THERAPEUTIC DRUG MONITORING: Primary | ICD-10-CM

## 2024-11-19 DIAGNOSIS — R53.0 NEOPLASTIC MALIGNANT RELATED FATIGUE: ICD-10-CM

## 2024-11-19 LAB
ALBUMIN SERPL-MCNC: 3.5 G/DL (ref 3.5–5.2)
ALBUMIN/GLOB SERPL: 1.1 G/DL
ALP SERPL-CCNC: 436 U/L (ref 39–117)
ALT SERPL W P-5'-P-CCNC: 18 U/L (ref 1–33)
ANION GAP SERPL CALCULATED.3IONS-SCNC: 11 MMOL/L (ref 5–15)
AST SERPL-CCNC: 22 U/L (ref 1–32)
BASOPHILS # BLD AUTO: 0.06 10*3/MM3 (ref 0–0.2)
BASOPHILS NFR BLD AUTO: 0.9 % (ref 0–1.5)
BILIRUB SERPL-MCNC: 0.4 MG/DL (ref 0–1.2)
BUN SERPL-MCNC: 9 MG/DL (ref 8–23)
BUN/CREAT SERPL: 20.9 (ref 7–25)
CALCIUM SPEC-SCNC: 8.7 MG/DL (ref 8.6–10.5)
CHLORIDE SERPL-SCNC: 96 MMOL/L (ref 98–107)
CO2 SERPL-SCNC: 23 MMOL/L (ref 22–29)
CREAT SERPL-MCNC: 0.43 MG/DL (ref 0.57–1)
DEPRECATED RDW RBC AUTO: 44 FL (ref 37–54)
EGFRCR SERPLBLD CKD-EPI 2021: 100.9 ML/MIN/1.73
EOSINOPHIL # BLD AUTO: 0.16 10*3/MM3 (ref 0–0.4)
EOSINOPHIL NFR BLD AUTO: 2.3 % (ref 0.3–6.2)
ERYTHROCYTE [DISTWIDTH] IN BLOOD BY AUTOMATED COUNT: 12.7 % (ref 12.3–15.4)
GLOBULIN UR ELPH-MCNC: 3.1 GM/DL
GLUCOSE SERPL-MCNC: 127 MG/DL (ref 65–99)
HCT VFR BLD AUTO: 31.1 % (ref 34–46.6)
HGB BLD-MCNC: 10.3 G/DL (ref 12–15.9)
IMM GRANULOCYTES # BLD AUTO: 0.03 10*3/MM3 (ref 0–0.05)
IMM GRANULOCYTES NFR BLD AUTO: 0.4 % (ref 0–0.5)
LYMPHOCYTES # BLD AUTO: 0.98 10*3/MM3 (ref 0.7–3.1)
LYMPHOCYTES NFR BLD AUTO: 14.4 % (ref 19.6–45.3)
MCH RBC QN AUTO: 31.5 PG (ref 26.6–33)
MCHC RBC AUTO-ENTMCNC: 33.1 G/DL (ref 31.5–35.7)
MCV RBC AUTO: 95.1 FL (ref 79–97)
MONOCYTES # BLD AUTO: 1 10*3/MM3 (ref 0.1–0.9)
MONOCYTES NFR BLD AUTO: 14.7 % (ref 5–12)
NEUTROPHILS NFR BLD AUTO: 4.58 10*3/MM3 (ref 1.7–7)
NEUTROPHILS NFR BLD AUTO: 67.3 % (ref 42.7–76)
PLATELET # BLD AUTO: 276 10*3/MM3 (ref 140–450)
PMV BLD AUTO: 10 FL (ref 6–12)
POTASSIUM SERPL-SCNC: 4 MMOL/L (ref 3.5–5.2)
PROT SERPL-MCNC: 6.6 G/DL (ref 6–8.5)
RBC # BLD AUTO: 3.27 10*6/MM3 (ref 3.77–5.28)
SODIUM SERPL-SCNC: 130 MMOL/L (ref 136–145)
WBC NRBC COR # BLD AUTO: 6.81 10*3/MM3 (ref 3.4–10.8)

## 2024-11-19 PROCEDURE — 85025 COMPLETE CBC W/AUTO DIFF WBC: CPT | Performed by: INTERNAL MEDICINE

## 2024-11-19 PROCEDURE — 36591 DRAW BLOOD OFF VENOUS DEVICE: CPT

## 2024-11-19 PROCEDURE — 80053 COMPREHEN METABOLIC PANEL: CPT | Performed by: INTERNAL MEDICINE

## 2024-11-19 PROCEDURE — 1160F RVW MEDS BY RX/DR IN RCRD: CPT | Performed by: PHYSICIAN ASSISTANT

## 2024-11-19 PROCEDURE — 99205 OFFICE O/P NEW HI 60 MIN: CPT | Performed by: PHYSICIAN ASSISTANT

## 2024-11-19 PROCEDURE — 1159F MED LIST DOCD IN RCRD: CPT | Performed by: PHYSICIAN ASSISTANT

## 2024-11-19 PROCEDURE — 25010000002 HEPARIN LOCK FLUSH PER 10 UNITS: Performed by: INTERNAL MEDICINE

## 2024-11-19 PROCEDURE — 1125F AMNT PAIN NOTED PAIN PRSNT: CPT | Performed by: PHYSICIAN ASSISTANT

## 2024-11-19 RX ORDER — HEPARIN SODIUM (PORCINE) LOCK FLUSH IV SOLN 100 UNIT/ML 100 UNIT/ML
500 SOLUTION INTRAVENOUS AS NEEDED
Status: DISCONTINUED | OUTPATIENT
Start: 2024-11-19 | End: 2024-11-20 | Stop reason: HOSPADM

## 2024-11-19 RX ORDER — SODIUM CHLORIDE 0.9 % (FLUSH) 0.9 %
20 SYRINGE (ML) INJECTION AS NEEDED
Status: CANCELLED | OUTPATIENT
Start: 2024-11-19

## 2024-11-19 RX ORDER — HEPARIN SODIUM (PORCINE) LOCK FLUSH IV SOLN 100 UNIT/ML 100 UNIT/ML
500 SOLUTION INTRAVENOUS AS NEEDED
Status: CANCELLED | OUTPATIENT
Start: 2024-11-19

## 2024-11-19 RX ORDER — SODIUM CHLORIDE 0.9 % (FLUSH) 0.9 %
10 SYRINGE (ML) INJECTION AS NEEDED
Status: CANCELLED | OUTPATIENT
Start: 2024-11-19

## 2024-11-19 RX ADMIN — HEPARIN 500 UNITS: 100 SYRINGE at 15:10

## 2024-11-19 NOTE — TELEPHONE ENCOUNTER
I have reviewed patient's LETICIA report prior to prescribing Schedule II, III, and IV medications. Request # [id number]. Next refill for Oxycodone 10 mg tablets q6h PRN #120 was sent to the pharmacy. The patient is scheduled to follow-up in 1 month.

## 2024-11-20 ENCOUNTER — PATIENT ROUNDING (BHMG ONLY) (OUTPATIENT)
Dept: PALLIATIVE CARE | Facility: CLINIC | Age: 74
End: 2024-11-20
Payer: MEDICARE

## 2024-11-20 RX ORDER — OXYCODONE HYDROCHLORIDE 10 MG/1
10 TABLET ORAL EVERY 6 HOURS PRN
Qty: 120 TABLET | Refills: 0 | Status: SHIPPED | OUTPATIENT
Start: 2024-12-06 | End: 2025-01-05

## 2024-11-21 ENCOUNTER — HOSPITAL ENCOUNTER (OUTPATIENT)
Dept: ONCOLOGY | Facility: HOSPITAL | Age: 74
Discharge: HOME | End: 2024-11-21
Admitting: INTERNAL MEDICINE
Payer: MEDICARE

## 2024-11-21 VITALS
WEIGHT: 107 LBS | RESPIRATION RATE: 16 BRPM | SYSTOLIC BLOOD PRESSURE: 142 MMHG | HEIGHT: 62 IN | HEART RATE: 95 BPM | DIASTOLIC BLOOD PRESSURE: 63 MMHG | TEMPERATURE: 97.2 F | BODY MASS INDEX: 19.69 KG/M2

## 2024-11-21 DIAGNOSIS — C25.0 MALIGNANT TUMOR OF HEAD OF PANCREAS: Primary | ICD-10-CM

## 2024-11-21 DIAGNOSIS — Z45.2 ENCOUNTER FOR CARE RELATED TO PORT-A-CATH: ICD-10-CM

## 2024-11-21 PROCEDURE — 25010000002 HEPARIN LOCK FLUSH PER 10 UNITS: Performed by: INTERNAL MEDICINE

## 2024-11-21 PROCEDURE — 25810000003 SODIUM CHLORIDE 0.9 % SOLUTION: Performed by: INTERNAL MEDICINE

## 2024-11-21 PROCEDURE — 25010000002 PACLITAXEL PROTEIN-BOUND PART PER 1 MG: Performed by: INTERNAL MEDICINE

## 2024-11-21 PROCEDURE — 25010000002 GEMCITABINE HCL 2 GM/20ML SOLUTION 20 ML VIAL: Performed by: INTERNAL MEDICINE

## 2024-11-21 PROCEDURE — 25010000002 DEXAMETHASONE SODIUM PHOSPHATE 100 MG/10ML SOLUTION: Performed by: INTERNAL MEDICINE

## 2024-11-21 PROCEDURE — 96417 CHEMO IV INFUS EACH ADDL SEQ: CPT

## 2024-11-21 PROCEDURE — 25810000003 SODIUM CHLORIDE 0.9 % SOLUTION 250 ML FLEX CONT: Performed by: INTERNAL MEDICINE

## 2024-11-21 PROCEDURE — 96375 TX/PRO/DX INJ NEW DRUG ADDON: CPT

## 2024-11-21 PROCEDURE — 96413 CHEMO IV INFUSION 1 HR: CPT

## 2024-11-21 RX ORDER — HEPARIN SODIUM (PORCINE) LOCK FLUSH IV SOLN 100 UNIT/ML 100 UNIT/ML
500 SOLUTION INTRAVENOUS AS NEEDED
Status: DISCONTINUED | OUTPATIENT
Start: 2024-11-21 | End: 2024-11-22 | Stop reason: HOSPADM

## 2024-11-21 RX ORDER — HEPARIN SODIUM (PORCINE) LOCK FLUSH IV SOLN 100 UNIT/ML 100 UNIT/ML
500 SOLUTION INTRAVENOUS AS NEEDED
OUTPATIENT
Start: 2024-11-21

## 2024-11-21 RX ORDER — PACLITAXEL 100 MG/20ML
200 INJECTION, POWDER, LYOPHILIZED, FOR SUSPENSION INTRAVENOUS ONCE
Status: COMPLETED | OUTPATIENT
Start: 2024-11-21 | End: 2024-11-21

## 2024-11-21 RX ORDER — SODIUM CHLORIDE 9 MG/ML
20 INJECTION, SOLUTION INTRAVENOUS ONCE
Status: COMPLETED | OUTPATIENT
Start: 2024-11-21 | End: 2024-11-21

## 2024-11-21 RX ORDER — SODIUM CHLORIDE 0.9 % (FLUSH) 0.9 %
20 SYRINGE (ML) INJECTION AS NEEDED
OUTPATIENT
Start: 2024-11-21

## 2024-11-21 RX ORDER — SODIUM CHLORIDE 0.9 % (FLUSH) 0.9 %
10 SYRINGE (ML) INJECTION AS NEEDED
OUTPATIENT
Start: 2024-11-21

## 2024-11-21 RX ADMIN — GEMCITABINE 1500 MG: 100 INJECTION, SOLUTION INTRAVENOUS at 12:38

## 2024-11-21 RX ADMIN — HEPARIN 500 UNITS: 100 SYRINGE at 13:15

## 2024-11-21 RX ADMIN — DEXAMETHASONE SODIUM PHOSPHATE 12 MG: 100 INJECTION INTRAMUSCULAR; INTRAVENOUS at 11:14

## 2024-11-21 RX ADMIN — PACLITAXEL 200 MG: 100 INJECTION, POWDER, LYOPHILIZED, FOR SUSPENSION INTRAVENOUS at 12:09

## 2024-11-21 RX ADMIN — SODIUM CHLORIDE 20 ML/HR: 9 INJECTION, SOLUTION INTRAVENOUS at 11:10

## 2024-12-03 ENCOUNTER — HOSPITAL ENCOUNTER (OUTPATIENT)
Dept: ONCOLOGY | Facility: HOSPITAL | Age: 74
Discharge: HOME | End: 2024-12-03
Admitting: INTERNAL MEDICINE
Payer: MEDICARE

## 2024-12-03 VITALS
DIASTOLIC BLOOD PRESSURE: 57 MMHG | TEMPERATURE: 96.9 F | RESPIRATION RATE: 16 BRPM | BODY MASS INDEX: 19.51 KG/M2 | HEART RATE: 91 BPM | WEIGHT: 106 LBS | HEIGHT: 62 IN | SYSTOLIC BLOOD PRESSURE: 119 MMHG

## 2024-12-03 DIAGNOSIS — Z45.2 ENCOUNTER FOR CARE RELATED TO PORT-A-CATH: ICD-10-CM

## 2024-12-03 DIAGNOSIS — C25.0 MALIGNANT TUMOR OF HEAD OF PANCREAS: Primary | ICD-10-CM

## 2024-12-03 LAB
ALBUMIN SERPL-MCNC: 3.4 G/DL (ref 3.5–5.2)
ALBUMIN/GLOB SERPL: 1.1 G/DL
ALP SERPL-CCNC: 361 U/L (ref 39–117)
ALT SERPL W P-5'-P-CCNC: 14 U/L (ref 1–33)
ANION GAP SERPL CALCULATED.3IONS-SCNC: 11 MMOL/L (ref 5–15)
AST SERPL-CCNC: 18 U/L (ref 1–32)
BASOPHILS # BLD AUTO: 0.04 10*3/MM3 (ref 0–0.2)
BASOPHILS NFR BLD AUTO: 0.5 % (ref 0–1.5)
BILIRUB SERPL-MCNC: 0.5 MG/DL (ref 0–1.2)
BUN SERPL-MCNC: 9 MG/DL (ref 8–23)
BUN/CREAT SERPL: 22 (ref 7–25)
CALCIUM SPEC-SCNC: 8.7 MG/DL (ref 8.6–10.5)
CANCER AG19-9 SERPL-ACNC: 5709 U/ML
CHLORIDE SERPL-SCNC: 95 MMOL/L (ref 98–107)
CO2 SERPL-SCNC: 23 MMOL/L (ref 22–29)
CREAT SERPL-MCNC: 0.41 MG/DL (ref 0.57–1)
DEPRECATED RDW RBC AUTO: 46.7 FL (ref 37–54)
EGFRCR SERPLBLD CKD-EPI 2021: 102.1 ML/MIN/1.73
EOSINOPHIL # BLD AUTO: 0.24 10*3/MM3 (ref 0–0.4)
EOSINOPHIL NFR BLD AUTO: 3.1 % (ref 0.3–6.2)
ERYTHROCYTE [DISTWIDTH] IN BLOOD BY AUTOMATED COUNT: 13.3 % (ref 12.3–15.4)
GLOBULIN UR ELPH-MCNC: 3 GM/DL
GLUCOSE SERPL-MCNC: 131 MG/DL (ref 65–99)
HCT VFR BLD AUTO: 31.1 % (ref 34–46.6)
HGB BLD-MCNC: 10.1 G/DL (ref 12–15.9)
IMM GRANULOCYTES # BLD AUTO: 0.02 10*3/MM3 (ref 0–0.05)
IMM GRANULOCYTES NFR BLD AUTO: 0.3 % (ref 0–0.5)
LYMPHOCYTES # BLD AUTO: 0.97 10*3/MM3 (ref 0.7–3.1)
LYMPHOCYTES NFR BLD AUTO: 12.5 % (ref 19.6–45.3)
MCH RBC QN AUTO: 31.3 PG (ref 26.6–33)
MCHC RBC AUTO-ENTMCNC: 32.5 G/DL (ref 31.5–35.7)
MCV RBC AUTO: 96.3 FL (ref 79–97)
MONOCYTES # BLD AUTO: 1.13 10*3/MM3 (ref 0.1–0.9)
MONOCYTES NFR BLD AUTO: 14.6 % (ref 5–12)
NEUTROPHILS NFR BLD AUTO: 5.33 10*3/MM3 (ref 1.7–7)
NEUTROPHILS NFR BLD AUTO: 69 % (ref 42.7–76)
PLATELET # BLD AUTO: 239 10*3/MM3 (ref 140–450)
PMV BLD AUTO: 9.8 FL (ref 6–12)
POTASSIUM SERPL-SCNC: 4 MMOL/L (ref 3.5–5.2)
PROT SERPL-MCNC: 6.4 G/DL (ref 6–8.5)
RBC # BLD AUTO: 3.23 10*6/MM3 (ref 3.77–5.28)
SODIUM SERPL-SCNC: 129 MMOL/L (ref 136–145)
WBC NRBC COR # BLD AUTO: 7.73 10*3/MM3 (ref 3.4–10.8)

## 2024-12-03 PROCEDURE — 80053 COMPREHEN METABOLIC PANEL: CPT | Performed by: INTERNAL MEDICINE

## 2024-12-03 PROCEDURE — 86301 IMMUNOASSAY TUMOR CA 19-9: CPT | Performed by: INTERNAL MEDICINE

## 2024-12-03 PROCEDURE — 85025 COMPLETE CBC W/AUTO DIFF WBC: CPT | Performed by: INTERNAL MEDICINE

## 2024-12-03 PROCEDURE — 36591 DRAW BLOOD OFF VENOUS DEVICE: CPT

## 2024-12-03 PROCEDURE — 25010000002 HEPARIN LOCK FLUSH PER 10 UNITS: Performed by: INTERNAL MEDICINE

## 2024-12-03 RX ORDER — SODIUM CHLORIDE 0.9 % (FLUSH) 0.9 %
10 SYRINGE (ML) INJECTION AS NEEDED
Status: CANCELLED | OUTPATIENT
Start: 2024-12-03

## 2024-12-03 RX ORDER — HEPARIN SODIUM (PORCINE) LOCK FLUSH IV SOLN 100 UNIT/ML 100 UNIT/ML
500 SOLUTION INTRAVENOUS AS NEEDED
Status: CANCELLED | OUTPATIENT
Start: 2024-12-03

## 2024-12-03 RX ORDER — SODIUM CHLORIDE 0.9 % (FLUSH) 0.9 %
10 SYRINGE (ML) INJECTION AS NEEDED
Status: DISCONTINUED | OUTPATIENT
Start: 2024-12-03 | End: 2024-12-04 | Stop reason: HOSPADM

## 2024-12-03 RX ORDER — HEPARIN SODIUM (PORCINE) LOCK FLUSH IV SOLN 100 UNIT/ML 100 UNIT/ML
500 SOLUTION INTRAVENOUS AS NEEDED
Status: DISCONTINUED | OUTPATIENT
Start: 2024-12-03 | End: 2024-12-04 | Stop reason: HOSPADM

## 2024-12-03 RX ORDER — SODIUM CHLORIDE 0.9 % (FLUSH) 0.9 %
20 SYRINGE (ML) INJECTION AS NEEDED
Status: CANCELLED | OUTPATIENT
Start: 2024-12-03

## 2024-12-03 RX ADMIN — Medication 10 ML: at 15:01

## 2024-12-03 RX ADMIN — HEPARIN 500 UNITS: 100 SYRINGE at 15:01

## 2024-12-05 ENCOUNTER — HOSPITAL ENCOUNTER (OUTPATIENT)
Dept: ONCOLOGY | Facility: HOSPITAL | Age: 74
Discharge: HOME | End: 2024-12-05
Payer: MEDICARE

## 2024-12-05 ENCOUNTER — OFFICE VISIT (OUTPATIENT)
Dept: ONCOLOGY | Facility: CLINIC | Age: 74
End: 2024-12-05
Payer: MEDICARE

## 2024-12-05 VITALS
SYSTOLIC BLOOD PRESSURE: 156 MMHG | OXYGEN SATURATION: 97 % | WEIGHT: 106 LBS | HEIGHT: 62 IN | HEART RATE: 94 BPM | TEMPERATURE: 97.4 F | DIASTOLIC BLOOD PRESSURE: 68 MMHG | RESPIRATION RATE: 24 BRPM | BODY MASS INDEX: 19.51 KG/M2

## 2024-12-05 DIAGNOSIS — C25.0 MALIGNANT TUMOR OF HEAD OF PANCREAS: Primary | ICD-10-CM

## 2024-12-05 DIAGNOSIS — Z45.2 ENCOUNTER FOR CARE RELATED TO PORT-A-CATH: ICD-10-CM

## 2024-12-05 PROCEDURE — 96417 CHEMO IV INFUS EACH ADDL SEQ: CPT

## 2024-12-05 PROCEDURE — 96375 TX/PRO/DX INJ NEW DRUG ADDON: CPT

## 2024-12-05 PROCEDURE — 25010000002 DEXAMETHASONE SODIUM PHOSPHATE 100 MG/10ML SOLUTION: Performed by: INTERNAL MEDICINE

## 2024-12-05 PROCEDURE — 25010000002 GEMCITABINE HCL 2 GM/20ML SOLUTION 20 ML VIAL: Performed by: INTERNAL MEDICINE

## 2024-12-05 PROCEDURE — 25810000003 SODIUM CHLORIDE 0.9 % SOLUTION 250 ML FLEX CONT: Performed by: INTERNAL MEDICINE

## 2024-12-05 PROCEDURE — 25010000002 HEPARIN LOCK FLUSH PER 10 UNITS: Performed by: INTERNAL MEDICINE

## 2024-12-05 PROCEDURE — 96413 CHEMO IV INFUSION 1 HR: CPT

## 2024-12-05 PROCEDURE — 25010000002 PACLITAXEL PROTEIN-BOUND PART PER 1 MG: Performed by: INTERNAL MEDICINE

## 2024-12-05 PROCEDURE — 1126F AMNT PAIN NOTED NONE PRSNT: CPT | Performed by: INTERNAL MEDICINE

## 2024-12-05 PROCEDURE — 99214 OFFICE O/P EST MOD 30 MIN: CPT | Performed by: INTERNAL MEDICINE

## 2024-12-05 PROCEDURE — 25810000003 SODIUM CHLORIDE 0.9 % SOLUTION: Performed by: INTERNAL MEDICINE

## 2024-12-05 RX ORDER — PACLITAXEL 100 MG/20ML
200 INJECTION, POWDER, LYOPHILIZED, FOR SUSPENSION INTRAVENOUS ONCE
Status: COMPLETED | OUTPATIENT
Start: 2024-12-05 | End: 2024-12-05

## 2024-12-05 RX ORDER — HEPARIN SODIUM (PORCINE) LOCK FLUSH IV SOLN 100 UNIT/ML 100 UNIT/ML
500 SOLUTION INTRAVENOUS AS NEEDED
Status: DISCONTINUED | OUTPATIENT
Start: 2024-12-05 | End: 2024-12-06 | Stop reason: HOSPADM

## 2024-12-05 RX ORDER — SODIUM CHLORIDE 9 MG/ML
20 INJECTION, SOLUTION INTRAVENOUS ONCE
Status: COMPLETED | OUTPATIENT
Start: 2024-12-05 | End: 2024-12-05

## 2024-12-05 RX ORDER — PACLITAXEL 100 MG/20ML
125 INJECTION, POWDER, LYOPHILIZED, FOR SUSPENSION INTRAVENOUS ONCE
OUTPATIENT
Start: 2025-01-16

## 2024-12-05 RX ORDER — HEPARIN SODIUM (PORCINE) LOCK FLUSH IV SOLN 100 UNIT/ML 100 UNIT/ML
500 SOLUTION INTRAVENOUS AS NEEDED
OUTPATIENT
Start: 2024-12-05

## 2024-12-05 RX ORDER — SODIUM CHLORIDE 0.9 % (FLUSH) 0.9 %
20 SYRINGE (ML) INJECTION AS NEEDED
OUTPATIENT
Start: 2024-12-05

## 2024-12-05 RX ORDER — PACLITAXEL 100 MG/20ML
125 INJECTION, POWDER, LYOPHILIZED, FOR SUSPENSION INTRAVENOUS ONCE
OUTPATIENT
Start: 2025-01-02

## 2024-12-05 RX ORDER — SODIUM CHLORIDE 9 MG/ML
20 INJECTION, SOLUTION INTRAVENOUS ONCE
OUTPATIENT
Start: 2025-01-16

## 2024-12-05 RX ORDER — SODIUM CHLORIDE 9 MG/ML
20 INJECTION, SOLUTION INTRAVENOUS ONCE
OUTPATIENT
Start: 2025-01-02

## 2024-12-05 RX ORDER — SODIUM CHLORIDE 0.9 % (FLUSH) 0.9 %
10 SYRINGE (ML) INJECTION AS NEEDED
OUTPATIENT
Start: 2024-12-05

## 2024-12-05 RX ADMIN — SODIUM CHLORIDE 1500 MG: 9 INJECTION, SOLUTION INTRAVENOUS at 11:50

## 2024-12-05 RX ADMIN — SODIUM CHLORIDE 20 ML/HR: 9 INJECTION, SOLUTION INTRAVENOUS at 10:45

## 2024-12-05 RX ADMIN — DEXAMETHASONE SODIUM PHOSPHATE 12 MG: 100 INJECTION INTRAMUSCULAR; INTRAVENOUS at 10:47

## 2024-12-05 RX ADMIN — HEPARIN 500 UNITS: 100 SYRINGE at 12:26

## 2024-12-05 RX ADMIN — PACLITAXEL 200 MG: 100 INJECTION, POWDER, LYOPHILIZED, FOR SUSPENSION INTRAVENOUS at 11:15

## 2024-12-05 NOTE — PROGRESS NOTES
PROBLEM LIST:  Oncology/Hematology History   Malignant tumor of head of pancreas   10/2/2024 Cancer Staged    Staging form: Exocrine Pancreas, AJCC 8th Edition  - Clinical stage from 10/2/2024: Stage IV (cT2, cN0, cM1) - Signed by Ni Jackson MD on 10/4/2024     10/3/2024 Initial Diagnosis    Malignant tumor of head of pancreas     10/10/2024 -  Chemotherapy    OP PANCREATIC PACLitaxel Protein-Bound / Gemcitabine         REASON FOR VISIT: Pancreatic cancer    HISTORY OF PRESENT ILLNESS:   74 y.o.  female presents today for follow-up of her pancreatic cancer.  She is undergoing chemotherapy with Abraxane and gemcitabine.  She received 2 doses.  Seems tolerated it reasonably well.    Her weight has somewhat stabilized.  Having trouble with heartburn. She does have some palable area in the lower sternal region.    Past medical history, social history and family history was reviewed 12/05/24 and unchanged from prior visit.    Review of Systems:    Review of Systems   Constitutional:  Positive for fatigue.   HENT:  Negative.     Eyes: Negative.    Respiratory: Negative.     Cardiovascular: Negative.    Gastrointestinal: Negative.    Endocrine: Negative.    Genitourinary: Negative.     Musculoskeletal: Negative.    Skin: Negative.    Neurological: Negative.    Hematological: Negative.    Psychiatric/Behavioral: Negative.              Medications:        Current Outpatient Medications:     amLODIPine-benazepril (LOTREL 5-10) 5-10 MG per capsule, Take 1 capsule by mouth Daily., Disp: , Rfl:     levothyroxine (SYNTHROID, LEVOTHROID) 50 MCG tablet, Take 1 tablet by mouth., Disp: , Rfl:     lidocaine-prilocaine (EMLA) 2.5-2.5 % cream, Apply 1 Application topically to the appropriate area as directed As Needed (45-60 minutes prior to port access.  Cover with saran/plastic wrap)., Disp: 30 g, Rfl: 2    naloxone (NARCAN) 4 MG/0.1ML nasal spray, Administer 1 spray into the nostril(s) as directed by provider As Needed for  "Opioid Reversal or Respiratory Depression., Disp: 1 each, Rfl: 0    ondansetron (ZOFRAN) 8 MG tablet, Take 1 tablet by mouth 3 (Three) Times a Day As Needed for Nausea or Vomiting., Disp: 30 tablet, Rfl: 5    ondansetron ODT (ZOFRAN-ODT) 8 MG disintegrating tablet, Place 1 tablet on the tongue Every 8 (Eight) Hours As Needed for Nausea or Vomiting., Disp: 30 tablet, Rfl: 5    [START ON 12/6/2024] oxyCODONE (ROXICODONE) 10 MG tablet, Take 1 tablet by mouth Every 6 (Six) Hours As Needed for Moderate Pain for up to 30 days., Disp: 120 tablet, Rfl: 0    rosuvastatin (CRESTOR) 5 MG tablet, Take 1 tablet by mouth Daily., Disp: , Rfl:   No current facility-administered medications for this visit.    Facility-Administered Medications Ordered in Other Visits:     heparin injection 500 Units, 500 Units, Intravenous, PRN, Ni Jackson MD, 500 Units at 12/05/24 1226    Pain Medications               oxyCODONE (ROXICODONE) 10 MG tablet Starting on 12/6/2024. Take 1 tablet by mouth Every 6 (Six) Hours As Needed for Moderate Pain for up to 30 days.               ALLERGIES:  No Known Allergies      Physical Exam    VITAL SIGNS:  /68 Comment: LUE  Pulse 94   Temp 97.4 °F (36.3 °C) (Temporal)   Resp 24   Ht 157.5 cm (62\")   Wt 48.1 kg (106 lb)   SpO2 97% Comment: RA  BMI 19.39 kg/m²     ECOG score: 1           Wt Readings from Last 3 Encounters:   12/05/24 48.1 kg (106 lb)   12/03/24 48.1 kg (106 lb)   11/21/24 48.5 kg (107 lb)       Body mass index is 19.39 kg/m². Body surface area is 1.46 meters squared.       Performance Status: 1    General: well appearing, in no acute distress  HEENT: sclera anicteric, neck is supple  Lymphatics: no cervical, supraclavicular, or axillary adenopathy  Cardiovascular: regular rate and rhythm, no murmurs, rubs or gallops  Lungs: clear to auscultation bilaterally  Abdomen: soft, nontender, nondistended.  No palpable organomegaly  Extremities: no lower extremity edema  Skin: no " rashes, lesions, bruising, or petechiae  Msk:  Shows no weakness of the large muscle groups  Psych: Mood is stable        RECENT LABS:    Lab Results   Component Value Date    HGB 10.1 (L) 12/03/2024    HCT 31.1 (L) 12/03/2024    MCV 96.3 12/03/2024     12/03/2024    WBC 7.73 12/03/2024    NEUTROABS 5.33 12/03/2024    LYMPHSABS 0.97 12/03/2024    MONOSABS 1.13 (H) 12/03/2024    EOSABS 0.24 12/03/2024    BASOSABS 0.04 12/03/2024       Lab Results   Component Value Date    GLUCOSE 131 (H) 12/03/2024    BUN 9 12/03/2024    CREATININE 0.41 (L) 12/03/2024     (L) 12/03/2024    K 4.0 12/03/2024    CL 95 (L) 12/03/2024    CO2 23.0 12/03/2024    CALCIUM 8.7 12/03/2024    PROTEINTOT 6.4 12/03/2024    ALBUMIN 3.4 (L) 12/03/2024    BILITOT 0.5 12/03/2024    ALKPHOS 361 (H) 12/03/2024    AST 18 12/03/2024    ALT 14 12/03/2024     Lab Results   Component Value Date     5,709.0 (H) 12/03/2024     13,030.0 (H) 11/05/2024     36,750.0 (H) 10/08/2024       XR Chest 1 View    Result Date: 10/7/2024  Impression: No acute cardiopulmonary findings. Electronically Signed: Merrill Duncan MD  10/7/2024 10:43 AM EDT  Workstation ID: [id number]    Mammo Screening Digital Tomosynthesis Bilateral With CAD    Result Date: 10/1/2024  FINAL IMPRESSION: ACR BI-RADS 2: Benign findings. RECOMMENDATIONS: Routine annual screening mammography. A letter including results and recommendations was sent to the patient. Density notification was provided to all patients. Patient information entered into a reminder system with a target due date for the next mammogram. At our facility, a [place] marker is positioned over a visible skin lesion and a linear marker is used to indicate a scar. A triangular marker is placed on a self reported palpable finding. Note: Mammography does not detect approximately 10-15% of breast cancers. An annual clinical breast exam by the patient's breast care physician and regular monthly self breast exams by  the patient are integral parts of breast cancer screening, in addition to annual mammography. A normal mammogram does not completely exclude the presence of breast cancer, especially if there is an abnormal finding on physical exam. When clinically indicated, a biopsy should not be deferred because of a normal mammogram report. cc:     MRI Abdomen With & Without Contrast    Result Date: 9/25/2024  Impression: Pancreatic head mass measuring around 3 cm, likely adenocarcinoma. There is resultant pancreaticobiliary ductal obstruction as well as portosplenomesenteric venous occlusion. Innumerable hepatic metastases. Suspected omental nodularity and mesenteric lymphadenopathy, which may represent disease involvement. Right lower lobe pulmonary nodules. Electronically Signed: Merrill Duncan MD  9/25/2024 11:59 PM EDT  Workstation ID: [id number]    CT Abdomen Pelvis Without Contrast    Result Date: 9/24/2024  Impression: 1.There appears to be irregular enlargement of the pancreatic head concerning for pancreatic neoplasm with mild pancreatic duct dilation. MRI abdomen with and without contrast recommended. 2.Nonspecific central mesenteric edema with few anterior omental vague soft tissue density nodules which may represent lymph nodes. Omental metastatic implants not excluded 3.Slightly larger right lower lobe pulmonary nodules. Metastatic disease is a consideration. Electronically Signed: Kalen Murray MD  9/24/2024 3:39 PM EDT  Workstation ID: [id number]    CT Chest Low Dose Cancer Screening WO    Result Date: 8/14/2024  Impression: 1. There are several new subcentimeter subpleural noncalcified nodules in the right lower lobe and one in the right upper lobe measuring up to 5 mm. These are favored to be infectious or inflammatory in etiology but close follow-up is recommended. These are below the resolution for PET. A follow-up chest CT in 6 months is recommended. 2.Mild emphysema with stable biapical pleural thickening and  evidence of prior granulomatous disease are unchanged. 3. Recommendation: 6 month follow up with LDCT Lung Rads Assessment: Lung-RADS L3 - Probably benign, 1-2% chance of malignancy. Electronically Signed: Puneet DO Hieu  8/14/2024 10:16 PM EDT  Workstation ID: [id number]          Assessment/Plan    1.  Stage IV adenocarcinoma of the pancreas.  we will continue Abraxane and gemcitabine every other week.  Her tumor markers are improving.  Plan to CAT scan her in January.  No changes for now.      2.  Heartburn secondary to chemotherapy.  on PPI and baking soda with water.  If this does not help her she is likely having bile reflux and will need some form of acidic source to neutralize it.    3.  Constipation.  Significant issues with constipation gave her a good bowel regimen including MiraLAX twice a day along with stool softener.        Total time of patient care on day of service including time prior to, face to face with patient, and following visit spent in reviewing records, lab results, imaging studies, discussion with patient, and documentation/charting was > 33 minutes.     Ni Jackson MD  [facility] Hematology and Oncology    Return on: 01/02/25  Return in (Approximately): 1 month, Schedule with next infusion    Orders Placed This Encounter   Procedures    CT Abdomen Pelvis With & Without Contrast    Cancer Antigen 19-9    Comprehensive metabolic panel    Comprehensive metabolic panel    CBC and Differential    CBC and Differential       12/5/2024     Future Appointments         Provider Department Center    12/17/2024 2:00 PM Michelle Arroyo PA-C [facility]    12/17/2024 3:00 PM ACCESS AND LAB DRAW CHAIR 1 [facility] OUTPATIENT ONCOLOGY TC    12/19/2024 11:30 AM CHAIR 12 [facility] OUTPATIENT ONCOLOGY TC    12/27/2024 10:45 AM TC [facility] 1 [facility] CT AT [facility]    12/31/2024 2:30 PM ACCESS  AND LAB DRAW CHAIR 1 [facility] OUTPATIENT ONCOLOGY TC    1/2/2025 9:30 AM (Arrive by 9:15 AM) Ni Jackson MD [facility] HEMATOLOGY & ONCOLOGY TC    1/2/2025 10:00 AM CHAIR 12 [facility] OUTPATIENT ONCOLOGY TC

## 2024-12-17 ENCOUNTER — OFFICE VISIT (OUTPATIENT)
Dept: PALLIATIVE CARE | Facility: CLINIC | Age: 74
End: 2024-12-17
Payer: MEDICARE

## 2024-12-17 ENCOUNTER — HOSPITAL ENCOUNTER (OUTPATIENT)
Dept: ONCOLOGY | Facility: HOSPITAL | Age: 74
Discharge: HOME | End: 2024-12-17
Admitting: INTERNAL MEDICINE
Payer: MEDICARE

## 2024-12-17 VITALS
OXYGEN SATURATION: 98 % | WEIGHT: 103.3 LBS | HEART RATE: 84 BPM | BODY MASS INDEX: 18.89 KG/M2 | TEMPERATURE: 97 F | DIASTOLIC BLOOD PRESSURE: 68 MMHG | SYSTOLIC BLOOD PRESSURE: 124 MMHG | RESPIRATION RATE: 18 BRPM

## 2024-12-17 DIAGNOSIS — C25.0 MALIGNANT TUMOR OF HEAD OF PANCREAS: ICD-10-CM

## 2024-12-17 DIAGNOSIS — Z45.2 ENCOUNTER FOR CARE RELATED TO PORT-A-CATH: Primary | ICD-10-CM

## 2024-12-17 DIAGNOSIS — C25.0 MALIGNANT TUMOR OF HEAD OF PANCREAS: Primary | ICD-10-CM

## 2024-12-17 DIAGNOSIS — R63.0 ANOREXIA: ICD-10-CM

## 2024-12-17 DIAGNOSIS — G89.3 CANCER RELATED PAIN: ICD-10-CM

## 2024-12-17 LAB
ALBUMIN SERPL-MCNC: 3.5 G/DL (ref 3.5–5.2)
ALBUMIN/GLOB SERPL: 1.2 G/DL
ALP SERPL-CCNC: 331 U/L (ref 39–117)
ALT SERPL W P-5'-P-CCNC: 12 U/L (ref 1–33)
ANION GAP SERPL CALCULATED.3IONS-SCNC: 10 MMOL/L (ref 5–15)
AST SERPL-CCNC: 19 U/L (ref 1–32)
BASOPHILS # BLD AUTO: 0.03 10*3/MM3 (ref 0–0.2)
BASOPHILS NFR BLD AUTO: 0.4 % (ref 0–1.5)
BILIRUB SERPL-MCNC: 0.4 MG/DL (ref 0–1.2)
BUN SERPL-MCNC: 10 MG/DL (ref 8–23)
BUN/CREAT SERPL: 23.3 (ref 7–25)
CALCIUM SPEC-SCNC: 8.8 MG/DL (ref 8.6–10.5)
CHLORIDE SERPL-SCNC: 98 MMOL/L (ref 98–107)
CO2 SERPL-SCNC: 22 MMOL/L (ref 22–29)
CREAT SERPL-MCNC: 0.43 MG/DL (ref 0.57–1)
DEPRECATED RDW RBC AUTO: 46.7 FL (ref 37–54)
EGFRCR SERPLBLD CKD-EPI 2021: 100.9 ML/MIN/1.73
EOSINOPHIL # BLD AUTO: 0.17 10*3/MM3 (ref 0–0.4)
EOSINOPHIL NFR BLD AUTO: 2.4 % (ref 0.3–6.2)
ERYTHROCYTE [DISTWIDTH] IN BLOOD BY AUTOMATED COUNT: 14 % (ref 12.3–15.4)
GLOBULIN UR ELPH-MCNC: 3 GM/DL
GLUCOSE SERPL-MCNC: 107 MG/DL (ref 65–99)
HCT VFR BLD AUTO: 29 % (ref 34–46.6)
HGB BLD-MCNC: 9.5 G/DL (ref 12–15.9)
IMM GRANULOCYTES # BLD AUTO: 0.02 10*3/MM3 (ref 0–0.05)
IMM GRANULOCYTES NFR BLD AUTO: 0.3 % (ref 0–0.5)
LYMPHOCYTES # BLD AUTO: 0.89 10*3/MM3 (ref 0.7–3.1)
LYMPHOCYTES NFR BLD AUTO: 12.6 % (ref 19.6–45.3)
MCH RBC QN AUTO: 30.4 PG (ref 26.6–33)
MCHC RBC AUTO-ENTMCNC: 32.8 G/DL (ref 31.5–35.7)
MCV RBC AUTO: 92.7 FL (ref 79–97)
MONOCYTES # BLD AUTO: 1.08 10*3/MM3 (ref 0.1–0.9)
MONOCYTES NFR BLD AUTO: 15.3 % (ref 5–12)
NEUTROPHILS NFR BLD AUTO: 4.86 10*3/MM3 (ref 1.7–7)
NEUTROPHILS NFR BLD AUTO: 69 % (ref 42.7–76)
PLATELET # BLD AUTO: 257 10*3/MM3 (ref 140–450)
PMV BLD AUTO: 9.8 FL (ref 6–12)
POTASSIUM SERPL-SCNC: 4.2 MMOL/L (ref 3.5–5.2)
PROT SERPL-MCNC: 6.5 G/DL (ref 6–8.5)
RBC # BLD AUTO: 3.13 10*6/MM3 (ref 3.77–5.28)
SODIUM SERPL-SCNC: 130 MMOL/L (ref 136–145)
WBC NRBC COR # BLD AUTO: 7.05 10*3/MM3 (ref 3.4–10.8)

## 2024-12-17 PROCEDURE — 36591 DRAW BLOOD OFF VENOUS DEVICE: CPT

## 2024-12-17 PROCEDURE — 1159F MED LIST DOCD IN RCRD: CPT | Performed by: PHYSICIAN ASSISTANT

## 2024-12-17 PROCEDURE — 25010000002 HEPARIN LOCK FLUSH PER 10 UNITS: Performed by: INTERNAL MEDICINE

## 2024-12-17 PROCEDURE — 1160F RVW MEDS BY RX/DR IN RCRD: CPT | Performed by: PHYSICIAN ASSISTANT

## 2024-12-17 PROCEDURE — 1125F AMNT PAIN NOTED PAIN PRSNT: CPT | Performed by: PHYSICIAN ASSISTANT

## 2024-12-17 PROCEDURE — 99215 OFFICE O/P EST HI 40 MIN: CPT | Performed by: PHYSICIAN ASSISTANT

## 2024-12-17 PROCEDURE — 85025 COMPLETE CBC W/AUTO DIFF WBC: CPT | Performed by: INTERNAL MEDICINE

## 2024-12-17 PROCEDURE — 80053 COMPREHEN METABOLIC PANEL: CPT | Performed by: INTERNAL MEDICINE

## 2024-12-17 RX ORDER — HEPARIN SODIUM (PORCINE) LOCK FLUSH IV SOLN 100 UNIT/ML 100 UNIT/ML
500 SOLUTION INTRAVENOUS AS NEEDED
Status: CANCELLED | OUTPATIENT
Start: 2024-12-17

## 2024-12-17 RX ORDER — HEPARIN SODIUM (PORCINE) LOCK FLUSH IV SOLN 100 UNIT/ML 100 UNIT/ML
500 SOLUTION INTRAVENOUS AS NEEDED
Status: DISCONTINUED | OUTPATIENT
Start: 2024-12-17 | End: 2024-12-18 | Stop reason: HOSPADM

## 2024-12-17 RX ORDER — MEGESTROL ACETATE 40 MG/ML
800 SUSPENSION ORAL DAILY
Qty: 480 ML | Refills: 1 | Status: SHIPPED | OUTPATIENT
Start: 2024-12-17

## 2024-12-17 RX ORDER — SODIUM CHLORIDE 0.9 % (FLUSH) 0.9 %
20 SYRINGE (ML) INJECTION AS NEEDED
Status: CANCELLED | OUTPATIENT
Start: 2024-12-17

## 2024-12-17 RX ORDER — SODIUM CHLORIDE 0.9 % (FLUSH) 0.9 %
10 SYRINGE (ML) INJECTION AS NEEDED
Status: CANCELLED | OUTPATIENT
Start: 2024-12-17

## 2024-12-17 RX ADMIN — Medication 500 UNITS: at 14:43

## 2024-12-17 NOTE — PROGRESS NOTES
Palliative Clinic Note      Name: Claudia Flores  Age: 74 y.o.  Sex: female  : 1950  MRN: [id number]  Date of Service: 2024   Medical Oncologist: Dr. Jackson     Subjective:    Chief Complaint: Lack of appetite    History of Present Illness: Claudia Flores is a 74 y.o. female with past medical history significant for pancreatic cancer, hypertension, hypothyroidism, hyperlipidemia who presents to the palliative clinic today as a follow up for pain and symptom management.     Treatment summary: The patient presented with unintentional weight loss. Imaging revealed a pancreatic head mass along with findings suspicious for metastatic disease in the lung and liver. Patient started chemotherapy with gemcitabine with Abraxane in 10/2024. Next scans will be due in 2025.     Pain: The patient c/o abdominal pain controlled with oxycodone 10 mg q6h PRN. She denies significant side effects from the opioid therapy but admits to some constipation. Additionally, the patient complains of a bulge in her abdomen that is sore.      Other symptoms: The patient complains of lack of appetite, change in taste and weight loss. She is trying to increase protein in her diet. The patient c/o fatigue worse for 2-3 days after chemotherapy. The patient often takes a nap in the afternoon. She sleeps well most nights. The patient denies nausea or vomiting. She manages opioid induced constipation with stool softener and Miralax as needed. She complains of diarrhea for 2-3 days after chemotherapy.     Psychosocial: The patient lives with her . They have 1 daughter and 2 grand children that live nearby. The patient is a retired teacher. She enjoys traveling. The is a member at [employer]. No allergies. She admits to smoking 1/2 PPD. She reports rare alcohol use and no illicit drug use. No history of mental illness. Patient denies anxiety or depression.      Spiritual: Restoration.      Goals: Maximize comfort,  optimize function & psychosocial wellbeing, and promote advanced care planning.    The following portions of the patient's history were reviewed and updated as appropriate: allergies, current medications, past family history, past medical history, past social history, past surgical history and problem list.    ORT-R: Low risk   Decisional capacity: Full  ECOG: (1) Restricted in physically strenuous activity, ambulatory and able to do work of light nature     Objective:    /68   Pulse 84   Temp 97 °F (36.1 °C) (Temporal)   Resp 18   Wt 46.9 kg (103 lb 4.8 oz)   SpO2 98%   BMI 18.89 kg/m²     Constitutional: Awake, alert, normal gait, sitting up in exam chair, in no acute distress  Eyes: PERRLA, EOMS intact  HENT: NCAT, face symmetric  Neck: Supple, trachea midline  Respiratory: Nonlabored respirations  Cardiovascular: RRR, no edema observed  Gastrointestinal: Soft, no guarding  Musculoskeletal: Moves all extremities   Psychiatric: Appropriate affect, cooperative  Neurologic: Oriented x 3, Cranial Nerves grossly intact to confrontation, speech clear  Skin: Cool dry, no rashes or wounds appreciated     Medication Counts: Reviewed. See bottom of note for details. Brought medication.  No overuse or misuse evident.  I have reviewed the patient's [place] PDMP. [facility] Req #[id number] .   UDS: Need to complete.    Assessment & Plan:    1. Malignant tumor of head of pancreas  - Patient tolerating treatment with gemcitabine with Abraxane. Next scans will be due in 1/2025.    2. Cancer related pain  - Patient is appropriate for opioid therapy due to cancer related pain. Daily function and quality of life improved with pain medication. Continue oxycodone 10 mg q6h PRN. Side effects of the medication discussed at every visit. Patient was encouraged to continue bowel regimen of daily stool softeners, prn laxatives, and diet modifications.    3. Anorexia  - Persistent lack of appetite and weight loss. Start trail of megestrol  (MEGACE) 40 MG/ML suspension; Take 20 mL by mouth Daily.  Dispense: 480 mL; Refill: 1    Advanced directives: Yes, Living Will     Return in about 3 months (around 3/17/2025) for Office Visit.    I spent 40 minutes caring for Claudia Folres on this date of service. This time includes time spent by me in the following activities: preparing for the visit, reviewing tests, obtaining and/or reviewing a separately obtained history, performing a medically appropriate examination and/or evaluation , counseling and educating the patient/family/caregiver, ordering medications, tests, or procedures, documenting information in the medical record, independently interpreting results and communicating that information with the patient/family/caregiver, and care coordination    Michelle Arroyo PA-C  12/17/2024    Medication Date Filled # Filled Count Used # Days  ISAC   Oxycodone 10 12/6/24 120 110 10 11 1

## 2024-12-18 ENCOUNTER — PATIENT ROUNDING (BHMG ONLY) (OUTPATIENT)
Dept: PALLIATIVE CARE | Facility: CLINIC | Age: 74
End: 2024-12-18
Payer: MEDICARE

## 2024-12-19 ENCOUNTER — HOSPITAL ENCOUNTER (OUTPATIENT)
Dept: ONCOLOGY | Facility: HOSPITAL | Age: 74
Discharge: HOME | End: 2024-12-19
Payer: MEDICARE

## 2024-12-19 VITALS
WEIGHT: 103 LBS | RESPIRATION RATE: 16 BRPM | DIASTOLIC BLOOD PRESSURE: 59 MMHG | HEART RATE: 88 BPM | SYSTOLIC BLOOD PRESSURE: 122 MMHG | BODY MASS INDEX: 18.95 KG/M2 | HEIGHT: 62 IN | TEMPERATURE: 97.4 F

## 2024-12-19 DIAGNOSIS — C25.0 MALIGNANT TUMOR OF HEAD OF PANCREAS: Primary | ICD-10-CM

## 2024-12-19 DIAGNOSIS — Z45.2 ENCOUNTER FOR CARE RELATED TO PORT-A-CATH: ICD-10-CM

## 2024-12-19 PROCEDURE — 96375 TX/PRO/DX INJ NEW DRUG ADDON: CPT

## 2024-12-19 PROCEDURE — 96417 CHEMO IV INFUS EACH ADDL SEQ: CPT

## 2024-12-19 PROCEDURE — 25010000002 PACLITAXEL PROTEIN-BOUND PART PER 1 MG: Performed by: INTERNAL MEDICINE

## 2024-12-19 PROCEDURE — 25810000003 SODIUM CHLORIDE 0.9 % SOLUTION 250 ML FLEX CONT: Performed by: INTERNAL MEDICINE

## 2024-12-19 PROCEDURE — 25010000002 DEXAMETHASONE SODIUM PHOSPHATE 100 MG/10ML SOLUTION: Performed by: INTERNAL MEDICINE

## 2024-12-19 PROCEDURE — 96413 CHEMO IV INFUSION 1 HR: CPT

## 2024-12-19 PROCEDURE — 25010000002 GEMCITABINE HCL 2 GM/20ML SOLUTION 20 ML VIAL: Performed by: INTERNAL MEDICINE

## 2024-12-19 PROCEDURE — 25010000002 HEPARIN LOCK FLUSH PER 10 UNITS: Performed by: INTERNAL MEDICINE

## 2024-12-19 RX ORDER — SODIUM CHLORIDE 9 MG/ML
20 INJECTION, SOLUTION INTRAVENOUS ONCE
Status: COMPLETED | OUTPATIENT
Start: 2024-12-19 | End: 2024-12-19

## 2024-12-19 RX ORDER — SODIUM CHLORIDE 0.9 % (FLUSH) 0.9 %
10 SYRINGE (ML) INJECTION AS NEEDED
OUTPATIENT
Start: 2024-12-19

## 2024-12-19 RX ORDER — PACLITAXEL 100 MG/20ML
125 INJECTION, POWDER, LYOPHILIZED, FOR SUSPENSION INTRAVENOUS ONCE
Status: COMPLETED | OUTPATIENT
Start: 2024-12-19 | End: 2024-12-19

## 2024-12-19 RX ORDER — HEPARIN SODIUM (PORCINE) LOCK FLUSH IV SOLN 100 UNIT/ML 100 UNIT/ML
500 SOLUTION INTRAVENOUS AS NEEDED
OUTPATIENT
Start: 2024-12-19

## 2024-12-19 RX ORDER — HEPARIN SODIUM (PORCINE) LOCK FLUSH IV SOLN 100 UNIT/ML 100 UNIT/ML
500 SOLUTION INTRAVENOUS AS NEEDED
Status: DISCONTINUED | OUTPATIENT
Start: 2024-12-19 | End: 2024-12-20 | Stop reason: HOSPADM

## 2024-12-19 RX ORDER — SODIUM CHLORIDE 0.9 % (FLUSH) 0.9 %
20 SYRINGE (ML) INJECTION AS NEEDED
OUTPATIENT
Start: 2024-12-19

## 2024-12-19 RX ADMIN — PACLITAXEL 190 MG: 100 INJECTION, POWDER, LYOPHILIZED, FOR SUSPENSION INTRAVENOUS at 12:29

## 2024-12-19 RX ADMIN — GEMCITABINE 1500 MG: 100 INJECTION, SOLUTION INTRAVENOUS at 13:22

## 2024-12-19 RX ADMIN — DEXAMETHASONE SODIUM PHOSPHATE 12 MG: 100 INJECTION INTRAMUSCULAR; INTRAVENOUS at 11:45

## 2024-12-19 RX ADMIN — HEPARIN 500 UNITS: 100 SYRINGE at 14:04

## 2024-12-19 RX ADMIN — SODIUM CHLORIDE 20 ML/HR: 9 INJECTION, SOLUTION INTRAVENOUS at 11:43

## 2024-12-27 ENCOUNTER — HOSPITAL ENCOUNTER (OUTPATIENT)
Dept: CT IMAGING | Facility: HOSPITAL | Age: 74
Discharge: HOME | End: 2024-12-27
Payer: MEDICARE

## 2024-12-27 DIAGNOSIS — C25.0 MALIGNANT TUMOR OF HEAD OF PANCREAS: ICD-10-CM

## 2024-12-27 PROCEDURE — 74178 CT ABD&PLV WO CNTR FLWD CNTR: CPT

## 2024-12-27 PROCEDURE — 25510000001 IOPAMIDOL PER 1 ML: Performed by: INTERNAL MEDICINE

## 2024-12-27 RX ORDER — IOPAMIDOL 755 MG/ML
100 INJECTION, SOLUTION INTRAVASCULAR
Status: COMPLETED | OUTPATIENT
Start: 2024-12-27 | End: 2024-12-27

## 2024-12-27 RX ADMIN — IOPAMIDOL 100 ML: 755 INJECTION, SOLUTION INTRAVENOUS at 10:22

## 2024-12-31 ENCOUNTER — HOSPITAL ENCOUNTER (OUTPATIENT)
Dept: ONCOLOGY | Facility: HOSPITAL | Age: 74
Discharge: HOME | End: 2024-12-31
Admitting: INTERNAL MEDICINE
Payer: MEDICARE

## 2024-12-31 VITALS
BODY MASS INDEX: 19.51 KG/M2 | WEIGHT: 106 LBS | RESPIRATION RATE: 16 BRPM | DIASTOLIC BLOOD PRESSURE: 48 MMHG | TEMPERATURE: 97.4 F | HEIGHT: 62 IN | SYSTOLIC BLOOD PRESSURE: 98 MMHG | HEART RATE: 77 BPM

## 2024-12-31 DIAGNOSIS — Z45.2 ENCOUNTER FOR CARE RELATED TO PORT-A-CATH: Primary | ICD-10-CM

## 2024-12-31 DIAGNOSIS — C25.0 MALIGNANT TUMOR OF HEAD OF PANCREAS: ICD-10-CM

## 2024-12-31 LAB
ALBUMIN SERPL-MCNC: 3.6 G/DL (ref 3.5–5.2)
ALBUMIN/GLOB SERPL: 1.3 G/DL
ALP SERPL-CCNC: 224 U/L (ref 39–117)
ALT SERPL W P-5'-P-CCNC: 19 U/L (ref 1–33)
ANION GAP SERPL CALCULATED.3IONS-SCNC: 15 MMOL/L (ref 5–15)
AST SERPL-CCNC: 20 U/L (ref 1–32)
BASOPHILS # BLD AUTO: 0.06 10*3/MM3 (ref 0–0.2)
BASOPHILS NFR BLD AUTO: 0.6 % (ref 0–1.5)
BILIRUB SERPL-MCNC: 0.4 MG/DL (ref 0–1.2)
BUN SERPL-MCNC: 18 MG/DL (ref 8–23)
BUN/CREAT SERPL: 27.3 (ref 7–25)
CALCIUM SPEC-SCNC: 9 MG/DL (ref 8.6–10.5)
CANCER AG19-9 SERPL-ACNC: 3752 U/ML
CHLORIDE SERPL-SCNC: 97 MMOL/L (ref 98–107)
CO2 SERPL-SCNC: 19 MMOL/L (ref 22–29)
CREAT SERPL-MCNC: 0.66 MG/DL (ref 0.57–1)
DEPRECATED RDW RBC AUTO: 51.4 FL (ref 37–54)
EGFRCR SERPLBLD CKD-EPI 2021: 91 ML/MIN/1.73
EOSINOPHIL # BLD AUTO: 0.21 10*3/MM3 (ref 0–0.4)
EOSINOPHIL NFR BLD AUTO: 2.2 % (ref 0.3–6.2)
ERYTHROCYTE [DISTWIDTH] IN BLOOD BY AUTOMATED COUNT: 15.9 % (ref 12.3–15.4)
GLOBULIN UR ELPH-MCNC: 2.8 GM/DL
GLUCOSE SERPL-MCNC: 144 MG/DL (ref 65–99)
HCT VFR BLD AUTO: 28.6 % (ref 34–46.6)
HGB BLD-MCNC: 9.2 G/DL (ref 12–15.9)
IMM GRANULOCYTES # BLD AUTO: 0.07 10*3/MM3 (ref 0–0.05)
IMM GRANULOCYTES NFR BLD AUTO: 0.7 % (ref 0–0.5)
LYMPHOCYTES # BLD AUTO: 0.89 10*3/MM3 (ref 0.7–3.1)
LYMPHOCYTES NFR BLD AUTO: 9.5 % (ref 19.6–45.3)
MCH RBC QN AUTO: 29.3 PG (ref 26.6–33)
MCHC RBC AUTO-ENTMCNC: 32.2 G/DL (ref 31.5–35.7)
MCV RBC AUTO: 91.1 FL (ref 79–97)
MONOCYTES # BLD AUTO: 1.17 10*3/MM3 (ref 0.1–0.9)
MONOCYTES NFR BLD AUTO: 12.5 % (ref 5–12)
NEUTROPHILS NFR BLD AUTO: 6.98 10*3/MM3 (ref 1.7–7)
NEUTROPHILS NFR BLD AUTO: 74.5 % (ref 42.7–76)
NRBC BLD AUTO-RTO: 0 /100 WBC (ref 0–0.2)
PLATELET # BLD AUTO: 272 10*3/MM3 (ref 140–450)
PMV BLD AUTO: 9.8 FL (ref 6–12)
POTASSIUM SERPL-SCNC: 4.4 MMOL/L (ref 3.5–5.2)
PROT SERPL-MCNC: 6.4 G/DL (ref 6–8.5)
RBC # BLD AUTO: 3.14 10*6/MM3 (ref 3.77–5.28)
SODIUM SERPL-SCNC: 131 MMOL/L (ref 136–145)
WBC NRBC COR # BLD AUTO: 9.38 10*3/MM3 (ref 3.4–10.8)

## 2024-12-31 PROCEDURE — 85025 COMPLETE CBC W/AUTO DIFF WBC: CPT | Performed by: INTERNAL MEDICINE

## 2024-12-31 PROCEDURE — 36591 DRAW BLOOD OFF VENOUS DEVICE: CPT

## 2024-12-31 PROCEDURE — 86301 IMMUNOASSAY TUMOR CA 19-9: CPT | Performed by: INTERNAL MEDICINE

## 2024-12-31 PROCEDURE — 25010000002 HEPARIN LOCK FLUSH PER 10 UNITS: Performed by: INTERNAL MEDICINE

## 2024-12-31 PROCEDURE — 80053 COMPREHEN METABOLIC PANEL: CPT | Performed by: INTERNAL MEDICINE

## 2024-12-31 RX ORDER — HEPARIN SODIUM (PORCINE) LOCK FLUSH IV SOLN 100 UNIT/ML 100 UNIT/ML
500 SOLUTION INTRAVENOUS AS NEEDED
Status: CANCELLED | OUTPATIENT
Start: 2024-12-31

## 2024-12-31 RX ORDER — SODIUM CHLORIDE 0.9 % (FLUSH) 0.9 %
20 SYRINGE (ML) INJECTION AS NEEDED
Status: CANCELLED | OUTPATIENT
Start: 2024-12-31

## 2024-12-31 RX ORDER — SODIUM CHLORIDE 0.9 % (FLUSH) 0.9 %
10 SYRINGE (ML) INJECTION AS NEEDED
Status: CANCELLED | OUTPATIENT
Start: 2024-12-31

## 2024-12-31 RX ORDER — HEPARIN SODIUM (PORCINE) LOCK FLUSH IV SOLN 100 UNIT/ML 100 UNIT/ML
500 SOLUTION INTRAVENOUS AS NEEDED
Status: DISCONTINUED | OUTPATIENT
Start: 2024-12-31 | End: 2025-01-01 | Stop reason: HOSPADM

## 2024-12-31 RX ADMIN — Medication 500 UNITS: at 14:52

## 2025-01-02 ENCOUNTER — OFFICE VISIT (OUTPATIENT)
Dept: ONCOLOGY | Facility: CLINIC | Age: 75
End: 2025-01-02
Payer: MEDICARE

## 2025-01-02 ENCOUNTER — PATIENT OUTREACH (OUTPATIENT)
Dept: OTHER | Facility: HOSPITAL | Age: 75
End: 2025-01-02
Payer: MEDICARE

## 2025-01-02 ENCOUNTER — HOSPITAL ENCOUNTER (OUTPATIENT)
Dept: ONCOLOGY | Facility: HOSPITAL | Age: 75
Discharge: HOME | End: 2025-01-02
Admitting: INTERNAL MEDICINE
Payer: MEDICARE

## 2025-01-02 ENCOUNTER — DOCUMENTATION (OUTPATIENT)
Dept: OTHER | Facility: HOSPITAL | Age: 75
End: 2025-01-02
Payer: MEDICARE

## 2025-01-02 VITALS
OXYGEN SATURATION: 99 % | TEMPERATURE: 97.4 F | HEIGHT: 62 IN | DIASTOLIC BLOOD PRESSURE: 69 MMHG | WEIGHT: 106 LBS | BODY MASS INDEX: 19.51 KG/M2 | HEART RATE: 80 BPM | RESPIRATION RATE: 20 BRPM | SYSTOLIC BLOOD PRESSURE: 139 MMHG

## 2025-01-02 DIAGNOSIS — C25.0 MALIGNANT TUMOR OF HEAD OF PANCREAS: Primary | ICD-10-CM

## 2025-01-02 DIAGNOSIS — Z45.2 ENCOUNTER FOR CARE RELATED TO PORT-A-CATH: ICD-10-CM

## 2025-01-02 PROCEDURE — 25810000003 SODIUM CHLORIDE 0.9 % SOLUTION 250 ML FLEX CONT: Performed by: INTERNAL MEDICINE

## 2025-01-02 PROCEDURE — 25010000002 PACLITAXEL PROTEIN-BOUND PART PER 1 MG: Performed by: INTERNAL MEDICINE

## 2025-01-02 PROCEDURE — 96417 CHEMO IV INFUS EACH ADDL SEQ: CPT

## 2025-01-02 PROCEDURE — 99214 OFFICE O/P EST MOD 30 MIN: CPT | Performed by: INTERNAL MEDICINE

## 2025-01-02 PROCEDURE — 96413 CHEMO IV INFUSION 1 HR: CPT

## 2025-01-02 PROCEDURE — 1126F AMNT PAIN NOTED NONE PRSNT: CPT | Performed by: INTERNAL MEDICINE

## 2025-01-02 PROCEDURE — 96375 TX/PRO/DX INJ NEW DRUG ADDON: CPT

## 2025-01-02 PROCEDURE — 25010000002 DEXAMETHASONE SODIUM PHOSPHATE 100 MG/10ML SOLUTION: Performed by: INTERNAL MEDICINE

## 2025-01-02 PROCEDURE — 25010000002 HEPARIN LOCK FLUSH PER 10 UNITS: Performed by: INTERNAL MEDICINE

## 2025-01-02 PROCEDURE — 25010000002 GEMCITABINE HCL 2 GM/20ML SOLUTION 20 ML VIAL: Performed by: INTERNAL MEDICINE

## 2025-01-02 RX ORDER — SODIUM CHLORIDE 0.9 % (FLUSH) 0.9 %
10 SYRINGE (ML) INJECTION AS NEEDED
OUTPATIENT
Start: 2025-01-02

## 2025-01-02 RX ORDER — PACLITAXEL 100 MG/20ML
125 INJECTION, POWDER, LYOPHILIZED, FOR SUSPENSION INTRAVENOUS ONCE
OUTPATIENT
Start: 2025-01-30

## 2025-01-02 RX ORDER — SODIUM CHLORIDE 0.9 % (FLUSH) 0.9 %
10 SYRINGE (ML) INJECTION AS NEEDED
Status: DISCONTINUED | OUTPATIENT
Start: 2025-01-02 | End: 2025-01-03 | Stop reason: HOSPADM

## 2025-01-02 RX ORDER — SODIUM CHLORIDE 0.9 % (FLUSH) 0.9 %
20 SYRINGE (ML) INJECTION AS NEEDED
Status: DISCONTINUED | OUTPATIENT
Start: 2025-01-02 | End: 2025-01-03 | Stop reason: HOSPADM

## 2025-01-02 RX ORDER — PACLITAXEL 100 MG/20ML
125 INJECTION, POWDER, LYOPHILIZED, FOR SUSPENSION INTRAVENOUS ONCE
OUTPATIENT
Start: 2025-02-13

## 2025-01-02 RX ORDER — HEPARIN SODIUM (PORCINE) LOCK FLUSH IV SOLN 100 UNIT/ML 100 UNIT/ML
500 SOLUTION INTRAVENOUS AS NEEDED
OUTPATIENT
Start: 2025-01-02

## 2025-01-02 RX ORDER — SODIUM CHLORIDE 0.9 % (FLUSH) 0.9 %
20 SYRINGE (ML) INJECTION AS NEEDED
OUTPATIENT
Start: 2025-01-02

## 2025-01-02 RX ORDER — PACLITAXEL 100 MG/20ML
200 INJECTION, POWDER, LYOPHILIZED, FOR SUSPENSION INTRAVENOUS ONCE
Status: COMPLETED | OUTPATIENT
Start: 2025-01-02 | End: 2025-01-02

## 2025-01-02 RX ORDER — SODIUM CHLORIDE 9 MG/ML
20 INJECTION, SOLUTION INTRAVENOUS ONCE
OUTPATIENT
Start: 2025-02-13

## 2025-01-02 RX ORDER — SODIUM CHLORIDE 9 MG/ML
20 INJECTION, SOLUTION INTRAVENOUS ONCE
OUTPATIENT
Start: 2025-01-30

## 2025-01-02 RX ORDER — HEPARIN SODIUM (PORCINE) LOCK FLUSH IV SOLN 100 UNIT/ML 100 UNIT/ML
500 SOLUTION INTRAVENOUS AS NEEDED
Status: DISCONTINUED | OUTPATIENT
Start: 2025-01-02 | End: 2025-01-03 | Stop reason: HOSPADM

## 2025-01-02 RX ORDER — SODIUM CHLORIDE 9 MG/ML
20 INJECTION, SOLUTION INTRAVENOUS ONCE
Status: COMPLETED | OUTPATIENT
Start: 2025-01-02 | End: 2025-01-02

## 2025-01-02 RX ADMIN — PACLITAXEL 200 MG: 100 INJECTION, POWDER, LYOPHILIZED, FOR SUSPENSION INTRAVENOUS at 10:58

## 2025-01-02 RX ADMIN — HEPARIN 500 UNITS: 100 SYRINGE at 12:43

## 2025-01-02 RX ADMIN — SODIUM CHLORIDE 20 ML/HR: 9 INJECTION, SOLUTION INTRAVENOUS at 10:19

## 2025-01-02 RX ADMIN — DEXAMETHASONE SODIUM PHOSPHATE 12 MG: 100 INJECTION INTRAMUSCULAR; INTRAVENOUS at 10:19

## 2025-01-02 RX ADMIN — GEMCITABINE 1500 MG: 100 INJECTION, SOLUTION INTRAVENOUS at 12:01

## 2025-01-02 NOTE — PROGRESS NOTES
SW met with pt in infusion to notarize handicap parking placecard. SW had pt sign on this date, and SW notarized. SW instructed pt to take to the [facility]  to get parking pass. Pt verbalized understanding and had no other needs. SW will be available should future needs arise.

## 2025-01-02 NOTE — PROGRESS NOTES
PROBLEM LIST:  Oncology/Hematology History   Malignant tumor of head of pancreas   10/2/2024 Cancer Staged    Staging form: Exocrine Pancreas, AJCC 8th Edition  - Clinical stage from 10/2/2024: Stage IV (cT2, cN0, cM1) - Signed by Ni Jackson MD on 10/4/2024     10/3/2024 Initial Diagnosis    Malignant tumor of head of pancreas     10/10/2024 -  Chemotherapy    OP PANCREATIC PACLitaxel Protein-Bound / Gemcitabine         REASON FOR VISIT: Pancreatic cancer    HISTORY OF PRESENT ILLNESS:   74 y.o.  female presents today for follow-up of her pancreatic cancer.  She is undergoing chemotherapy with Abraxane and gemcitabine.  She has completed 2 cycles.  She presents after having scans done.  Clinically doing reasonably well.  She is tolerating everything well.   she reports this last month is the best she has felt in a long time.    Past medical history, social history and family history was reviewed 01/02/25 and unchanged from prior visit.    Review of Systems:    Review of Systems   Constitutional:  Positive for fatigue.   HENT:  Negative.     Eyes: Negative.    Respiratory: Negative.     Cardiovascular: Negative.    Gastrointestinal: Negative.    Endocrine: Negative.    Genitourinary: Negative.     Musculoskeletal: Negative.    Skin: Negative.    Neurological: Negative.    Hematological: Negative.    Psychiatric/Behavioral: Negative.              Medications:        Current Outpatient Medications:     amLODIPine-benazepril (LOTREL 5-10) 5-10 MG per capsule, Take 1 capsule by mouth Daily., Disp: , Rfl:     levothyroxine (SYNTHROID, LEVOTHROID) 50 MCG tablet, Take 1 tablet by mouth., Disp: , Rfl:     lidocaine-prilocaine (EMLA) 2.5-2.5 % cream, Apply 1 Application topically to the appropriate area as directed As Needed (45-60 minutes prior to port access.  Cover with saran/plastic wrap)., Disp: 30 g, Rfl: 2    megestrol (MEGACE) 40 MG/ML suspension, Take 20 mL by mouth Daily., Disp: 480 mL, Rfl: 1    naloxone  "(NARCAN) 4 MG/0.1ML nasal spray, Administer 1 spray into the nostril(s) as directed by provider As Needed for Opioid Reversal or Respiratory Depression., Disp: 1 each, Rfl: 0    ondansetron (ZOFRAN) 8 MG tablet, Take 1 tablet by mouth 3 (Three) Times a Day As Needed for Nausea or Vomiting., Disp: 30 tablet, Rfl: 5    ondansetron ODT (ZOFRAN-ODT) 8 MG disintegrating tablet, Place 1 tablet on the tongue Every 8 (Eight) Hours As Needed for Nausea or Vomiting., Disp: 30 tablet, Rfl: 5    oxyCODONE (ROXICODONE) 10 MG tablet, Take 1 tablet by mouth Every 6 (Six) Hours As Needed for Moderate Pain for up to 30 days., Disp: 120 tablet, Rfl: 0    rosuvastatin (CRESTOR) 5 MG tablet, Take 1 tablet by mouth Daily., Disp: , Rfl:   No current facility-administered medications for this visit.    Facility-Administered Medications Ordered in Other Visits:     heparin injection 500 Units, 500 Units, Intravenous, PRN, Ni Jackson MD, 500 Units at 01/02/25 1243    sodium chloride 0.9 % flush 10 mL, 10 mL, Intravenous, PRN, Ni Jackson MD    sodium chloride 0.9 % flush 20 mL, 20 mL, Intravenous, PRN, Ni Jackson MD    Pain Medications               oxyCODONE (ROXICODONE) 10 MG tablet Take 1 tablet by mouth Every 6 (Six) Hours As Needed for Moderate Pain for up to 30 days.               ALLERGIES:  No Known Allergies      Physical Exam    VITAL SIGNS:  /69 Comment: LUE  Pulse 80   Temp 97.4 °F (36.3 °C) (Temporal)   Resp 20   Ht 157.5 cm (62\")   Wt 48.1 kg (106 lb)   SpO2 99% Comment: RA  BMI 19.39 kg/m²     ECOG score: 0           Wt Readings from Last 3 Encounters:   01/02/25 48.1 kg (106 lb)   12/31/24 48.1 kg (106 lb)   12/19/24 46.7 kg (103 lb)       Body mass index is 19.39 kg/m². Body surface area is 1.46 meters squared.       Performance Status: 1    General: well appearing, in no acute distress  HEENT: sclera anicteric, neck is supple  Lymphatics: no cervical, supraclavicular, or " axillary adenopathy  Cardiovascular: regular rate and rhythm, no murmurs, rubs or gallops  Lungs: clear to auscultation bilaterally  Abdomen: soft, nontender, nondistended.  No palpable organomegaly  Extremities: no lower extremity edema  Skin: no rashes, lesions, bruising, or petechiae  Msk:  Shows no weakness of the large muscle groups  Psych: Mood is stable        RECENT LABS:    Lab Results   Component Value Date    HGB 9.2 (L) 12/31/2024    HCT 28.6 (L) 12/31/2024    MCV 91.1 12/31/2024     12/31/2024    WBC 9.38 12/31/2024    NEUTROABS 6.98 12/31/2024    LYMPHSABS 0.89 12/31/2024    MONOSABS 1.17 (H) 12/31/2024    EOSABS 0.21 12/31/2024    BASOSABS 0.06 12/31/2024       Lab Results   Component Value Date    GLUCOSE 144 (H) 12/31/2024    BUN 18 12/31/2024    CREATININE 0.66 12/31/2024     (L) 12/31/2024    K 4.4 12/31/2024    CL 97 (L) 12/31/2024    CO2 19.0 (L) 12/31/2024    CALCIUM 9.0 12/31/2024    PROTEINTOT 6.4 12/31/2024    ALBUMIN 3.6 12/31/2024    BILITOT 0.4 12/31/2024    ALKPHOS 224 (H) 12/31/2024    AST 20 12/31/2024    ALT 19 12/31/2024     Lab Results   Component Value Date     3,752.0 (H) 12/31/2024     5,709.0 (H) 12/03/2024     13,030.0 (H) 11/05/2024     36,750.0 (H) 10/08/2024       CT Abdomen Pelvis With & Without Contrast    Result Date: 12/30/2024  Impression: Primary pancreatic head neoplasm is grossly similar in size. Grossly similar innumerable hepatic metastases, omental nodularity, and mesenteric lymphadenopathy. Slight interval recanalization of the previously occluded portosplenomesenteric venous confluence. New small volume free fluid. Electronically Signed: Merrill Duncan MD  12/30/2024 6:17 PM EST  Workstation ID: [id number]    XR Chest 1 View    Result Date: 10/7/2024  Impression: No acute cardiopulmonary findings. Electronically Signed: Merrill Duncan MD  10/7/2024 10:43 AM EDT  Workstation ID: [id number]    MAMMO SCREENING DIGITAL TOMOSYNTHESIS  BILATERAL W CAD    Result Date: 10/1/2024  FINAL IMPRESSION: ACR BI-RADS 2: Benign findings. RECOMMENDATIONS: Routine annual screening mammography. A letter including results and recommendations was sent to the patient. Density notification was provided to all patients. Patient information entered into a reminder system with a target due date for the next mammogram. At our facility, a [place] marker is positioned over a visible skin lesion and a linear marker is used to indicate a scar. A triangular marker is placed on a self reported palpable finding. Note: Mammography does not detect approximately 10-15% of breast cancers. An annual clinical breast exam by the patient's breast care physician and regular monthly self breast exams by the patient are integral parts of breast cancer screening, in addition to annual mammography. A normal mammogram does not completely exclude the presence of breast cancer, especially if there is an abnormal finding on physical exam. When clinically indicated, a biopsy should not be deferred because of a normal mammogram report. cc:     MRI Abdomen With & Without Contrast    Result Date: 9/25/2024  Impression: Pancreatic head mass measuring around 3 cm, likely adenocarcinoma. There is resultant pancreaticobiliary ductal obstruction as well as portosplenomesenteric venous occlusion. Innumerable hepatic metastases. Suspected omental nodularity and mesenteric lymphadenopathy, which may represent disease involvement. Right lower lobe pulmonary nodules. Electronically Signed: Merrill Duncan MD  9/25/2024 11:59 PM EDT  Workstation ID: [id number]    CT Abdomen Pelvis Without Contrast    Result Date: 9/24/2024  Impression: 1.There appears to be irregular enlargement of the pancreatic head concerning for pancreatic neoplasm with mild pancreatic duct dilation. MRI abdomen with and without contrast recommended. 2.Nonspecific central mesenteric edema with few anterior omental vague soft tissue density  nodules which may represent lymph nodes. Omental metastatic implants not excluded 3.Slightly larger right lower lobe pulmonary nodules. Metastatic disease is a consideration. Electronically Signed: Kalen Murray MD  9/24/2024 3:39 PM EDT  Workstation ID: [id number]      Assessment/Plan    1.  Stage IV adenocarcinoma of the pancreas.  I reviewed her scans which shows stable disease.  But more importantly her tumor markers have improved also.  So I suspect she is having disease response.  Clinically she is doing well.  We will continue Abraxane and gemcitabine every other week.      2.  Heartburn secondary to chemotherapy.  on PPI and baking soda with water.  If this does not help her she is likely having bile reflux and will need some form of acidic source to neutralize it.    3.  Constipation.  Significant issues with constipation gave her a good bowel regimen including MiraLAX twice a day along with stool softener.        Total time of patient care on day of service including time prior to, face to face with patient, and following visit spent in reviewing records, lab results, imaging studies, discussion with patient, and documentation/charting was > 32 minutes.     Ni Jackson MD  [facility] Hematology and Oncology    Return on: 01/30/25  Return in (Approximately): 1 month, Schedule with next infusion    Orders Placed This Encounter   Procedures    Cancer Antigen 19-9    Comprehensive metabolic panel    Comprehensive metabolic panel    CBC and Differential    CBC and Differential       1/2/2025     Future Appointments         Provider Department Center    1/14/2025 2:45 PM ACCESS AND LAB DRAW CHAIR 1 [facility] OUTPATIENT ONCOLOGY TC    1/16/2025 12:30 PM ROOM E [facility] OUTPATIENT ONCOLOGY TC    1/28/2025 2:30 PM ACCESS AND LAB DRAW CHAIR 1 [facility] OUTPATIENT ONCOLOGY TC    1/30/2025 11:00 AM (Arrive by 10:45 AM) Ni Jackson MD [facility]  MEDICAL [facility] HEMATOLOGY & ONCOLOGY TC    1/30/2025 11:30 AM CHAIR 14 [facility] OUTPATIENT ONCOLOGY TC    3/17/2025 2:00 PM Michelle Arroyo PA-C [facility] PALLIATIVE CARE TC

## 2025-01-08 ENCOUNTER — TELEPHONE (OUTPATIENT)
Dept: PALLIATIVE CARE | Facility: CLINIC | Age: 75
End: 2025-01-08
Payer: MEDICARE

## 2025-01-08 DIAGNOSIS — G89.3 CANCER RELATED PAIN: Primary | ICD-10-CM

## 2025-01-08 DIAGNOSIS — C25.0 MALIGNANT NEOPLASM OF HEAD OF PANCREAS: ICD-10-CM

## 2025-01-08 RX ORDER — OXYCODONE HYDROCHLORIDE 10 MG/1
10 TABLET ORAL EVERY 4 HOURS PRN
Qty: 120 TABLET | Refills: 0 | Status: SHIPPED | OUTPATIENT
Start: 2025-01-08 | End: 2025-02-07

## 2025-01-08 NOTE — TELEPHONE ENCOUNTER
LETICIA #: [id number]    Medication requested: oxycodone 10 MG    Last fill date: 12/6/24    Last appointment: 12/17/24    Next appointment: 3/17/25

## 2025-01-14 ENCOUNTER — HOSPITAL ENCOUNTER (OUTPATIENT)
Dept: ONCOLOGY | Facility: HOSPITAL | Age: 75
Discharge: HOME | End: 2025-01-14
Admitting: INTERNAL MEDICINE
Payer: MEDICARE

## 2025-01-14 VITALS
SYSTOLIC BLOOD PRESSURE: 119 MMHG | WEIGHT: 105 LBS | BODY MASS INDEX: 19.32 KG/M2 | HEART RATE: 79 BPM | HEIGHT: 62 IN | DIASTOLIC BLOOD PRESSURE: 60 MMHG | TEMPERATURE: 97.8 F | RESPIRATION RATE: 18 BRPM

## 2025-01-14 DIAGNOSIS — Z45.2 ENCOUNTER FOR CARE RELATED TO PORT-A-CATH: ICD-10-CM

## 2025-01-14 DIAGNOSIS — C25.0 MALIGNANT TUMOR OF HEAD OF PANCREAS: Primary | ICD-10-CM

## 2025-01-14 LAB
ALBUMIN SERPL-MCNC: 3.8 G/DL (ref 3.5–5.2)
ALBUMIN/GLOB SERPL: 1.3 G/DL
ALP SERPL-CCNC: 262 U/L (ref 39–117)
ALT SERPL W P-5'-P-CCNC: 21 U/L (ref 1–33)
ANION GAP SERPL CALCULATED.3IONS-SCNC: 11 MMOL/L (ref 5–15)
AST SERPL-CCNC: 21 U/L (ref 1–32)
BASOPHILS # BLD AUTO: 0.04 10*3/MM3 (ref 0–0.2)
BASOPHILS NFR BLD AUTO: 0.6 % (ref 0–1.5)
BILIRUB SERPL-MCNC: 0.4 MG/DL (ref 0–1.2)
BUN SERPL-MCNC: 18 MG/DL (ref 8–23)
BUN/CREAT SERPL: 29 (ref 7–25)
CALCIUM SPEC-SCNC: 8.9 MG/DL (ref 8.6–10.5)
CHLORIDE SERPL-SCNC: 104 MMOL/L (ref 98–107)
CO2 SERPL-SCNC: 19 MMOL/L (ref 22–29)
CREAT SERPL-MCNC: 0.62 MG/DL (ref 0.57–1)
DEPRECATED RDW RBC AUTO: 59.7 FL (ref 37–54)
EGFRCR SERPLBLD CKD-EPI 2021: 92.4 ML/MIN/1.73
EOSINOPHIL # BLD AUTO: 0.12 10*3/MM3 (ref 0–0.4)
EOSINOPHIL NFR BLD AUTO: 1.8 % (ref 0.3–6.2)
ERYTHROCYTE [DISTWIDTH] IN BLOOD BY AUTOMATED COUNT: 18 % (ref 12.3–15.4)
GLOBULIN UR ELPH-MCNC: 2.9 GM/DL
GLUCOSE SERPL-MCNC: 89 MG/DL (ref 65–99)
HCT VFR BLD AUTO: 28 % (ref 34–46.6)
HGB BLD-MCNC: 9.2 G/DL (ref 12–15.9)
IMM GRANULOCYTES # BLD AUTO: 0.05 10*3/MM3 (ref 0–0.05)
IMM GRANULOCYTES NFR BLD AUTO: 0.7 % (ref 0–0.5)
LYMPHOCYTES # BLD AUTO: 1.08 10*3/MM3 (ref 0.7–3.1)
LYMPHOCYTES NFR BLD AUTO: 16.2 % (ref 19.6–45.3)
MCH RBC QN AUTO: 30.2 PG (ref 26.6–33)
MCHC RBC AUTO-ENTMCNC: 32.9 G/DL (ref 31.5–35.7)
MCV RBC AUTO: 91.8 FL (ref 79–97)
MONOCYTES # BLD AUTO: 1.06 10*3/MM3 (ref 0.1–0.9)
MONOCYTES NFR BLD AUTO: 15.9 % (ref 5–12)
NEUTROPHILS NFR BLD AUTO: 4.33 10*3/MM3 (ref 1.7–7)
NEUTROPHILS NFR BLD AUTO: 64.8 % (ref 42.7–76)
PLATELET # BLD AUTO: 212 10*3/MM3 (ref 140–450)
PMV BLD AUTO: 9 FL (ref 6–12)
POTASSIUM SERPL-SCNC: 4.2 MMOL/L (ref 3.5–5.2)
PROT SERPL-MCNC: 6.7 G/DL (ref 6–8.5)
RBC # BLD AUTO: 3.05 10*6/MM3 (ref 3.77–5.28)
SODIUM SERPL-SCNC: 134 MMOL/L (ref 136–145)
WBC NRBC COR # BLD AUTO: 6.68 10*3/MM3 (ref 3.4–10.8)

## 2025-01-14 PROCEDURE — 80053 COMPREHEN METABOLIC PANEL: CPT | Performed by: INTERNAL MEDICINE

## 2025-01-14 PROCEDURE — 85025 COMPLETE CBC W/AUTO DIFF WBC: CPT | Performed by: INTERNAL MEDICINE

## 2025-01-14 PROCEDURE — 36591 DRAW BLOOD OFF VENOUS DEVICE: CPT

## 2025-01-14 PROCEDURE — 25010000002 HEPARIN LOCK FLUSH PER 10 UNITS: Performed by: INTERNAL MEDICINE

## 2025-01-14 RX ORDER — HEPARIN SODIUM (PORCINE) LOCK FLUSH IV SOLN 100 UNIT/ML 100 UNIT/ML
500 SOLUTION INTRAVENOUS AS NEEDED
Status: CANCELLED | OUTPATIENT
Start: 2025-01-14

## 2025-01-14 RX ORDER — SODIUM CHLORIDE 0.9 % (FLUSH) 0.9 %
10 SYRINGE (ML) INJECTION AS NEEDED
Status: CANCELLED | OUTPATIENT
Start: 2025-01-14

## 2025-01-14 RX ORDER — SODIUM CHLORIDE 0.9 % (FLUSH) 0.9 %
20 SYRINGE (ML) INJECTION AS NEEDED
Status: CANCELLED | OUTPATIENT
Start: 2025-01-14

## 2025-01-14 RX ORDER — HEPARIN SODIUM (PORCINE) LOCK FLUSH IV SOLN 100 UNIT/ML 100 UNIT/ML
500 SOLUTION INTRAVENOUS AS NEEDED
Status: DISCONTINUED | OUTPATIENT
Start: 2025-01-14 | End: 2025-01-15 | Stop reason: HOSPADM

## 2025-01-14 RX ADMIN — Medication 500 UNITS: at 15:00

## 2025-01-16 ENCOUNTER — HOSPITAL ENCOUNTER (OUTPATIENT)
Dept: ONCOLOGY | Facility: HOSPITAL | Age: 75
Discharge: HOME | End: 2025-01-16
Admitting: INTERNAL MEDICINE
Payer: MEDICARE

## 2025-01-16 VITALS
BODY MASS INDEX: 18.95 KG/M2 | HEART RATE: 74 BPM | TEMPERATURE: 97.3 F | WEIGHT: 103 LBS | SYSTOLIC BLOOD PRESSURE: 111 MMHG | HEIGHT: 62 IN | RESPIRATION RATE: 16 BRPM | DIASTOLIC BLOOD PRESSURE: 59 MMHG

## 2025-01-16 DIAGNOSIS — Z45.2 ENCOUNTER FOR CARE RELATED TO PORT-A-CATH: ICD-10-CM

## 2025-01-16 DIAGNOSIS — C25.0 MALIGNANT TUMOR OF HEAD OF PANCREAS: Primary | ICD-10-CM

## 2025-01-16 PROCEDURE — 25010000002 DEXAMETHASONE SODIUM PHOSPHATE 100 MG/10ML SOLUTION: Performed by: INTERNAL MEDICINE

## 2025-01-16 PROCEDURE — 25010000002 PACLITAXEL PROTEIN-BOUND PART PER 1 MG: Performed by: INTERNAL MEDICINE

## 2025-01-16 PROCEDURE — 25010000002 HEPARIN LOCK FLUSH PER 10 UNITS: Performed by: INTERNAL MEDICINE

## 2025-01-16 PROCEDURE — 96375 TX/PRO/DX INJ NEW DRUG ADDON: CPT

## 2025-01-16 PROCEDURE — 25810000003 SODIUM CHLORIDE 0.9 % SOLUTION 250 ML FLEX CONT: Performed by: INTERNAL MEDICINE

## 2025-01-16 PROCEDURE — 25010000002 GEMCITABINE HCL 2 GM/20ML SOLUTION 20 ML VIAL: Performed by: INTERNAL MEDICINE

## 2025-01-16 PROCEDURE — 96413 CHEMO IV INFUSION 1 HR: CPT

## 2025-01-16 PROCEDURE — 96417 CHEMO IV INFUS EACH ADDL SEQ: CPT

## 2025-01-16 RX ORDER — PACLITAXEL 100 MG/20ML
125 INJECTION, POWDER, LYOPHILIZED, FOR SUSPENSION INTRAVENOUS ONCE
Status: COMPLETED | OUTPATIENT
Start: 2025-01-16 | End: 2025-01-16

## 2025-01-16 RX ORDER — SODIUM CHLORIDE 0.9 % (FLUSH) 0.9 %
20 SYRINGE (ML) INJECTION AS NEEDED
OUTPATIENT
Start: 2025-01-16

## 2025-01-16 RX ORDER — SODIUM CHLORIDE 0.9 % (FLUSH) 0.9 %
10 SYRINGE (ML) INJECTION AS NEEDED
OUTPATIENT
Start: 2025-01-16

## 2025-01-16 RX ORDER — HEPARIN SODIUM (PORCINE) LOCK FLUSH IV SOLN 100 UNIT/ML 100 UNIT/ML
500 SOLUTION INTRAVENOUS AS NEEDED
OUTPATIENT
Start: 2025-01-16

## 2025-01-16 RX ORDER — SODIUM CHLORIDE 9 MG/ML
20 INJECTION, SOLUTION INTRAVENOUS ONCE
Status: DISCONTINUED | OUTPATIENT
Start: 2025-01-16 | End: 2025-01-17 | Stop reason: HOSPADM

## 2025-01-16 RX ORDER — HEPARIN SODIUM (PORCINE) LOCK FLUSH IV SOLN 100 UNIT/ML 100 UNIT/ML
500 SOLUTION INTRAVENOUS AS NEEDED
Status: DISCONTINUED | OUTPATIENT
Start: 2025-01-16 | End: 2025-01-17 | Stop reason: HOSPADM

## 2025-01-16 RX ADMIN — HEPARIN 500 UNITS: 100 SYRINGE at 15:10

## 2025-01-16 RX ADMIN — GEMCITABINE 1500 MG: 100 INJECTION, SOLUTION INTRAVENOUS at 14:34

## 2025-01-16 RX ADMIN — DEXAMETHASONE SODIUM PHOSPHATE 12 MG: 100 INJECTION INTRAMUSCULAR; INTRAVENOUS at 13:04

## 2025-01-16 RX ADMIN — PACLITAXEL 190 MG: 100 INJECTION, POWDER, LYOPHILIZED, FOR SUSPENSION INTRAVENOUS at 13:43

## 2025-01-17 ENCOUNTER — TELEPHONE (OUTPATIENT)
Dept: PALLIATIVE CARE | Facility: CLINIC | Age: 75
End: 2025-01-17
Payer: MEDICARE

## 2025-01-17 ENCOUNTER — TELEPHONE (OUTPATIENT)
Dept: ONCOLOGY | Facility: CLINIC | Age: 75
End: 2025-01-17
Payer: MEDICARE

## 2025-01-17 DIAGNOSIS — T45.1X5A CHEMOTHERAPY-INDUCED NEUROPATHY: Primary | ICD-10-CM

## 2025-01-17 DIAGNOSIS — G62.0 CHEMOTHERAPY-INDUCED NEUROPATHY: Primary | ICD-10-CM

## 2025-01-17 RX ORDER — GABAPENTIN 100 MG/1
100 CAPSULE ORAL 3 TIMES DAILY
Qty: 90 CAPSULE | Refills: 0 | Status: SHIPPED | OUTPATIENT
Start: 2025-01-17

## 2025-01-17 NOTE — TELEPHONE ENCOUNTER
Patient left a voicemail she had chemo yesterday and she is having numbness in her fingers. Please call.  
RN returned call to patient. Pt c/o numbness in both hands, it was worse yesterday right after treatment. Pt also called [facility] and Michelle has sent script for gabapentin. Pt will  medication and let us know is she has any further questions or concerns. Dr Ellis notified of side effects.   
[de-identified] : The patient presents with impingement, right shoulder.  The patient was given an injection as noted above.  At this time I recommend ice and elevation.  She will be reassessed in three or four weeks.

## 2025-01-17 NOTE — TELEPHONE ENCOUNTER
PATIENT CALLED AND STATED THAT SHE'S BEEN EXPERIENCING TINGLING AND NUMBNESS IN HER FINGERS POST CHEMO. PATIENT STATED THAT SHE THOUGHT IT WAS HER FINGERS WERE JUST COLD WITH THE WEATHER BUT NOTICED WHILE NOT IN THE COLD HER RIGHT HAND IS TINGLING AND NUMB. PATIENT ALSO WANTED TO NOTE THAT THE APPETITE MEDICATION SHE WAS RX'D HAS REALLY HELPED PATIENT. PLEASE ADVISE.

## 2025-01-17 NOTE — TELEPHONE ENCOUNTER
Will start patient on gabapentin 100 mg nightly to be increased to three times daily if tolerated for neuropathy secondary to chemotherapy.

## 2025-01-28 ENCOUNTER — HOSPITAL ENCOUNTER (OUTPATIENT)
Dept: ONCOLOGY | Facility: HOSPITAL | Age: 75
Discharge: HOME | End: 2025-01-28
Admitting: INTERNAL MEDICINE
Payer: MEDICARE

## 2025-01-28 VITALS
DIASTOLIC BLOOD PRESSURE: 46 MMHG | SYSTOLIC BLOOD PRESSURE: 103 MMHG | WEIGHT: 106 LBS | BODY MASS INDEX: 19.51 KG/M2 | TEMPERATURE: 97 F | RESPIRATION RATE: 16 BRPM | HEIGHT: 62 IN | HEART RATE: 77 BPM

## 2025-01-28 DIAGNOSIS — C25.0 MALIGNANT TUMOR OF HEAD OF PANCREAS: ICD-10-CM

## 2025-01-28 DIAGNOSIS — Z45.2 ENCOUNTER FOR CARE RELATED TO PORT-A-CATH: Primary | ICD-10-CM

## 2025-01-28 LAB
ALBUMIN SERPL-MCNC: 3.8 G/DL (ref 3.5–5.2)
ALBUMIN/GLOB SERPL: 1.6 G/DL
ALP SERPL-CCNC: 254 U/L (ref 39–117)
ALT SERPL W P-5'-P-CCNC: 19 U/L (ref 1–33)
ANION GAP SERPL CALCULATED.3IONS-SCNC: 12 MMOL/L (ref 5–15)
AST SERPL-CCNC: 18 U/L (ref 1–32)
BASOPHILS # BLD AUTO: 0.04 10*3/MM3 (ref 0–0.2)
BASOPHILS NFR BLD AUTO: 0.6 % (ref 0–1.5)
BILIRUB SERPL-MCNC: 0.3 MG/DL (ref 0–1.2)
BUN SERPL-MCNC: 18 MG/DL (ref 8–23)
BUN/CREAT SERPL: 27.3 (ref 7–25)
CALCIUM SPEC-SCNC: 8.7 MG/DL (ref 8.6–10.5)
CHLORIDE SERPL-SCNC: 104 MMOL/L (ref 98–107)
CO2 SERPL-SCNC: 20 MMOL/L (ref 22–29)
CREAT SERPL-MCNC: 0.66 MG/DL (ref 0.57–1)
DEPRECATED RDW RBC AUTO: 70 FL (ref 37–54)
EGFRCR SERPLBLD CKD-EPI 2021: 91 ML/MIN/1.73
EOSINOPHIL # BLD AUTO: 0.11 10*3/MM3 (ref 0–0.4)
EOSINOPHIL NFR BLD AUTO: 1.7 % (ref 0.3–6.2)
ERYTHROCYTE [DISTWIDTH] IN BLOOD BY AUTOMATED COUNT: 20.4 % (ref 12.3–15.4)
GLOBULIN UR ELPH-MCNC: 2.4 GM/DL
GLUCOSE SERPL-MCNC: 135 MG/DL (ref 65–99)
HCT VFR BLD AUTO: 27.5 % (ref 34–46.6)
HGB BLD-MCNC: 8.8 G/DL (ref 12–15.9)
IMM GRANULOCYTES # BLD AUTO: 0.04 10*3/MM3 (ref 0–0.05)
IMM GRANULOCYTES NFR BLD AUTO: 0.6 % (ref 0–0.5)
LYMPHOCYTES # BLD AUTO: 0.84 10*3/MM3 (ref 0.7–3.1)
LYMPHOCYTES NFR BLD AUTO: 13.3 % (ref 19.6–45.3)
MCH RBC QN AUTO: 30.4 PG (ref 26.6–33)
MCHC RBC AUTO-ENTMCNC: 32 G/DL (ref 31.5–35.7)
MCV RBC AUTO: 95.2 FL (ref 79–97)
MONOCYTES # BLD AUTO: 0.93 10*3/MM3 (ref 0.1–0.9)
MONOCYTES NFR BLD AUTO: 14.7 % (ref 5–12)
NEUTROPHILS NFR BLD AUTO: 4.37 10*3/MM3 (ref 1.7–7)
NEUTROPHILS NFR BLD AUTO: 69.1 % (ref 42.7–76)
PLATELET # BLD AUTO: 161 10*3/MM3 (ref 140–450)
PMV BLD AUTO: 9.9 FL (ref 6–12)
POTASSIUM SERPL-SCNC: 4.8 MMOL/L (ref 3.5–5.2)
PROT SERPL-MCNC: 6.2 G/DL (ref 6–8.5)
RBC # BLD AUTO: 2.89 10*6/MM3 (ref 3.77–5.28)
SODIUM SERPL-SCNC: 136 MMOL/L (ref 136–145)
WBC NRBC COR # BLD AUTO: 6.33 10*3/MM3 (ref 3.4–10.8)

## 2025-01-28 PROCEDURE — 36591 DRAW BLOOD OFF VENOUS DEVICE: CPT

## 2025-01-28 PROCEDURE — 25010000002 HEPARIN LOCK FLUSH PER 10 UNITS: Performed by: INTERNAL MEDICINE

## 2025-01-28 PROCEDURE — 80053 COMPREHEN METABOLIC PANEL: CPT | Performed by: INTERNAL MEDICINE

## 2025-01-28 PROCEDURE — 85025 COMPLETE CBC W/AUTO DIFF WBC: CPT | Performed by: INTERNAL MEDICINE

## 2025-01-28 PROCEDURE — 86301 IMMUNOASSAY TUMOR CA 19-9: CPT | Performed by: INTERNAL MEDICINE

## 2025-01-28 RX ORDER — SODIUM CHLORIDE 0.9 % (FLUSH) 0.9 %
10 SYRINGE (ML) INJECTION AS NEEDED
Status: CANCELLED | OUTPATIENT
Start: 2025-01-28

## 2025-01-28 RX ORDER — HEPARIN SODIUM (PORCINE) LOCK FLUSH IV SOLN 100 UNIT/ML 100 UNIT/ML
500 SOLUTION INTRAVENOUS AS NEEDED
Status: DISCONTINUED | OUTPATIENT
Start: 2025-01-28 | End: 2025-01-29 | Stop reason: HOSPADM

## 2025-01-28 RX ORDER — SODIUM CHLORIDE 0.9 % (FLUSH) 0.9 %
10 SYRINGE (ML) INJECTION AS NEEDED
Status: DISCONTINUED | OUTPATIENT
Start: 2025-01-28 | End: 2025-01-29 | Stop reason: HOSPADM

## 2025-01-28 RX ORDER — SODIUM CHLORIDE 0.9 % (FLUSH) 0.9 %
20 SYRINGE (ML) INJECTION AS NEEDED
Status: CANCELLED | OUTPATIENT
Start: 2025-01-28

## 2025-01-28 RX ORDER — HEPARIN SODIUM (PORCINE) LOCK FLUSH IV SOLN 100 UNIT/ML 100 UNIT/ML
500 SOLUTION INTRAVENOUS AS NEEDED
Status: CANCELLED | OUTPATIENT
Start: 2025-01-28

## 2025-01-28 RX ADMIN — Medication 10 ML: at 14:37

## 2025-01-28 RX ADMIN — HEPARIN 500 UNITS: 100 SYRINGE at 14:37

## 2025-01-29 LAB — CANCER AG19-9 SERPL-ACNC: 2814 U/ML

## 2025-01-30 ENCOUNTER — OFFICE VISIT (OUTPATIENT)
Dept: ONCOLOGY | Facility: CLINIC | Age: 75
End: 2025-01-30
Payer: MEDICARE

## 2025-01-30 ENCOUNTER — HOSPITAL ENCOUNTER (OUTPATIENT)
Dept: ONCOLOGY | Facility: HOSPITAL | Age: 75
Discharge: HOME | End: 2025-01-30
Admitting: INTERNAL MEDICINE
Payer: MEDICARE

## 2025-01-30 VITALS
BODY MASS INDEX: 19.32 KG/M2 | RESPIRATION RATE: 20 BRPM | DIASTOLIC BLOOD PRESSURE: 64 MMHG | TEMPERATURE: 98 F | WEIGHT: 105 LBS | HEIGHT: 62 IN | HEART RATE: 74 BPM | OXYGEN SATURATION: 98 % | SYSTOLIC BLOOD PRESSURE: 134 MMHG

## 2025-01-30 DIAGNOSIS — C25.0 MALIGNANT TUMOR OF HEAD OF PANCREAS: Primary | ICD-10-CM

## 2025-01-30 DIAGNOSIS — Z45.2 ENCOUNTER FOR CARE RELATED TO PORT-A-CATH: ICD-10-CM

## 2025-01-30 PROCEDURE — 25010000002 GEMCITABINE HCL 2 GM/20ML SOLUTION 20 ML VIAL: Performed by: INTERNAL MEDICINE

## 2025-01-30 PROCEDURE — 25010000002 HEPARIN LOCK FLUSH PER 10 UNITS: Performed by: INTERNAL MEDICINE

## 2025-01-30 PROCEDURE — 25810000003 SODIUM CHLORIDE 0.9 % SOLUTION 250 ML FLEX CONT: Performed by: INTERNAL MEDICINE

## 2025-01-30 PROCEDURE — 96375 TX/PRO/DX INJ NEW DRUG ADDON: CPT

## 2025-01-30 PROCEDURE — 25010000002 DEXAMETHASONE SODIUM PHOSPHATE 100 MG/10ML SOLUTION: Performed by: INTERNAL MEDICINE

## 2025-01-30 PROCEDURE — 96413 CHEMO IV INFUSION 1 HR: CPT

## 2025-01-30 PROCEDURE — 99214 OFFICE O/P EST MOD 30 MIN: CPT | Performed by: INTERNAL MEDICINE

## 2025-01-30 PROCEDURE — 96417 CHEMO IV INFUS EACH ADDL SEQ: CPT

## 2025-01-30 PROCEDURE — 25010000002 PACLITAXEL PROTEIN-BOUND PART PER 1 MG: Performed by: INTERNAL MEDICINE

## 2025-01-30 PROCEDURE — 1126F AMNT PAIN NOTED NONE PRSNT: CPT | Performed by: INTERNAL MEDICINE

## 2025-01-30 RX ORDER — PACLITAXEL 100 MG/20ML
125 INJECTION, POWDER, LYOPHILIZED, FOR SUSPENSION INTRAVENOUS ONCE
Status: COMPLETED | OUTPATIENT
Start: 2025-01-30 | End: 2025-01-30

## 2025-01-30 RX ORDER — PACLITAXEL 100 MG/20ML
125 INJECTION, POWDER, LYOPHILIZED, FOR SUSPENSION INTRAVENOUS ONCE
OUTPATIENT
Start: 2025-03-13

## 2025-01-30 RX ORDER — SODIUM CHLORIDE 0.9 % (FLUSH) 0.9 %
10 SYRINGE (ML) INJECTION AS NEEDED
OUTPATIENT
Start: 2025-01-30

## 2025-01-30 RX ORDER — HEPARIN SODIUM (PORCINE) LOCK FLUSH IV SOLN 100 UNIT/ML 100 UNIT/ML
500 SOLUTION INTRAVENOUS AS NEEDED
OUTPATIENT
Start: 2025-01-30

## 2025-01-30 RX ORDER — PACLITAXEL 100 MG/20ML
125 INJECTION, POWDER, LYOPHILIZED, FOR SUSPENSION INTRAVENOUS ONCE
OUTPATIENT
Start: 2025-02-27

## 2025-01-30 RX ORDER — SODIUM CHLORIDE 9 MG/ML
20 INJECTION, SOLUTION INTRAVENOUS ONCE
OUTPATIENT
Start: 2025-02-27

## 2025-01-30 RX ORDER — SODIUM CHLORIDE 9 MG/ML
20 INJECTION, SOLUTION INTRAVENOUS ONCE
Status: DISCONTINUED | OUTPATIENT
Start: 2025-01-30 | End: 2025-02-01 | Stop reason: HOSPADM

## 2025-01-30 RX ORDER — SODIUM CHLORIDE 0.9 % (FLUSH) 0.9 %
20 SYRINGE (ML) INJECTION AS NEEDED
OUTPATIENT
Start: 2025-01-30

## 2025-01-30 RX ORDER — SODIUM CHLORIDE 9 MG/ML
20 INJECTION, SOLUTION INTRAVENOUS ONCE
OUTPATIENT
Start: 2025-03-13

## 2025-01-30 RX ORDER — HEPARIN SODIUM (PORCINE) LOCK FLUSH IV SOLN 100 UNIT/ML 100 UNIT/ML
500 SOLUTION INTRAVENOUS AS NEEDED
Status: DISCONTINUED | OUTPATIENT
Start: 2025-01-30 | End: 2025-02-01 | Stop reason: HOSPADM

## 2025-01-30 RX ADMIN — PACLITAXEL 190 MG: 100 INJECTION, POWDER, LYOPHILIZED, FOR SUSPENSION INTRAVENOUS at 12:46

## 2025-01-30 RX ADMIN — SODIUM CHLORIDE 1500 MG: 9 INJECTION, SOLUTION INTRAVENOUS at 13:41

## 2025-01-30 RX ADMIN — DEXAMETHASONE SODIUM PHOSPHATE 12 MG: 100 INJECTION INTRAMUSCULAR; INTRAVENOUS at 12:00

## 2025-01-30 RX ADMIN — HEPARIN 500 UNITS: 100 SYRINGE at 14:23

## 2025-01-30 NOTE — PROGRESS NOTES
PROBLEM LIST:  Oncology/Hematology History   Malignant tumor of head of pancreas   10/2/2024 Cancer Staged    Staging form: Exocrine Pancreas, AJCC 8th Edition  - Clinical stage from 10/2/2024: Stage IV (cT2, cN0, cM1) - Signed by Ni Jackson MD on 10/4/2024     10/3/2024 Initial Diagnosis    Malignant tumor of head of pancreas     10/10/2024 -  Chemotherapy    OP PANCREATIC PACLitaxel Protein-Bound / Gemcitabine         REASON FOR VISIT: Pancreatic cancer    HISTORY OF PRESENT ILLNESS:   74 y.o.  female presents today for follow-up of her pancreatic cancer.  She is undergoing chemotherapy with Abraxane and gemcitabine.  She has completed 3 cycles.    Clinically doing reasonably well.  She is tolerating everything well.   So far no infections.  No issues with pain.  She started to become more fatigued.    Past medical history, social history and family history was reviewed 01/30/25 and unchanged from prior visit.    Review of Systems:    Review of Systems   Constitutional:  Positive for fatigue.   HENT:  Negative.     Eyes: Negative.    Respiratory: Negative.     Cardiovascular: Negative.    Gastrointestinal: Negative.    Endocrine: Negative.    Genitourinary: Negative.     Musculoskeletal: Negative.    Skin: Negative.    Neurological: Negative.    Hematological: Negative.    Psychiatric/Behavioral: Negative.              Medications:        Current Outpatient Medications:     amLODIPine-benazepril (LOTREL 5-10) 5-10 MG per capsule, Take 1 capsule by mouth Daily., Disp: , Rfl:     gabapentin (NEURONTIN) 100 MG capsule, Take 1 capsule by mouth 3 (Three) Times a Day., Disp: 90 capsule, Rfl: 0    levothyroxine (SYNTHROID, LEVOTHROID) 50 MCG tablet, Take 1 tablet by mouth., Disp: , Rfl:     lidocaine-prilocaine (EMLA) 2.5-2.5 % cream, Apply 1 Application topically to the appropriate area as directed As Needed (45-60 minutes prior to port access.  Cover with saran/plastic wrap)., Disp: 30 g, Rfl: 2    megestrol  "(MEGACE) 40 MG/ML suspension, Take 20 mL by mouth Daily., Disp: 480 mL, Rfl: 1    naloxone (NARCAN) 4 MG/0.1ML nasal spray, Administer 1 spray into the nostril(s) as directed by provider As Needed for Opioid Reversal or Respiratory Depression., Disp: 1 each, Rfl: 0    ondansetron (ZOFRAN) 8 MG tablet, Take 1 tablet by mouth 3 (Three) Times a Day As Needed for Nausea or Vomiting., Disp: 30 tablet, Rfl: 5    ondansetron ODT (ZOFRAN-ODT) 8 MG disintegrating tablet, Place 1 tablet on the tongue Every 8 (Eight) Hours As Needed for Nausea or Vomiting., Disp: 30 tablet, Rfl: 5    oxyCODONE (ROXICODONE) 10 MG tablet, Take 1 tablet by mouth Every 4 (Four) Hours As Needed for Moderate Pain for up to 30 days., Disp: 120 tablet, Rfl: 0    rosuvastatin (CRESTOR) 5 MG tablet, Take 1 tablet by mouth Daily., Disp: , Rfl:   No current facility-administered medications for this visit.    Facility-Administered Medications Ordered in Other Visits:     Gemcitabine HCl (GEMZAR) 1,500 mg in sodium chloride 0.9 % 265 mL chemo IVPB, 1,000 mg/m2 (Treatment Plan Recorded), Intravenous, Once, Ni Jackson MD    heparin injection 500 Units, 500 Units, Intravenous, PRN, Ni Jackson MD    PACLitaxel-protein bound (ABRAXANE) chemo infusion 190 mg, 125 mg/m2 (Treatment Plan Recorded), Intravenous, Once, Ni Jackson MD    sodium chloride 0.9 % infusion, 20 mL/hr, Intravenous, Once, Ni Jackson MD    Pain Medications               gabapentin (NEURONTIN) 100 MG capsule Take 1 capsule by mouth 3 (Three) Times a Day.    oxyCODONE (ROXICODONE) 10 MG tablet Take 1 tablet by mouth Every 4 (Four) Hours As Needed for Moderate Pain for up to 30 days.               ALLERGIES:  No Known Allergies      Physical Exam    VITAL SIGNS:  /64 Comment: LUE  Pulse 74   Temp 98 °F (36.7 °C) (Temporal)   Resp 20   Ht 157.5 cm (62\")   Wt 47.6 kg (105 lb)   SpO2 98% Comment: RA  BMI 19.20 kg/m²     ECOG score: 0       "     Wt Readings from Last 3 Encounters:   01/30/25 47.6 kg (105 lb)   01/28/25 48.1 kg (106 lb)   01/16/25 46.7 kg (103 lb)       Body mass index is 19.2 kg/m². Body surface area is 1.45 meters squared.       Performance Status: 1    General: well appearing, in no acute distress  HEENT: sclera anicteric, neck is supple  Lymphatics: no cervical, supraclavicular, or axillary adenopathy  Cardiovascular: regular rate and rhythm, no murmurs, rubs or gallops  Lungs: clear to auscultation bilaterally  Abdomen: soft, nontender, nondistended.  No palpable organomegaly  Extremities: no lower extremity edema  Skin: no rashes, lesions, bruising, or petechiae  Msk:  Shows no weakness of the large muscle groups  Psych: Mood is stable        RECENT LABS:    Lab Results   Component Value Date    HGB 8.8 (L) 01/28/2025    HCT 27.5 (L) 01/28/2025    MCV 95.2 01/28/2025     01/28/2025    WBC 6.33 01/28/2025    NEUTROABS 4.37 01/28/2025    LYMPHSABS 0.84 01/28/2025    MONOSABS 0.93 (H) 01/28/2025    EOSABS 0.11 01/28/2025    BASOSABS 0.04 01/28/2025       Lab Results   Component Value Date    GLUCOSE 135 (H) 01/28/2025    BUN 18 01/28/2025    CREATININE 0.66 01/28/2025     01/28/2025    K 4.8 01/28/2025     01/28/2025    CO2 20.0 (L) 01/28/2025    CALCIUM 8.7 01/28/2025    PROTEINTOT 6.2 01/28/2025    ALBUMIN 3.8 01/28/2025    BILITOT 0.3 01/28/2025    ALKPHOS 254 (H) 01/28/2025    AST 18 01/28/2025    ALT 19 01/28/2025     Lab Results   Component Value Date     2,814.0 (H) 01/28/2025     3,752.0 (H) 12/31/2024     5,709.0 (H) 12/03/2024     13,030.0 (H) 11/05/2024     36,750.0 (H) 10/08/2024       CT Abdomen Pelvis With & Without Contrast    Result Date: 12/30/2024  Impression: Primary pancreatic head neoplasm is grossly similar in size. Grossly similar innumerable hepatic metastases, omental nodularity, and mesenteric lymphadenopathy. Slight interval recanalization of the previously  occluded portosplenomesenteric venous confluence. New small volume free fluid. Electronically Signed: Merrill Duncan MD  12/30/2024 6:17 PM EST  Workstation ID: [id number]    XR Chest 1 View    Result Date: 10/7/2024  Impression: No acute cardiopulmonary findings. Electronically Signed: Merrill Duncan MD  10/7/2024 10:43 AM EDT  Workstation ID: [id number]    MAMMO SCREENING DIGITAL TOMOSYNTHESIS BILATERAL W CAD    Result Date: 10/1/2024  FINAL IMPRESSION: ACR BI-RADS 2: Benign findings. RECOMMENDATIONS: Routine annual screening mammography. A letter including results and recommendations was sent to the patient. Density notification was provided to all patients. Patient information entered into a reminder system with a target due date for the next mammogram. At our facility, a [place] marker is positioned over a visible skin lesion and a linear marker is used to indicate a scar. A triangular marker is placed on a self reported palpable finding. Note: Mammography does not detect approximately 10-15% of breast cancers. An annual clinical breast exam by the patient's breast care physician and regular monthly self breast exams by the patient are integral parts of breast cancer screening, in addition to annual mammography. A normal mammogram does not completely exclude the presence of breast cancer, especially if there is an abnormal finding on physical exam. When clinically indicated, a biopsy should not be deferred because of a normal mammogram report. cc:     MRI Abdomen With & Without Contrast    Result Date: 9/25/2024  Impression: Pancreatic head mass measuring around 3 cm, likely adenocarcinoma. There is resultant pancreaticobiliary ductal obstruction as well as portosplenomesenteric venous occlusion. Innumerable hepatic metastases. Suspected omental nodularity and mesenteric lymphadenopathy, which may represent disease involvement. Right lower lobe pulmonary nodules. Electronically Signed: Merrill Duncan MD  9/25/2024  11:59 PM EDT  Workstation ID: [id number]    CT Abdomen Pelvis Without Contrast    Result Date: 9/24/2024  Impression: 1.There appears to be irregular enlargement of the pancreatic head concerning for pancreatic neoplasm with mild pancreatic duct dilation. MRI abdomen with and without contrast recommended. 2.Nonspecific central mesenteric edema with few anterior omental vague soft tissue density nodules which may represent lymph nodes. Omental metastatic implants not excluded 3.Slightly larger right lower lobe pulmonary nodules. Metastatic disease is a consideration. Electronically Signed: Kalen Murray MD  9/24/2024 3:39 PM EDT  Workstation ID: [id number]      Assessment/Plan    1.  Stage IV adenocarcinoma of the pancreas.   her tumor markers have improved .  She is however becoming more anemic.  Will have to watch this.  Going to plan to rescan her in 1 month.  We will continue Abraxane and gemcitabine every other week.  No dose changes.    2.  Heartburn secondary to chemotherapy.  on PPI and baking soda with water.      3.  Constipation.  Improve constipation with MiraLAX twice a day along with stool softener.        Total time of patient care on day of service including time prior to, face to face with patient, and following visit spent in reviewing records, lab results, imaging studies, discussion with patient, and documentation/charting was > 31 minutes.     Ni Jackson MD  [facility] Hematology and Oncology    Return on: 02/27/25  Return in (Approximately): 1 month, Schedule with next infusion    Orders Placed This Encounter   Procedures    CT Abdomen Pelvis With & Without Contrast    Cancer Antigen 19-9    Comprehensive metabolic panel    Comprehensive metabolic panel    CBC and Differential    CBC and Differential       1/30/2025     Future Appointments         Provider Department Center    2/11/2025 1:30 PM ACCESS AND LAB DRAW CHAIR 1 [facility] OUTPATIENT ONCOLOGY TC    2/13/2025  10:00 AM CHAIR 14 [facility] OUTPATIENT ONCOLOGY TC    2/24/2025 2:45 PM TC [facility] CT 1 [facility] CT AT [facility]    2/25/2025 2:00 PM ACCESS AND LAB DRAW CHAIR 1 [facility] OUTPATIENT ONCOLOGY TC    2/27/2025 11:00 AM (Arrive by 10:45 AM) Ni Jackson MD [facility] HEMATOLOGY & ONCOLOGY TC    2/27/2025 11:30 AM CHAIR 10 [facility] OUTPATIENT ONCOLOGY TC    3/17/2025 2:00 PM Michelle Arroyo PA-C [facility] PALLIATIVE CARE TC

## 2025-01-31 ENCOUNTER — TELEPHONE (OUTPATIENT)
Dept: ONCOLOGY | Facility: CLINIC | Age: 75
End: 2025-01-31
Payer: MEDICARE

## 2025-01-31 DIAGNOSIS — C25.0 MALIGNANT TUMOR OF HEAD OF PANCREAS: Primary | ICD-10-CM

## 2025-01-31 NOTE — TELEPHONE ENCOUNTER
Caller: Claudia Flores    Relationship: Self    Best call back number: [phone]    Who are you requesting to speak with (clinical staff, provider,  specific staff member): CLINICAL       What was the call regarding: CLAUDIA WAS IN YESTERDAY AND DR CARDENAS WAS SUPPOSE TO CALL IN AN ENZYME FOR HER GAS, IT HAS NOT BEEN CALLED IN    PLEASE CALL IN AND ADVISE

## 2025-01-31 NOTE — TELEPHONE ENCOUNTER
Discussed with Dr Ellis. Prescription sent to pt pharmacy for creon 24,000 units TID with meals. RN called pt and let her know. PT verbalized understanding.

## 2025-02-06 DIAGNOSIS — R63.0 ANOREXIA: ICD-10-CM

## 2025-02-06 RX ORDER — MEGESTROL ACETATE 40 MG/ML
800 SUSPENSION ORAL DAILY
Qty: 480 ML | Refills: 1 | Status: SHIPPED | OUTPATIENT
Start: 2025-02-06

## 2025-02-06 NOTE — TELEPHONE ENCOUNTER
Medication requested: megestrol (MEGACE) 40 MG/ML suspension     Last fill date: 12/17/24    Last appointment: 12/17/24    Next appointment: 3/17/25   Pt has concerns about hiatal hernia and reflux.  Wants to discuss diet to control symptoms.  There are no preventive care reminders to display for this patient.    Patient is up to date, no discussion needed.  Medications reviewed and updated.  Pt reports possible Latex allergy.

## 2025-02-10 ENCOUNTER — TELEPHONE (OUTPATIENT)
Dept: PALLIATIVE CARE | Facility: CLINIC | Age: 75
End: 2025-02-10
Payer: MEDICARE

## 2025-02-10 DIAGNOSIS — G89.3 CANCER RELATED PAIN: Primary | ICD-10-CM

## 2025-02-10 RX ORDER — OXYCODONE HYDROCHLORIDE 10 MG/1
10 TABLET ORAL EVERY 4 HOURS PRN
Qty: 120 TABLET | Refills: 0 | Status: SHIPPED | OUTPATIENT
Start: 2025-02-10 | End: 2025-03-12

## 2025-02-10 NOTE — TELEPHONE ENCOUNTER
Left voicemail message to return call to schedule appointment    PATIENT CALLED AND STATED THAT SHE NEEDED A REFILL ON HYDROCODONE 10 MG SENT IN TO PHARMACY. PLEASE ADVISE.

## 2025-02-10 NOTE — TELEPHONE ENCOUNTER
LETICIA #: [id number]    Medication requested: oxycodone 10 MG    Last fill date:1/8/25    Last appointment: 12/17/24    Next appointment: 3/17/25

## 2025-02-11 ENCOUNTER — HOSPITAL ENCOUNTER (OUTPATIENT)
Dept: ONCOLOGY | Facility: HOSPITAL | Age: 75
Discharge: HOME | End: 2025-02-11
Admitting: INTERNAL MEDICINE
Payer: MEDICARE

## 2025-02-11 VITALS
HEIGHT: 62 IN | SYSTOLIC BLOOD PRESSURE: 119 MMHG | RESPIRATION RATE: 16 BRPM | HEART RATE: 80 BPM | BODY MASS INDEX: 19.69 KG/M2 | WEIGHT: 107 LBS | DIASTOLIC BLOOD PRESSURE: 42 MMHG | TEMPERATURE: 97.4 F

## 2025-02-11 DIAGNOSIS — C25.0 MALIGNANT TUMOR OF HEAD OF PANCREAS: ICD-10-CM

## 2025-02-11 DIAGNOSIS — Z45.2 ENCOUNTER FOR CARE RELATED TO PORT-A-CATH: Primary | ICD-10-CM

## 2025-02-11 LAB
ALBUMIN SERPL-MCNC: 3.6 G/DL (ref 3.5–5.2)
ALBUMIN/GLOB SERPL: 1.5 G/DL
ALP SERPL-CCNC: 232 U/L (ref 39–117)
ALT SERPL W P-5'-P-CCNC: 17 U/L (ref 1–33)
ANION GAP SERPL CALCULATED.3IONS-SCNC: 10 MMOL/L (ref 5–15)
AST SERPL-CCNC: 19 U/L (ref 1–32)
BASOPHILS # BLD AUTO: 0.04 10*3/MM3 (ref 0–0.2)
BASOPHILS NFR BLD AUTO: 0.8 % (ref 0–1.5)
BILIRUB SERPL-MCNC: 0.2 MG/DL (ref 0–1.2)
BUN SERPL-MCNC: 18 MG/DL (ref 8–23)
BUN/CREAT SERPL: 29.5 (ref 7–25)
CALCIUM SPEC-SCNC: 8.3 MG/DL (ref 8.6–10.5)
CHLORIDE SERPL-SCNC: 102 MMOL/L (ref 98–107)
CO2 SERPL-SCNC: 21 MMOL/L (ref 22–29)
CREAT SERPL-MCNC: 0.61 MG/DL (ref 0.57–1)
DEPRECATED RDW RBC AUTO: 69.9 FL (ref 37–54)
EGFRCR SERPLBLD CKD-EPI 2021: 92.8 ML/MIN/1.73
EOSINOPHIL # BLD AUTO: 0.13 10*3/MM3 (ref 0–0.4)
EOSINOPHIL NFR BLD AUTO: 2.5 % (ref 0.3–6.2)
ERYTHROCYTE [DISTWIDTH] IN BLOOD BY AUTOMATED COUNT: 20.6 % (ref 12.3–15.4)
GLOBULIN UR ELPH-MCNC: 2.4 GM/DL
GLUCOSE SERPL-MCNC: 129 MG/DL (ref 65–99)
HCT VFR BLD AUTO: 28 % (ref 34–46.6)
HGB BLD-MCNC: 9.3 G/DL (ref 12–15.9)
IMM GRANULOCYTES # BLD AUTO: 0.11 10*3/MM3 (ref 0–0.05)
IMM GRANULOCYTES NFR BLD AUTO: 2.1 % (ref 0–0.5)
LYMPHOCYTES # BLD AUTO: 1.23 10*3/MM3 (ref 0.7–3.1)
LYMPHOCYTES NFR BLD AUTO: 23.5 % (ref 19.6–45.3)
MCH RBC QN AUTO: 30.9 PG (ref 26.6–33)
MCHC RBC AUTO-ENTMCNC: 33.2 G/DL (ref 31.5–35.7)
MCV RBC AUTO: 93 FL (ref 79–97)
MONOCYTES # BLD AUTO: 1.04 10*3/MM3 (ref 0.1–0.9)
MONOCYTES NFR BLD AUTO: 19.9 % (ref 5–12)
NEUTROPHILS NFR BLD AUTO: 2.68 10*3/MM3 (ref 1.7–7)
NEUTROPHILS NFR BLD AUTO: 51.2 % (ref 42.7–76)
PLATELET # BLD AUTO: 211 10*3/MM3 (ref 140–450)
PMV BLD AUTO: 9.4 FL (ref 6–12)
POTASSIUM SERPL-SCNC: 4.2 MMOL/L (ref 3.5–5.2)
PROT SERPL-MCNC: 6 G/DL (ref 6–8.5)
RBC # BLD AUTO: 3.01 10*6/MM3 (ref 3.77–5.28)
SODIUM SERPL-SCNC: 133 MMOL/L (ref 136–145)
WBC NRBC COR # BLD AUTO: 5.23 10*3/MM3 (ref 3.4–10.8)

## 2025-02-11 PROCEDURE — 80053 COMPREHEN METABOLIC PANEL: CPT | Performed by: INTERNAL MEDICINE

## 2025-02-11 PROCEDURE — 36591 DRAW BLOOD OFF VENOUS DEVICE: CPT

## 2025-02-11 PROCEDURE — 85025 COMPLETE CBC W/AUTO DIFF WBC: CPT | Performed by: INTERNAL MEDICINE

## 2025-02-11 PROCEDURE — 25010000002 HEPARIN LOCK FLUSH PER 10 UNITS: Performed by: INTERNAL MEDICINE

## 2025-02-11 RX ORDER — SODIUM CHLORIDE 0.9 % (FLUSH) 0.9 %
20 SYRINGE (ML) INJECTION AS NEEDED
Status: CANCELLED | OUTPATIENT
Start: 2025-02-11

## 2025-02-11 RX ORDER — HEPARIN SODIUM (PORCINE) LOCK FLUSH IV SOLN 100 UNIT/ML 100 UNIT/ML
500 SOLUTION INTRAVENOUS AS NEEDED
Status: CANCELLED | OUTPATIENT
Start: 2025-02-11

## 2025-02-11 RX ORDER — HEPARIN SODIUM (PORCINE) LOCK FLUSH IV SOLN 100 UNIT/ML 100 UNIT/ML
500 SOLUTION INTRAVENOUS AS NEEDED
Status: DISCONTINUED | OUTPATIENT
Start: 2025-02-11 | End: 2025-02-12 | Stop reason: HOSPADM

## 2025-02-11 RX ORDER — SODIUM CHLORIDE 0.9 % (FLUSH) 0.9 %
10 SYRINGE (ML) INJECTION AS NEEDED
Status: CANCELLED | OUTPATIENT
Start: 2025-02-11

## 2025-02-11 RX ADMIN — HEPARIN 500 UNITS: 100 SYRINGE at 13:39

## 2025-02-13 ENCOUNTER — HOSPITAL ENCOUNTER (OUTPATIENT)
Dept: ONCOLOGY | Facility: HOSPITAL | Age: 75
Discharge: HOME | End: 2025-02-13
Admitting: INTERNAL MEDICINE
Payer: MEDICARE

## 2025-02-13 VITALS
RESPIRATION RATE: 16 BRPM | HEART RATE: 85 BPM | SYSTOLIC BLOOD PRESSURE: 140 MMHG | TEMPERATURE: 97.5 F | HEIGHT: 62 IN | DIASTOLIC BLOOD PRESSURE: 63 MMHG | BODY MASS INDEX: 19.32 KG/M2 | WEIGHT: 105 LBS

## 2025-02-13 DIAGNOSIS — C25.0 MALIGNANT TUMOR OF HEAD OF PANCREAS: Primary | ICD-10-CM

## 2025-02-13 DIAGNOSIS — Z45.2 ENCOUNTER FOR CARE RELATED TO PORT-A-CATH: ICD-10-CM

## 2025-02-13 PROCEDURE — 25810000003 SODIUM CHLORIDE 0.9 % SOLUTION 250 ML FLEX CONT: Performed by: INTERNAL MEDICINE

## 2025-02-13 PROCEDURE — 96375 TX/PRO/DX INJ NEW DRUG ADDON: CPT

## 2025-02-13 PROCEDURE — 25010000002 DEXAMETHASONE SODIUM PHOSPHATE 100 MG/10ML SOLUTION: Performed by: INTERNAL MEDICINE

## 2025-02-13 PROCEDURE — 25010000002 GEMCITABINE HCL 2 GM/20ML SOLUTION 20 ML VIAL: Performed by: INTERNAL MEDICINE

## 2025-02-13 PROCEDURE — 96417 CHEMO IV INFUS EACH ADDL SEQ: CPT

## 2025-02-13 PROCEDURE — 25810000003 SODIUM CHLORIDE 0.9 % SOLUTION: Performed by: INTERNAL MEDICINE

## 2025-02-13 PROCEDURE — 25010000002 HEPARIN LOCK FLUSH PER 10 UNITS: Performed by: INTERNAL MEDICINE

## 2025-02-13 PROCEDURE — 25010000002 PACLITAXEL PROTEIN-BOUND PART PER 1 MG: Performed by: INTERNAL MEDICINE

## 2025-02-13 PROCEDURE — 96413 CHEMO IV INFUSION 1 HR: CPT

## 2025-02-13 RX ORDER — HEPARIN SODIUM (PORCINE) LOCK FLUSH IV SOLN 100 UNIT/ML 100 UNIT/ML
500 SOLUTION INTRAVENOUS AS NEEDED
Status: DISCONTINUED | OUTPATIENT
Start: 2025-02-13 | End: 2025-02-14 | Stop reason: HOSPADM

## 2025-02-13 RX ORDER — SODIUM CHLORIDE 0.9 % (FLUSH) 0.9 %
10 SYRINGE (ML) INJECTION AS NEEDED
OUTPATIENT
Start: 2025-02-13

## 2025-02-13 RX ORDER — SODIUM CHLORIDE 0.9 % (FLUSH) 0.9 %
20 SYRINGE (ML) INJECTION AS NEEDED
OUTPATIENT
Start: 2025-02-13

## 2025-02-13 RX ORDER — HEPARIN SODIUM (PORCINE) LOCK FLUSH IV SOLN 100 UNIT/ML 100 UNIT/ML
500 SOLUTION INTRAVENOUS AS NEEDED
OUTPATIENT
Start: 2025-02-13

## 2025-02-13 RX ORDER — SODIUM CHLORIDE 9 MG/ML
20 INJECTION, SOLUTION INTRAVENOUS ONCE
Status: COMPLETED | OUTPATIENT
Start: 2025-02-13 | End: 2025-02-13

## 2025-02-13 RX ORDER — PACLITAXEL 100 MG/20ML
125 INJECTION, POWDER, LYOPHILIZED, FOR SUSPENSION INTRAVENOUS ONCE
Status: COMPLETED | OUTPATIENT
Start: 2025-02-13 | End: 2025-02-13

## 2025-02-13 RX ADMIN — HEPARIN 500 UNITS: 100 SYRINGE at 12:42

## 2025-02-13 RX ADMIN — GEMCITABINE 1500 MG: 100 INJECTION, SOLUTION INTRAVENOUS at 12:03

## 2025-02-13 RX ADMIN — SODIUM CHLORIDE 20 ML/HR: 9 INJECTION, SOLUTION INTRAVENOUS at 10:21

## 2025-02-13 RX ADMIN — PACLITAXEL 190 MG: 100 INJECTION, POWDER, LYOPHILIZED, FOR SUSPENSION INTRAVENOUS at 11:00

## 2025-02-13 RX ADMIN — DEXAMETHASONE SODIUM PHOSPHATE 12 MG: 100 INJECTION INTRAMUSCULAR; INTRAVENOUS at 10:23

## 2025-02-18 DIAGNOSIS — T45.1X5A CHEMOTHERAPY-INDUCED NEUROPATHY: ICD-10-CM

## 2025-02-18 DIAGNOSIS — G62.0 CHEMOTHERAPY-INDUCED NEUROPATHY: ICD-10-CM

## 2025-02-18 RX ORDER — GABAPENTIN 100 MG/1
100 CAPSULE ORAL 3 TIMES DAILY
Qty: 90 CAPSULE | Refills: 0 | Status: SHIPPED | OUTPATIENT
Start: 2025-02-18 | End: 2025-03-20

## 2025-02-18 NOTE — TELEPHONE ENCOUNTER
LETICIA #: [id number]    Medication requested: gabapentin (NEURONTIN) 100 MG capsule     Last fill date: 1/17/25    Last appointment: 12/17/24    Next appointment: 3/17/25

## 2025-02-24 ENCOUNTER — HOSPITAL ENCOUNTER (OUTPATIENT)
Dept: CT IMAGING | Facility: HOSPITAL | Age: 75
Discharge: HOME | End: 2025-02-24
Admitting: INTERNAL MEDICINE
Payer: MEDICARE

## 2025-02-24 DIAGNOSIS — C25.0 MALIGNANT TUMOR OF HEAD OF PANCREAS: ICD-10-CM

## 2025-02-24 PROCEDURE — 74178 CT ABD&PLV WO CNTR FLWD CNTR: CPT

## 2025-02-24 PROCEDURE — 25510000001 IOPAMIDOL PER 1 ML: Performed by: INTERNAL MEDICINE

## 2025-02-24 RX ORDER — IOPAMIDOL 755 MG/ML
100 INJECTION, SOLUTION INTRAVASCULAR
Status: COMPLETED | OUTPATIENT
Start: 2025-02-24 | End: 2025-02-24

## 2025-02-24 RX ADMIN — IOPAMIDOL 85 ML: 755 INJECTION, SOLUTION INTRAVENOUS at 14:38

## 2025-02-25 ENCOUNTER — HOSPITAL ENCOUNTER (OUTPATIENT)
Dept: ONCOLOGY | Facility: HOSPITAL | Age: 75
Discharge: HOME | End: 2025-02-25
Admitting: INTERNAL MEDICINE
Payer: MEDICARE

## 2025-02-25 VITALS
DIASTOLIC BLOOD PRESSURE: 55 MMHG | SYSTOLIC BLOOD PRESSURE: 113 MMHG | TEMPERATURE: 98.2 F | HEART RATE: 77 BPM | HEIGHT: 63 IN | WEIGHT: 105.2 LBS | BODY MASS INDEX: 18.64 KG/M2 | RESPIRATION RATE: 16 BRPM

## 2025-02-25 DIAGNOSIS — C25.0 MALIGNANT TUMOR OF HEAD OF PANCREAS: Primary | ICD-10-CM

## 2025-02-25 DIAGNOSIS — Z45.2 ENCOUNTER FOR CARE RELATED TO PORT-A-CATH: ICD-10-CM

## 2025-02-25 LAB
ALBUMIN SERPL-MCNC: 3.6 G/DL (ref 3.5–5.2)
ALBUMIN/GLOB SERPL: 1.4 G/DL
ALP SERPL-CCNC: 219 U/L (ref 39–117)
ALT SERPL W P-5'-P-CCNC: 11 U/L (ref 1–33)
ANION GAP SERPL CALCULATED.3IONS-SCNC: 8 MMOL/L (ref 5–15)
AST SERPL-CCNC: 40 U/L (ref 1–32)
BASOPHILS # BLD AUTO: 0.04 10*3/MM3 (ref 0–0.2)
BASOPHILS NFR BLD AUTO: 0.8 % (ref 0–1.5)
BILIRUB SERPL-MCNC: 0.3 MG/DL (ref 0–1.2)
BUN SERPL-MCNC: 18 MG/DL (ref 8–23)
BUN/CREAT SERPL: 25 (ref 7–25)
CALCIUM SPEC-SCNC: 8.5 MG/DL (ref 8.6–10.5)
CANCER AG19-9 SERPL-ACNC: 1956 U/ML
CHLORIDE SERPL-SCNC: 101 MMOL/L (ref 98–107)
CO2 SERPL-SCNC: 24 MMOL/L (ref 22–29)
CREAT SERPL-MCNC: 0.72 MG/DL (ref 0.57–1)
DEPRECATED RDW RBC AUTO: 70.5 FL (ref 37–54)
EGFRCR SERPLBLD CKD-EPI 2021: 86.8 ML/MIN/1.73
EOSINOPHIL # BLD AUTO: 0.08 10*3/MM3 (ref 0–0.4)
EOSINOPHIL NFR BLD AUTO: 1.6 % (ref 0.3–6.2)
ERYTHROCYTE [DISTWIDTH] IN BLOOD BY AUTOMATED COUNT: 20 % (ref 12.3–15.4)
GLOBULIN UR ELPH-MCNC: 2.6 GM/DL
GLUCOSE SERPL-MCNC: 120 MG/DL (ref 65–99)
HCT VFR BLD AUTO: 28.3 % (ref 34–46.6)
HGB BLD-MCNC: 9.1 G/DL (ref 12–15.9)
IMM GRANULOCYTES # BLD AUTO: 0.08 10*3/MM3 (ref 0–0.05)
IMM GRANULOCYTES NFR BLD AUTO: 1.6 % (ref 0–0.5)
LYMPHOCYTES # BLD AUTO: 1.02 10*3/MM3 (ref 0.7–3.1)
LYMPHOCYTES NFR BLD AUTO: 20.4 % (ref 19.6–45.3)
MCH RBC QN AUTO: 30.7 PG (ref 26.6–33)
MCHC RBC AUTO-ENTMCNC: 32.2 G/DL (ref 31.5–35.7)
MCV RBC AUTO: 95.6 FL (ref 79–97)
MONOCYTES # BLD AUTO: 1 10*3/MM3 (ref 0.1–0.9)
MONOCYTES NFR BLD AUTO: 20 % (ref 5–12)
NEUTROPHILS NFR BLD AUTO: 2.79 10*3/MM3 (ref 1.7–7)
NEUTROPHILS NFR BLD AUTO: 55.6 % (ref 42.7–76)
PLATELET # BLD AUTO: 204 10*3/MM3 (ref 140–450)
PMV BLD AUTO: 9.7 FL (ref 6–12)
POTASSIUM SERPL-SCNC: 4.8 MMOL/L (ref 3.5–5.2)
PROT SERPL-MCNC: 6.2 G/DL (ref 6–8.5)
RBC # BLD AUTO: 2.96 10*6/MM3 (ref 3.77–5.28)
SODIUM SERPL-SCNC: 133 MMOL/L (ref 136–145)
WBC NRBC COR # BLD AUTO: 5.01 10*3/MM3 (ref 3.4–10.8)

## 2025-02-25 PROCEDURE — 85025 COMPLETE CBC W/AUTO DIFF WBC: CPT | Performed by: INTERNAL MEDICINE

## 2025-02-25 PROCEDURE — 86301 IMMUNOASSAY TUMOR CA 19-9: CPT | Performed by: INTERNAL MEDICINE

## 2025-02-25 PROCEDURE — 36591 DRAW BLOOD OFF VENOUS DEVICE: CPT

## 2025-02-25 PROCEDURE — 80053 COMPREHEN METABOLIC PANEL: CPT | Performed by: INTERNAL MEDICINE

## 2025-02-25 PROCEDURE — 25010000002 HEPARIN LOCK FLUSH PER 10 UNITS: Performed by: INTERNAL MEDICINE

## 2025-02-25 RX ORDER — SODIUM CHLORIDE 0.9 % (FLUSH) 0.9 %
10 SYRINGE (ML) INJECTION AS NEEDED
Status: DISCONTINUED | OUTPATIENT
Start: 2025-02-25 | End: 2025-02-26 | Stop reason: HOSPADM

## 2025-02-25 RX ORDER — HEPARIN SODIUM (PORCINE) LOCK FLUSH IV SOLN 100 UNIT/ML 100 UNIT/ML
500 SOLUTION INTRAVENOUS AS NEEDED
Status: DISCONTINUED | OUTPATIENT
Start: 2025-02-25 | End: 2025-02-26 | Stop reason: HOSPADM

## 2025-02-25 RX ORDER — HEPARIN SODIUM (PORCINE) LOCK FLUSH IV SOLN 100 UNIT/ML 100 UNIT/ML
500 SOLUTION INTRAVENOUS AS NEEDED
Status: CANCELLED | OUTPATIENT
Start: 2025-02-25

## 2025-02-25 RX ORDER — SODIUM CHLORIDE 0.9 % (FLUSH) 0.9 %
20 SYRINGE (ML) INJECTION AS NEEDED
Status: CANCELLED | OUTPATIENT
Start: 2025-02-25

## 2025-02-25 RX ORDER — SODIUM CHLORIDE 0.9 % (FLUSH) 0.9 %
10 SYRINGE (ML) INJECTION AS NEEDED
Status: CANCELLED | OUTPATIENT
Start: 2025-02-25

## 2025-02-25 RX ADMIN — Medication 10 ML: at 14:20

## 2025-02-25 RX ADMIN — HEPARIN 500 UNITS: 100 SYRINGE at 14:20

## 2025-02-27 ENCOUNTER — OFFICE VISIT (OUTPATIENT)
Dept: ONCOLOGY | Facility: CLINIC | Age: 75
End: 2025-02-27
Payer: MEDICARE

## 2025-02-27 ENCOUNTER — HOSPITAL ENCOUNTER (OUTPATIENT)
Dept: ONCOLOGY | Facility: HOSPITAL | Age: 75
Discharge: HOME | End: 2025-02-27
Payer: MEDICARE

## 2025-02-27 VITALS
WEIGHT: 104 LBS | SYSTOLIC BLOOD PRESSURE: 141 MMHG | HEIGHT: 62 IN | DIASTOLIC BLOOD PRESSURE: 62 MMHG | BODY MASS INDEX: 19.14 KG/M2 | TEMPERATURE: 97.8 F | HEART RATE: 82 BPM | OXYGEN SATURATION: 98 % | RESPIRATION RATE: 18 BRPM

## 2025-02-27 DIAGNOSIS — C25.0 MALIGNANT TUMOR OF HEAD OF PANCREAS: Primary | ICD-10-CM

## 2025-02-27 DIAGNOSIS — Z45.2 ENCOUNTER FOR CARE RELATED TO PORT-A-CATH: ICD-10-CM

## 2025-02-27 PROCEDURE — 25810000003 SODIUM CHLORIDE 0.9 % SOLUTION 250 ML FLEX CONT: Performed by: INTERNAL MEDICINE

## 2025-02-27 PROCEDURE — 25010000002 DEXAMETHASONE SODIUM PHOSPHATE 100 MG/10ML SOLUTION: Performed by: INTERNAL MEDICINE

## 2025-02-27 PROCEDURE — 25810000003 SODIUM CHLORIDE 0.9 % SOLUTION: Performed by: INTERNAL MEDICINE

## 2025-02-27 PROCEDURE — 25010000002 PACLITAXEL PROTEIN-BOUND PART PER 1 MG: Performed by: INTERNAL MEDICINE

## 2025-02-27 PROCEDURE — 96413 CHEMO IV INFUSION 1 HR: CPT

## 2025-02-27 PROCEDURE — 96375 TX/PRO/DX INJ NEW DRUG ADDON: CPT

## 2025-02-27 PROCEDURE — 25010000002 GEMCITABINE HCL 2 GM/20ML SOLUTION 20 ML VIAL: Performed by: INTERNAL MEDICINE

## 2025-02-27 PROCEDURE — 96417 CHEMO IV INFUS EACH ADDL SEQ: CPT

## 2025-02-27 PROCEDURE — 25010000002 HEPARIN LOCK FLUSH PER 10 UNITS: Performed by: INTERNAL MEDICINE

## 2025-02-27 RX ORDER — SODIUM CHLORIDE 9 MG/ML
20 INJECTION, SOLUTION INTRAVENOUS ONCE
OUTPATIENT
Start: 2025-03-27

## 2025-02-27 RX ORDER — SODIUM CHLORIDE 0.9 % (FLUSH) 0.9 %
20 SYRINGE (ML) INJECTION AS NEEDED
OUTPATIENT
Start: 2025-02-27

## 2025-02-27 RX ORDER — HEPARIN SODIUM (PORCINE) LOCK FLUSH IV SOLN 100 UNIT/ML 100 UNIT/ML
500 SOLUTION INTRAVENOUS AS NEEDED
OUTPATIENT
Start: 2025-02-27

## 2025-02-27 RX ORDER — SODIUM CHLORIDE 9 MG/ML
20 INJECTION, SOLUTION INTRAVENOUS ONCE
Status: COMPLETED | OUTPATIENT
Start: 2025-02-27 | End: 2025-02-27

## 2025-02-27 RX ORDER — HEPARIN SODIUM (PORCINE) LOCK FLUSH IV SOLN 100 UNIT/ML 100 UNIT/ML
500 SOLUTION INTRAVENOUS AS NEEDED
Status: DISCONTINUED | OUTPATIENT
Start: 2025-02-27 | End: 2025-02-28 | Stop reason: HOSPADM

## 2025-02-27 RX ORDER — PACLITAXEL 100 MG/20ML
125 INJECTION, POWDER, LYOPHILIZED, FOR SUSPENSION INTRAVENOUS ONCE
Status: COMPLETED | OUTPATIENT
Start: 2025-02-27 | End: 2025-02-27

## 2025-02-27 RX ORDER — SODIUM CHLORIDE 9 MG/ML
20 INJECTION, SOLUTION INTRAVENOUS ONCE
OUTPATIENT
Start: 2025-04-10

## 2025-02-27 RX ORDER — SODIUM CHLORIDE 0.9 % (FLUSH) 0.9 %
10 SYRINGE (ML) INJECTION AS NEEDED
OUTPATIENT
Start: 2025-02-27

## 2025-02-27 RX ORDER — PACLITAXEL 100 MG/20ML
125 INJECTION, POWDER, LYOPHILIZED, FOR SUSPENSION INTRAVENOUS ONCE
OUTPATIENT
Start: 2025-04-10

## 2025-02-27 RX ORDER — PACLITAXEL 100 MG/20ML
125 INJECTION, POWDER, LYOPHILIZED, FOR SUSPENSION INTRAVENOUS ONCE
OUTPATIENT
Start: 2025-03-27

## 2025-02-27 RX ADMIN — SODIUM CHLORIDE 1500 MG: 9 INJECTION, SOLUTION INTRAVENOUS at 13:00

## 2025-02-27 RX ADMIN — PACLITAXEL 190 MG: 100 INJECTION, POWDER, LYOPHILIZED, FOR SUSPENSION INTRAVENOUS at 12:18

## 2025-02-27 RX ADMIN — DEXAMETHASONE SODIUM PHOSPHATE 12 MG: 10 INJECTION, SOLUTION INTRAMUSCULAR; INTRAVENOUS at 11:55

## 2025-02-27 RX ADMIN — HEPARIN 500 UNITS: 100 SYRINGE at 13:35

## 2025-02-27 RX ADMIN — SODIUM CHLORIDE 20 ML/HR: 0.9 INJECTION, SOLUTION INTRAVENOUS at 11:40

## 2025-02-27 NOTE — PROGRESS NOTES
PROBLEM LIST:  Oncology/Hematology History   Malignant tumor of head of pancreas   10/2/2024 Cancer Staged    Staging form: Exocrine Pancreas, AJCC 8th Edition  - Clinical stage from 10/2/2024: Stage IV (cT2, cN0, cM1) - Signed by Ni Jackson MD on 10/4/2024     10/3/2024 Initial Diagnosis    Malignant tumor of head of pancreas     10/10/2024 -  Chemotherapy    OP PANCREATIC PACLitaxel Protein-Bound / Gemcitabine         REASON FOR VISIT: Pancreatic cancer    HISTORY OF PRESENT ILLNESS:   74 y.o.  female presents today for follow-up of her pancreatic cancer.  She is undergoing chemotherapy with Abraxane and gemcitabine.  She has completed 4 cycles.    Clinically doing reasonably well.  She is tolerating everything well.   So far no infections.  No issues with pain.  She does have fatigue.  But no issues with neuropathy.  Appetite seems reasonable.    Past medical history, social history and family history was reviewed 02/27/25 and unchanged from prior visit.    Review of Systems:    Review of Systems   Constitutional:  Positive for fatigue.   HENT:  Negative.     Eyes: Negative.    Respiratory: Negative.     Cardiovascular: Negative.    Gastrointestinal: Negative.    Endocrine: Negative.    Genitourinary: Negative.     Musculoskeletal: Negative.    Skin: Negative.    Neurological: Negative.    Hematological: Negative.    Psychiatric/Behavioral: Negative.              Medications:        Current Outpatient Medications:     amLODIPine-benazepril (LOTREL 5-10) 5-10 MG per capsule, Take 1 capsule by mouth Daily., Disp: , Rfl:     gabapentin (NEURONTIN) 100 MG capsule, Take 1 capsule by mouth 3 (Three) Times a Day for 30 days., Disp: 90 capsule, Rfl: 0    levothyroxine (SYNTHROID, LEVOTHROID) 50 MCG tablet, Take 1 tablet by mouth., Disp: , Rfl:     lidocaine-prilocaine (EMLA) 2.5-2.5 % cream, Apply 1 Application topically to the appropriate area as directed As Needed (45-60 minutes prior to port access.  Cover  with saran/plastic wrap)., Disp: 30 g, Rfl: 2    megestrol (MEGACE) 40 MG/ML suspension, Take 20 mL by mouth Daily., Disp: 480 mL, Rfl: 1    naloxone (NARCAN) 4 MG/0.1ML nasal spray, Administer 1 spray into the nostril(s) as directed by provider As Needed for Opioid Reversal or Respiratory Depression., Disp: 1 each, Rfl: 0    ondansetron (ZOFRAN) 8 MG tablet, Take 1 tablet by mouth 3 (Three) Times a Day As Needed for Nausea or Vomiting., Disp: 30 tablet, Rfl: 5    ondansetron ODT (ZOFRAN-ODT) 8 MG disintegrating tablet, Place 1 tablet on the tongue Every 8 (Eight) Hours As Needed for Nausea or Vomiting., Disp: 30 tablet, Rfl: 5    oxyCODONE (ROXICODONE) 10 MG tablet, Take 1 tablet by mouth Every 4 (Four) Hours As Needed for Moderate Pain for up to 30 days., Disp: 120 tablet, Rfl: 0    Pancrelipase, Lip-Prot-Amyl, (CREON) 0109-5342 units capsule delayed-release particles capsule, Take 8 capsules by mouth 3 (Three) Times a Day With Meals., Disp: 900 capsule, Rfl: 5    rosuvastatin (CRESTOR) 5 MG tablet, Take 1 tablet by mouth Daily., Disp: , Rfl:   No current facility-administered medications for this visit.    Facility-Administered Medications Ordered in Other Visits:     Gemcitabine HCl (GEMZAR) 1,500 mg in sodium chloride 0.9 % 265 mL chemo IVPB, 1,000 mg/m2 (Treatment Plan Recorded), Intravenous, Once, Ni Jackson MD    heparin injection 500 Units, 500 Units, Intravenous, PRN, Ni Jackson MD    PACLitaxel-protein bound (ABRAXANE) chemo infusion 190 mg, 125 mg/m2 (Treatment Plan Recorded), Intravenous, Once, Ni Jackson MD    Pain Medications               gabapentin (NEURONTIN) 100 MG capsule Take 1 capsule by mouth 3 (Three) Times a Day for 30 days.    oxyCODONE (ROXICODONE) 10 MG tablet Take 1 tablet by mouth Every 4 (Four) Hours As Needed for Moderate Pain for up to 30 days.               ALLERGIES:  No Known Allergies      Physical Exam    VITAL SIGNS:  /62 Comment: TIMO   "Pulse 82   Temp 97.8 °F (36.6 °C) (Temporal)   Resp 18   Ht 157.5 cm (62\")   Wt 47.2 kg (104 lb)   SpO2 98% Comment: RA  BMI 19.02 kg/m²     ECOG score: 0           Wt Readings from Last 3 Encounters:   02/27/25 47.2 kg (104 lb)   02/25/25 47.7 kg (105 lb 3.2 oz)   02/13/25 47.6 kg (105 lb)       Body mass index is 19.02 kg/m². Body surface area is 1.45 meters squared.       Performance Status: 1    General: well appearing, in no acute distress  HEENT: sclera anicteric, neck is supple  Lymphatics: no cervical, supraclavicular, or axillary adenopathy  Cardiovascular: regular rate and rhythm, no murmurs, rubs or gallops  Lungs: clear to auscultation bilaterally  Abdomen: soft, nontender, nondistended.  No palpable organomegaly  Extremities: no lower extremity edema  Skin: no rashes, lesions, bruising, or petechiae  Msk:  Shows no weakness of the large muscle groups  Psych: Mood is stable        RECENT LABS:    Lab Results   Component Value Date    HGB 9.1 (L) 02/25/2025    HCT 28.3 (L) 02/25/2025    MCV 95.6 02/25/2025     02/25/2025    WBC 5.01 02/25/2025    NEUTROABS 2.79 02/25/2025    LYMPHSABS 1.02 02/25/2025    MONOSABS 1.00 (H) 02/25/2025    EOSABS 0.08 02/25/2025    BASOSABS 0.04 02/25/2025       Lab Results   Component Value Date    GLUCOSE 120 (H) 02/25/2025    BUN 18 02/25/2025    CREATININE 0.72 02/25/2025     (L) 02/25/2025    K 4.8 02/25/2025     02/25/2025    CO2 24.0 02/25/2025    CALCIUM 8.5 (L) 02/25/2025    PROTEINTOT 6.2 02/25/2025    ALBUMIN 3.6 02/25/2025    BILITOT 0.3 02/25/2025    ALKPHOS 219 (H) 02/25/2025    AST 40 (H) 02/25/2025    ALT 11 02/25/2025     Lab Results   Component Value Date     1,956.0 (H) 02/25/2025     2,814.0 (H) 01/28/2025     3,752.0 (H) 12/31/2024     5,709.0 (H) 12/03/2024     13,030.0 (H) 11/05/2024     CT Abdomen Pelvis With & Without Contrast    Result Date: 2/26/2025  Impression: 1.No significant change in the " pancreatic head neoplasm as above 2.Stable mild intra and extrahepatic biliary ductal dilatation to the level of the pancreatic head mass. 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 4.Slight interval decrease in size of omental nodules. 5.No significant change in multiple hypodense liver lesions compatible with metastatic disease Electronically Signed: Harvey Alvarez MD  2/26/2025 9:50 AM EST  Workstation ID: [id number]    CT Abdomen Pelvis With & Without Contrast    Result Date: 12/30/2024  Impression: Primary pancreatic head neoplasm is grossly similar in size. Grossly similar innumerable hepatic metastases, omental nodularity, and mesenteric lymphadenopathy. Slight interval recanalization of the previously occluded portosplenomesenteric venous confluence. New small volume free fluid. Electronically Signed: Merrill Duncan MD  12/30/2024 6:17 PM EST  Workstation ID: [id number]        Assessment/Plan    1.  Stage IV adenocarcinoma of the pancreas.   her tumor markers have improved .  I reviewed the scans with her and her  today.  I do not see any sign of progressive disease.  Mostly stable findings though tumor markers and stable findings are reasonable cigarette of disease response.  For now I plan to continue treatment with gemcitabine and Abraxane with no changes.    2.  Heartburn secondary to chemotherapy.  on PPI and baking soda with water.      3.  Constipation.  Improve constipation with MiraLAX twice a day along with stool softener.        Total time of patient care on day of service including time prior to, face to face with patient, and following visit spent in reviewing records, lab results, imaging studies, discussion with patient, and documentation/charting was > 45 minutes.     Ni Jackson MD  [facility] Hematology and Oncology    Return on: 03/27/25  Return in  (Approximately): 1 month, Schedule with next infusion    Orders Placed This Encounter   Procedures    Cancer Antigen 19-9    Comprehensive metabolic panel    Comprehensive metabolic panel    CBC and Differential    CBC and Differential       2/27/2025     Future Appointments         Provider Department Center    3/11/2025 2:30 PM ACCESS AND LAB DRAW CHAIR 1 [facility] OUTPATIENT ONCOLOGY TC    3/13/2025 12:30 PM ROOM E [facility] OUTPATIENT ONCOLOGY TC    3/17/2025 2:00 PM Michelle Arroyo PA-C [facility] PALLIATIVE CARE CT    3/25/2025 2:30 PM ACCESS AND LAB DRAW CHAIR 1 [facility] OUTPATIENT ONCOLOGY TC    3/27/2025 11:00 AM (Arrive by 10:45 AM) Ni Jackson MD [facility] HEMATOLOGY & ONCOLOGY TC    3/27/2025 11:30 AM ROOM A [facility] OUTPATIENT ONCOLOGY TC

## 2025-03-06 ENCOUNTER — LAB (OUTPATIENT)
Dept: LAB | Facility: HOSPITAL | Age: 75
End: 2025-03-06
Payer: MEDICARE

## 2025-03-06 ENCOUNTER — HOSPITAL ENCOUNTER (EMERGENCY)
Facility: HOSPITAL | Age: 75
Discharge: HOME | End: 2025-03-06
Attending: EMERGENCY MEDICINE
Payer: MEDICARE

## 2025-03-06 ENCOUNTER — PATIENT OUTREACH (OUTPATIENT)
Dept: OTHER | Facility: HOSPITAL | Age: 75
End: 2025-03-06
Payer: MEDICARE

## 2025-03-06 ENCOUNTER — APPOINTMENT (OUTPATIENT)
Dept: CT IMAGING | Facility: HOSPITAL | Age: 75
End: 2025-03-06
Payer: MEDICARE

## 2025-03-06 ENCOUNTER — OFFICE VISIT (OUTPATIENT)
Dept: PALLIATIVE CARE | Facility: CLINIC | Age: 75
End: 2025-03-06
Payer: MEDICARE

## 2025-03-06 VITALS
TEMPERATURE: 98.3 F | DIASTOLIC BLOOD PRESSURE: 78 MMHG | RESPIRATION RATE: 16 BRPM | HEART RATE: 93 BPM | OXYGEN SATURATION: 99 % | SYSTOLIC BLOOD PRESSURE: 157 MMHG | BODY MASS INDEX: 22.08 KG/M2 | WEIGHT: 120 LBS | HEIGHT: 62 IN

## 2025-03-06 VITALS
OXYGEN SATURATION: 98 % | WEIGHT: 105.3 LBS | SYSTOLIC BLOOD PRESSURE: 150 MMHG | HEART RATE: 78 BPM | BODY MASS INDEX: 19.26 KG/M2 | DIASTOLIC BLOOD PRESSURE: 70 MMHG | RESPIRATION RATE: 18 BRPM | TEMPERATURE: 98 F

## 2025-03-06 DIAGNOSIS — G89.3 CANCER RELATED PAIN: ICD-10-CM

## 2025-03-06 DIAGNOSIS — R15.9 INCONTINENCE OF FECES, UNSPECIFIED FECAL INCONTINENCE TYPE: ICD-10-CM

## 2025-03-06 DIAGNOSIS — Z51.81 THERAPEUTIC DRUG MONITORING: ICD-10-CM

## 2025-03-06 DIAGNOSIS — K62.5 BRIGHT RED BLOOD PER RECTUM: ICD-10-CM

## 2025-03-06 DIAGNOSIS — C25.0 MALIGNANT TUMOR OF HEAD OF PANCREAS: Primary | ICD-10-CM

## 2025-03-06 DIAGNOSIS — C25.0 MALIGNANT NEOPLASM OF HEAD OF PANCREAS: Primary | ICD-10-CM

## 2025-03-06 DIAGNOSIS — G62.0 CHEMOTHERAPY-INDUCED NEUROPATHY: ICD-10-CM

## 2025-03-06 DIAGNOSIS — S01.81XA FACIAL LACERATION, INITIAL ENCOUNTER: ICD-10-CM

## 2025-03-06 DIAGNOSIS — T45.1X5A CHEMOTHERAPY-INDUCED NEUROPATHY: ICD-10-CM

## 2025-03-06 DIAGNOSIS — J34.89 RHINORRHEA: ICD-10-CM

## 2025-03-06 DIAGNOSIS — S09.90XA INJURY OF HEAD, INITIAL ENCOUNTER: Primary | ICD-10-CM

## 2025-03-06 LAB
AMPHET+METHAMPHET UR QL: NEGATIVE
AMPHETAMINES UR QL: NEGATIVE
ANION GAP SERPL CALCULATED.3IONS-SCNC: 11 MMOL/L (ref 5–15)
ANISOCYTOSIS BLD QL: NORMAL
BARBITURATES UR QL SCN: NEGATIVE
BASOPHILS # BLD AUTO: 0.07 10*3/MM3 (ref 0–0.2)
BASOPHILS NFR BLD AUTO: 0.8 % (ref 0–1.5)
BENZODIAZ UR QL SCN: NEGATIVE
BUN SERPL-MCNC: 14 MG/DL (ref 8–23)
BUN/CREAT SERPL: 25 (ref 7–25)
BUPRENORPHINE SERPL-MCNC: NEGATIVE NG/ML
CALCIUM SPEC-SCNC: 9.3 MG/DL (ref 8.6–10.5)
CANNABINOIDS SERPL QL: NEGATIVE
CHLORIDE SERPL-SCNC: 101 MMOL/L (ref 98–107)
CO2 SERPL-SCNC: 20 MMOL/L (ref 22–29)
COCAINE UR QL: NEGATIVE
CREAT SERPL-MCNC: 0.56 MG/DL (ref 0.57–1)
DEPRECATED RDW RBC AUTO: 66.4 FL (ref 37–54)
EGFRCR SERPLBLD CKD-EPI 2021: 94.7 ML/MIN/1.73
EOSINOPHIL # BLD AUTO: 0.03 10*3/MM3 (ref 0–0.4)
EOSINOPHIL NFR BLD AUTO: 0.4 % (ref 0.3–6.2)
ERYTHROCYTE [DISTWIDTH] IN BLOOD BY AUTOMATED COUNT: 19.5 % (ref 12.3–15.4)
FENTANYL UR-MCNC: NEGATIVE NG/ML
GLUCOSE SERPL-MCNC: 90 MG/DL (ref 65–99)
HCT VFR BLD AUTO: 31.7 % (ref 34–46.6)
HGB BLD-MCNC: 10.4 G/DL (ref 12–15.9)
IMM GRANULOCYTES # BLD AUTO: 0.27 10*3/MM3 (ref 0–0.05)
IMM GRANULOCYTES NFR BLD AUTO: 3.2 % (ref 0–0.5)
LYMPHOCYTES # BLD AUTO: 1.66 10*3/MM3 (ref 0.7–3.1)
LYMPHOCYTES NFR BLD AUTO: 19.6 % (ref 19.6–45.3)
MCH RBC QN AUTO: 30.5 PG (ref 26.6–33)
MCHC RBC AUTO-ENTMCNC: 32.8 G/DL (ref 31.5–35.7)
MCV RBC AUTO: 93 FL (ref 79–97)
METHADONE UR QL SCN: NEGATIVE
MONOCYTES # BLD AUTO: 0.63 10*3/MM3 (ref 0.1–0.9)
MONOCYTES NFR BLD AUTO: 7.4 % (ref 5–12)
NEUTROPHILS NFR BLD AUTO: 5.81 10*3/MM3 (ref 1.7–7)
NEUTROPHILS NFR BLD AUTO: 68.6 % (ref 42.7–76)
NRBC BLD AUTO-RTO: 0.2 /100 WBC (ref 0–0.2)
OPIATES UR QL: NEGATIVE
OXYCODONE UR QL SCN: POSITIVE
PCP UR QL SCN: NEGATIVE
PLAT MORPH BLD: NORMAL
PLATELET # BLD AUTO: 167 10*3/MM3 (ref 140–450)
PMV BLD AUTO: 9.3 FL (ref 6–12)
POTASSIUM SERPL-SCNC: 4.4 MMOL/L (ref 3.5–5.2)
RBC # BLD AUTO: 3.41 10*6/MM3 (ref 3.77–5.28)
SODIUM SERPL-SCNC: 132 MMOL/L (ref 136–145)
TRICYCLICS UR QL SCN: NEGATIVE
WBC MORPH BLD: NORMAL
WBC NRBC COR # BLD AUTO: 8.47 10*3/MM3 (ref 3.4–10.8)

## 2025-03-06 PROCEDURE — 80048 BASIC METABOLIC PNL TOTAL CA: CPT | Performed by: NURSE PRACTITIONER

## 2025-03-06 PROCEDURE — 25010000002 TETANUS-DIPHTH-ACELL PERTUSSIS 5-2.5-18.5 LF-MCG/0.5 SUSPENSION PREFILLED SYRINGE: Performed by: NURSE PRACTITIONER

## 2025-03-06 PROCEDURE — 85007 BL SMEAR W/DIFF WBC COUNT: CPT | Performed by: NURSE PRACTITIONER

## 2025-03-06 PROCEDURE — 70450 CT HEAD/BRAIN W/O DYE: CPT

## 2025-03-06 PROCEDURE — 99285 EMERGENCY DEPT VISIT HI MDM: CPT

## 2025-03-06 PROCEDURE — 36415 COLL VENOUS BLD VENIPUNCTURE: CPT

## 2025-03-06 PROCEDURE — 90715 TDAP VACCINE 7 YRS/> IM: CPT | Performed by: NURSE PRACTITIONER

## 2025-03-06 PROCEDURE — 90471 IMMUNIZATION ADMIN: CPT | Performed by: NURSE PRACTITIONER

## 2025-03-06 PROCEDURE — 85025 COMPLETE CBC W/AUTO DIFF WBC: CPT | Performed by: NURSE PRACTITIONER

## 2025-03-06 PROCEDURE — 80307 DRUG TEST PRSMV CHEM ANLYZR: CPT

## 2025-03-06 PROCEDURE — 70486 CT MAXILLOFACIAL W/O DYE: CPT

## 2025-03-06 RX ORDER — PACLITAXEL 100 MG/20ML
125 INJECTION, POWDER, LYOPHILIZED, FOR SUSPENSION INTRAVENOUS ONCE
OUTPATIENT
Start: 2025-04-24

## 2025-03-06 RX ORDER — GABAPENTIN 300 MG/1
300 CAPSULE ORAL 3 TIMES DAILY
Qty: 90 CAPSULE | Refills: 0 | Status: SHIPPED | OUTPATIENT
Start: 2025-03-06

## 2025-03-06 RX ORDER — OXYCODONE HYDROCHLORIDE 10 MG/1
10 TABLET ORAL EVERY 6 HOURS PRN
Qty: 120 TABLET | Refills: 0 | Status: SHIPPED | OUTPATIENT
Start: 2025-03-12 | End: 2025-04-11

## 2025-03-06 RX ORDER — SODIUM CHLORIDE 9 MG/ML
20 INJECTION, SOLUTION INTRAVENOUS ONCE
OUTPATIENT
Start: 2025-05-08

## 2025-03-06 RX ORDER — SODIUM CHLORIDE 9 MG/ML
20 INJECTION, SOLUTION INTRAVENOUS ONCE
OUTPATIENT
Start: 2025-04-24

## 2025-03-06 RX ORDER — PACLITAXEL 100 MG/20ML
125 INJECTION, POWDER, LYOPHILIZED, FOR SUSPENSION INTRAVENOUS ONCE
OUTPATIENT
Start: 2025-05-08

## 2025-03-06 RX ADMIN — TETANUS TOXOID, REDUCED DIPHTHERIA TOXOID AND ACELLULAR PERTUSSIS VACCINE, ADSORBED 0.5 ML: 5; 2.5; 8; 8; 2.5 SUSPENSION INTRAMUSCULAR at 17:01

## 2025-03-06 RX ADMIN — Medication 3 ML: at 17:01

## 2025-03-06 NOTE — PROGRESS NOTES
Palliative Clinic Note      Name: Claudia Flores  Age: 74 y.o.  Sex: female  : 1950  MRN: [id number]  Date of Service: 2025   Medical Oncologist: Dr. Jackson     Subjective:    Chief Complaint: Irregular bowel movements, neuropathy    History of Present Illness: Claudia Flores is a 74 y.o. female with past medical history significant for pancreatic cancer, hypertension, hypothyroidism, hyperlipidemia  who presents to the palliative clinic today as a follow up for pain and symptom management.     Treatment summary: The patient presented with unintentional weight loss. Imaging revealed a pancreatic head mass along with findings suspicious for metastatic disease in the lung and liver. Patient started chemotherapy with gemcitabine with Abraxane in 10/2024. Scans in 2025 were stable.     Pain: The patient c/o abdominal pain controlled with oxycodone 10 mg q6h PRN. She denies significant side effects from the opioid therapy but admits to some constipation. The patient was started on gabapentin 100 mg TID at her last appointment for neuropathy. The patient has not noticed any improvement since starting this medication. She denies side effects.     Other symptoms: The patient reports improved appetite since starting Megace. The patient c/o fatigue worse for 2-3 days after chemotherapy. The patient often takes a nap in the afternoon. She sleeps well most nights. The patient denies nausea or vomiting. She manages opioid induced constipation with stool softener as needed. The patient reports several weeks of intermittent bright red blood per rectum. As well as, loss of sphincter tone. She reports several episodes of fecal incontinence unrelated to loose stools. The patient complains of a runny nose since starting treatment. She would like to know what she can take for this.     Psychosocial: The patient lives with her . They have 1 daughter and 2 grand children that live nearby. The patient is  a retired teacher. She enjoys traveling. The is a member at [employer]. No allergies. She admits to smoking 1/2 PPD. She reports rare alcohol use and no illicit drug use. No history of mental illness. Patient denies anxiety or depression.      Spiritual: Mandaen.      Goals: Maximize comfort, optimize function & psychosocial wellbeing, and promote advanced care planning.    The following portions of the patient's history were reviewed and updated as appropriate: allergies, current medications, past family history, past medical history, past social history, past surgical history and problem list.    ORT-R: Low risk  Decisional capacity: Full  ECOG: (1) Restricted in physically strenuous activity, ambulatory and able to do work of light nature     Objective:    /70   Pulse 78   Temp 98 °F (36.7 °C) (Temporal)   Resp 18   Wt 47.8 kg (105 lb 4.8 oz)   SpO2 98%   BMI 19.26 kg/m²     Constitutional: Awake, alert, normal gait, sitting up in exam chair, in no acute distress  Eyes: PERRLA, EOMS intact  HENT: NCAT, face symmetric  Neck: Supple, trachea midline  Respiratory: Nonlabored respirations  Cardiovascular: RRR, no edema observed  Gastrointestinal: Soft, no guarding  Musculoskeletal: Moves all extremities   Psychiatric: Appropriate affect, cooperative  Neurologic: Oriented x 3, Cranial Nerves grossly intact to confrontation, speech clear  Skin: Cool dry, no rashes or wounds appreciated     Medication Counts: Reviewed. See bottom of note for details. Brought medication.  No overuse or misuse evident.  I have reviewed the patient's [place] PDMP. LETICIA Req #[id number].   UDS: Need to complete    Assessment & Plan:    1. Malignant neoplasm of head of pancreas  - Patient started chemotherapy with gemcitabine with Abraxane in 10/2024. Scans in 2/2025 were stable.    2. Therapeutic drug monitoring  - UDS needed for new patient protocol. Patient will complete at her convenience.     3. Cancer related pain  -  Patient is appropriate for opioid therapy due to cancer related pain. Daily function and quality of life improved with pain medication. Refill for oxycodone 10 mg tablets q6h PRN #120 was sent to the pharmacy. Side effects of the medication discussed at every visit. Patient was encouraged to continue bowel regimen of daily stool softeners, prn laxatives, and diet modifications.    4. Chemotherapy-induced neuropathy  - Increase gabapentin (NEURONTIN) 300 MG capsule; Take 1 capsule by mouth 3 (Three) Times a Day.  Dispense: 90 capsule; Refill: 0    5. Bright red blood per rectum  6. Incontinence of feces, unspecified fecal incontinence type  - Ambulatory Referral to Colorectal Surgery    7. Rhinorrhea  - Recommend trial of OTC antihistamine and/or intranasal steroids spray.     Advanced directives: Yes, Living Will     Return in about 3 months (around 6/6/2025) for Office Visit.    I spent 40 minutes caring for Claudia Flores on this date of service. This time includes time spent by me in the following activities: preparing for the visit, reviewing tests, obtaining and/or reviewing a separately obtained history, performing a medically appropriate examination and/or evaluation , counseling and educating the patient/family/caregiver, ordering medications, tests, or procedures, documenting information in the medical record, independently interpreting results and communicating that information with the patient/family/caregiver, and care coordination    Michelle Arroyo PA-C  03/06/2025    Medication Date Filled # Filled Count Used # Days  ISAC   Gabapentin 100 2/18/25 90 -- -- -- --   Oxycodone 10 2/10/25 120 40 80 24 3

## 2025-03-06 NOTE — TELEPHONE ENCOUNTER
I have reviewed patient's LETICIA report prior to prescribing Schedule II, III, and IV medications. Request # [id number] . Next refill for oxycodone 10 mg tab q6h PRN #120 was sent to the pharmacy. The patient is scheduled to follow-up in 3 months.

## 2025-03-06 NOTE — DISCHARGE INSTRUCTIONS
Sutures out in 7 days.  Watch for signs of infection.    Keep wound clean.  No ointments.    Read Over head injury instructions.

## 2025-03-06 NOTE — ED PROVIDER NOTES
EMERGENCY DEPARTMENT ENCOUNTER    Pt Name: lCaudia Flores  MRN: [id number]  Pt :   1950  Room Number:  VR02/V2  Date of encounter:  3/6/2025  PCP: Jeannie Salgado MD  ED Provider: GERDA Lynn    Historian: Patient    HPI:  Chief Complaint:  Head injury    Context: Claudia Flores is a 74 y.o. female who presents to the ED c/o Head injury.  Pt went to her palliative care appt this afternoon.  Pt drove herself to the appointment.  Pt was leaving the appt and lost her balance.  Pt hit her head, pt has a small laceration above her Rt eyebrow. Pt reports that she did not loss consciousness. Pt advises that she does not remember much about the incident. Pt denies any visual changes, denies dizziness.   HPI     REVIEW OF SYSTEMS  A chief complaint appropriate review of systems was completed and is negative except as noted in the HPI.     PAST MEDICAL HISTORY  Past Medical History:   Diagnosis Date    Cancer     skin     Disease of thyroid gland     Hyperlipidemia     Hypertension     Pancreatic mass        PAST SURGICAL HISTORY  Past Surgical History:   Procedure Laterality Date    APPENDECTOMY  2000    TONSILLECTOMY  [address] [place] [address]    TOTAL HIP ARTHROPLASTY Left 2024    VENOUS ACCESS DEVICE (PORT) INSERTION Right 10/07/2024       FAMILY HISTORY  Family History   Problem Relation Age of Onset    Hypertension Mother     Heart disease Father     Parkinsonism Father     Breast cancer Sister 50    Ovarian cancer Maternal Grandmother 60       SOCIAL HISTORY  Social History     Socioeconomic History    Marital status:    Tobacco Use    Smoking status: Every Day     Current packs/day: 1.00     Types: Cigarettes    Smokeless tobacco: Never   Vaping Use    Vaping status: Never Used   Substance and Sexual Activity    Alcohol use: Not Currently     Comment: 2 glasses of wine daily    Drug use: Never    Sexual activity: Defer       ALLERGIES  Patient has no known allergies.    PHYSICAL  EXAM  Physical Exam  Vitals and nursing note reviewed.   Constitutional:       General: She is not in acute distress.     Appearance: Normal appearance. She is not ill-appearing or toxic-appearing.   HENT:      Head: Normocephalic.        Right Ear: Tympanic membrane normal.      Left Ear: Tympanic membrane normal.      Nose: Nose normal.      Mouth/Throat:      Mouth: Mucous membranes are moist.   Eyes:      Extraocular Movements: Extraocular movements intact.      Conjunctiva/sclera: Conjunctivae normal.      Pupils: Pupils are equal, round, and reactive to light.      Comments: No eye entrapment   Cardiovascular:      Rate and Rhythm: Normal rate and regular rhythm.      Heart sounds: Normal heart sounds.   Pulmonary:      Effort: Pulmonary effort is normal.      Breath sounds: Normal breath sounds.   Musculoskeletal:         General: Normal range of motion.      Cervical back: Normal range of motion and neck supple. No tenderness.      Thoracic back: Normal. No tenderness. Normal range of motion.      Lumbar back: Normal. No tenderness. Normal range of motion.   Skin:     General: Skin is warm and dry.   Neurological:      General: No focal deficit present.      Mental Status: She is alert and oriented to person, place, and time.   Psychiatric:         Mood and Affect: Mood normal.         Behavior: Behavior normal.           LAB RESULTS  Results for orders placed or performed during the hospital encounter of 03/06/25   Basic Metabolic Panel    Collection Time: 03/06/25  4:53 PM    Specimen: Blood   Result Value Ref Range    Glucose 90 65 - 99 mg/dL    BUN 14 8 - 23 mg/dL    Creatinine 0.56 (L) 0.57 - 1.00 mg/dL    Sodium 132 (L) 136 - 145 mmol/L    Potassium 4.4 3.5 - 5.2 mmol/L    Chloride 101 98 - 107 mmol/L    CO2 20.0 (L) 22.0 - 29.0 mmol/L    Calcium 9.3 8.6 - 10.5 mg/dL    BUN/Creatinine Ratio 25.0 7.0 - 25.0    Anion Gap 11.0 5.0 - 15.0 mmol/L    eGFR 94.7 >60.0 mL/min/1.73   CBC Auto Differential     Collection Time: 03/06/25  4:53 PM    Specimen: Blood   Result Value Ref Range    WBC 8.47 3.40 - 10.80 10*3/mm3    RBC 3.41 (L) 3.77 - 5.28 10*6/mm3    Hemoglobin 10.4 (L) 12.0 - 15.9 g/dL    Hematocrit 31.7 (L) 34.0 - 46.6 %    MCV 93.0 79.0 - 97.0 fL    MCH 30.5 26.6 - 33.0 pg    MCHC 32.8 31.5 - 35.7 g/dL    RDW 19.5 (H) 12.3 - 15.4 %    RDW-SD 66.4 (H) 37.0 - 54.0 fl    MPV 9.3 6.0 - 12.0 fL    Platelets 167 140 - 450 10*3/mm3    Neutrophil % 68.6 42.7 - 76.0 %    Lymphocyte % 19.6 19.6 - 45.3 %    Monocyte % 7.4 5.0 - 12.0 %    Eosinophil % 0.4 0.3 - 6.2 %    Basophil % 0.8 0.0 - 1.5 %    Immature Grans % 3.2 (H) 0.0 - 0.5 %    Neutrophils, Absolute 5.81 1.70 - 7.00 10*3/mm3    Lymphocytes, Absolute 1.66 0.70 - 3.10 10*3/mm3    Monocytes, Absolute 0.63 0.10 - 0.90 10*3/mm3    Eosinophils, Absolute 0.03 0.00 - 0.40 10*3/mm3    Basophils, Absolute 0.07 0.00 - 0.20 10*3/mm3    Immature Grans, Absolute 0.27 (H) 0.00 - 0.05 10*3/mm3    nRBC 0.2 0.0 - 0.2 /100 WBC   Scan Slide    Collection Time: 03/06/25  4:53 PM    Specimen: Blood   Result Value Ref Range    Anisocytosis Mod/2+ None Seen    WBC Morphology Normal Normal    Platelet Morphology Normal Normal       If labs were ordered, I independently reviewed the results and considered them in treating the patient.    RADIOLOGY  CT Head Without Contrast   Final Result   Impression:   1.No CT findings of acute intracranial abnormality.   2.Laceration at the superior-lateral right orbital rim with mild surrounding soft tissue prominence.   3.No definite findings of acute facial bone fracture.   4.Partial opacification of the inferior right mastoid air cells, nonspecific.   5.Advanced degenerative changes in the visualized upper cervical spine.                  Electronically Signed: Oli Smith     3/6/2025 4:28 PM EST     Workstation ID: [id number]      CT Facial Bones Without Contrast   Final Result   Impression:   1.No CT findings of acute intracranial abnormality.    2.Laceration at the superior-lateral right orbital rim with mild surrounding soft tissue prominence.   3.No definite findings of acute facial bone fracture.   4.Partial opacification of the inferior right mastoid air cells, nonspecific.   5.Advanced degenerative changes in the visualized upper cervical spine.                  Electronically Signed: Oli Smith     3/6/2025 4:28 PM EST     Workstation ID: [id number]        [] Radiologist's Report Reviewed:  I ordered and independently interpreted the above noted radiographic studies.  See radiologist's dictation for official interpretation.      PROCEDURES    Laceration Repair    Date/Time: 3/6/2025 5:30 PM    Performed by: Kathya Bates APRN  Authorized by: Betito Grigsby MD    Consent:     Consent obtained:  Verbal    Consent given by:  Patient    Risks discussed:  Pain, poor cosmetic result and poor wound healing    Alternatives discussed:  No treatment  Universal protocol:     Procedure explained and questions answered to patient or proxy's satisfaction: yes      Patient identity confirmed:  Verbally with patient  Anesthesia:     Anesthesia method:  Topical application    Topical anesthetic:  LET  Laceration details:     Location:  Face    Facial location: Rt frontal region.    Wound length (cm): 1.  Pre-procedure details:     Preparation:  Patient was prepped and draped in usual sterile fashion  Exploration:     Hemostasis achieved with:  Direct pressure and LET    Imaging obtained comment:  Ct facial/ ct head    Contaminated: no    Treatment:     Area cleansed with:  Povidone-iodine    Amount of cleaning:  Standard  Skin repair:     Repair method:  Sutures    Suture size:  5-0    Suture material:  Prolene    Suture technique:  Simple interrupted    Number of sutures:  3  Approximation:     Approximation:  Close  Repair type:     Repair type:  Simple  Post-procedure details:     Dressing:  Open (no dressing)    Procedure completion:  Tolerated      No  orders to display       MEDICATIONS GIVEN IN ER    Medications   Lido-EPINEPHrine-Tetracaine 4-0.18-0.5 % gel 3 mL (3 mL Topical Not Given 3/6/25 1701)   Tetanus-Diphth-Acell Pertussis (BOOSTRIX) injection 0.5 mL (0.5 mL Intramuscular Given 3/6/25 1701)   Lido-EPINEPHrine-Tetracaine 4-0.18-0.5 % gel 3 mL (3 mL Topical Given 3/6/25 1701)       MEDICAL DECISION MAKING, PROGRESS, and CONSULTS   Medical Decision Making  Claudia Flores is a 74 y.o. female who presents to the ED c/o Head injury.  Pt went to her palliative care appt this afternoon.  Pt drove herself to the appointment.  Pt was leaving the appt and lost her balance.  Pt hit her head, pt has a small laceration above her Rt eyebrow. Pt reports that she did not loss consciousness. Pt advises that she does not remember much about the incident. Pt denies any visual changes, denies dizziness.       Amount and/or Complexity of Data Reviewed  Labs: ordered. Decision-making details documented in ED Course.  Radiology: ordered.    Risk  Prescription drug management.        Discussion below represents my analysis of pertinent findings related to patient's condition, differential diagnosis, treatment plan and final disposition.    Differential diagnosis: Fracture, head injury, laceration, intracranial abnormality  Additional differential diagnosis include but are not limited to:     Additional sources  Discussed/ obtained information from independent historians:   [] Spouse  [] Parent  [] Family member  [] Friend  [] EMS   [] Other:  External (non-ED) record review:   [] Inpatient record:   [] Office record:   [x] Outpatient record:   [x] Prior Outpatient labs:   [x] Prior Outpatient radiology:   [] Primary Care record:   [] Outside ED record:   [] Other:   Patient's care impacted by:   [] Diabetes  [] Hypertension  [] Hyperlipidemia  [] Hypothyroidism   [] Coronary Artery Disease  [] Congestive Heart Failure   [] COPD   [x] Cancer   [] Obesity  [] GERD   [] Tobacco  Abuse   [] Substance Abuse    [] Anxiety   [] Depression   [] Other:   Care significantly affected by Social Determinants of Health (housing and economic circumstances, unemployment)    [] Yes     [x] No   If yes, Patient's care significantly limited by  Social Determinants of Health including:   [] Inadequate housing   [] Low income   [] Alcoholism and drug addiction in family   [] Problems related to primary support group   [] Unemployment   [] Problems related to employment   [] Other Social Determinants of Health:     Orders placed during this visit:  Orders Placed This Encounter   Procedures    Laceration Repair    CT Head Without Contrast    CT Facial Bones Without Contrast    Basic Metabolic Panel    CBC Auto Differential    Scan Slide    Supplies To Bedside - Notify MD When Ready- Suture Tray / Cart; Sterile Gloves: 7; Suture Required: Prolene; Size: 5.0    CBC & Differential       I considered prescription management  with:   [] Pain medication  [] Antiviral  [] Antibiotic   [] Other:   Rationale:  Additional orders considered but not ordered:  The following testing was considered but ultimately not selected after discussion with patient/family:  ED Course:    ED Course as of 03/06/25 1734   Thu Mar 06, 2025   1636 CT of the head and facial bones performed negative for intracranial abnormality, facial fracture. [KG]   1729 WBC: 8.47  Normal white blood cell count. [KG]   1729 Hemoglobin(!): 10.4  Anemia.  Patient has history of anemia. [KG]   1729 Hematocrit(!): 31.7 [KG]   1729 Creatinine(!): 0.56 [KG]   1729 Sodium(!): 132 [KG]   1729 BUN: 14 [KG]   1729 Glucose: 90 [KG]      ED Course User Index  [KG] Kathya Bates, APRN            DIAGNOSIS  Final diagnoses:   Injury of head, initial encounter   Facial laceration, initial encounter       DISPOSITION    DISCHARGE    Patient discharged in stable condition.    Reviewed implications of results, diagnosis, meds, responsibility to follow up, warning  signs and symptoms of possible worsening, potential complications and reasons to return to ER.    Patient/Family voiced understanding of above instructions.    Discussed plan for discharge, as there is no emergent indication for admission.  Pt/family is agreeable and understands need for follow up and possible repeat testing.  Pt/family is aware that discharge does not mean that nothing is wrong but that it indicates no emergency is currently present that requires admission and they must continue care with follow-up as given below or with a physician of their choice.     FOLLOW-UP  Jeannie Salgado MD  [address]  [address]  [facility] [address]  [phone]          [facility] EMERGENCY DEPARTMENT  [address] [facility] [id number] [phone]  In 1 week  For suture removal         Medication List        Changed      oxyCODONE 10 MG tablet  Commonly known as: ROXICODONE  Take 1 tablet by mouth Every 6 (Six) Hours As Needed for Moderate Pain or Severe Pain for up to 30 days.  Start taking on: March 12, 2025  What changed:   when to take this  reasons to take this  These instructions start on March 12, 2025. If you are unsure what to do until then, ask your doctor or other care provider.               Where to Get Your Medications        These medications were sent to [facility] [id number] - [place] [id number] [facility] - [phone]  - [phone] [address] [address]      Phone: [phone]   oxyCODONE 10 MG tablet          ED Disposition       ED Disposition   Discharge    Condition   Stable    Comment   --               Please note that portions of this document were completed with voice recognition software.       Kathya Bates, APRN  03/06/25 [id number]

## 2025-03-07 ENCOUNTER — PATIENT OUTREACH (OUTPATIENT)
Dept: OTHER | Facility: HOSPITAL | Age: 75
End: 2025-03-07
Payer: MEDICARE

## 2025-03-11 ENCOUNTER — HOSPITAL ENCOUNTER (OUTPATIENT)
Dept: ONCOLOGY | Facility: HOSPITAL | Age: 75
Discharge: HOME | End: 2025-03-11
Admitting: INTERNAL MEDICINE
Payer: MEDICARE

## 2025-03-11 VITALS
DIASTOLIC BLOOD PRESSURE: 77 MMHG | TEMPERATURE: 97.3 F | RESPIRATION RATE: 18 BRPM | SYSTOLIC BLOOD PRESSURE: 117 MMHG | HEART RATE: 79 BPM | HEIGHT: 62 IN | BODY MASS INDEX: 19.88 KG/M2 | WEIGHT: 108 LBS

## 2025-03-11 DIAGNOSIS — Z45.2 ENCOUNTER FOR CARE RELATED TO PORT-A-CATH: Primary | ICD-10-CM

## 2025-03-11 DIAGNOSIS — C25.0 MALIGNANT TUMOR OF HEAD OF PANCREAS: ICD-10-CM

## 2025-03-11 LAB
ALBUMIN SERPL-MCNC: 3.6 G/DL (ref 3.5–5.2)
ALBUMIN/GLOB SERPL: 1.3 G/DL
ALP SERPL-CCNC: 193 U/L (ref 39–117)
ALT SERPL W P-5'-P-CCNC: 12 U/L (ref 1–33)
ANION GAP SERPL CALCULATED.3IONS-SCNC: 11 MMOL/L (ref 5–15)
AST SERPL-CCNC: 31 U/L (ref 1–32)
BASOPHILS # BLD AUTO: 0.04 10*3/MM3 (ref 0–0.2)
BASOPHILS NFR BLD AUTO: 0.5 % (ref 0–1.5)
BILIRUB SERPL-MCNC: 0.2 MG/DL (ref 0–1.2)
BUN SERPL-MCNC: 14 MG/DL (ref 8–23)
BUN/CREAT SERPL: 26.4 (ref 7–25)
CALCIUM SPEC-SCNC: 8.5 MG/DL (ref 8.6–10.5)
CHLORIDE SERPL-SCNC: 101 MMOL/L (ref 98–107)
CO2 SERPL-SCNC: 21 MMOL/L (ref 22–29)
CREAT SERPL-MCNC: 0.53 MG/DL (ref 0.57–1)
DEPRECATED RDW RBC AUTO: 69.2 FL (ref 37–54)
EGFRCR SERPLBLD CKD-EPI 2021: 96 ML/MIN/1.73
EOSINOPHIL # BLD AUTO: 0.16 10*3/MM3 (ref 0–0.4)
EOSINOPHIL NFR BLD AUTO: 2 % (ref 0.3–6.2)
ERYTHROCYTE [DISTWIDTH] IN BLOOD BY AUTOMATED COUNT: 19.9 % (ref 12.3–15.4)
GLOBULIN UR ELPH-MCNC: 2.7 GM/DL
GLUCOSE SERPL-MCNC: 97 MG/DL (ref 65–99)
HCT VFR BLD AUTO: 29.9 % (ref 34–46.6)
HGB BLD-MCNC: 9.6 G/DL (ref 12–15.9)
IMM GRANULOCYTES # BLD AUTO: 0.15 10*3/MM3 (ref 0–0.05)
IMM GRANULOCYTES NFR BLD AUTO: 1.9 % (ref 0–0.5)
LYMPHOCYTES # BLD AUTO: 1.38 10*3/MM3 (ref 0.7–3.1)
LYMPHOCYTES NFR BLD AUTO: 17.6 % (ref 19.6–45.3)
MCH RBC QN AUTO: 30.9 PG (ref 26.6–33)
MCHC RBC AUTO-ENTMCNC: 32.1 G/DL (ref 31.5–35.7)
MCV RBC AUTO: 96.1 FL (ref 79–97)
MONOCYTES # BLD AUTO: 1.35 10*3/MM3 (ref 0.1–0.9)
MONOCYTES NFR BLD AUTO: 17.2 % (ref 5–12)
NEUTROPHILS NFR BLD AUTO: 4.75 10*3/MM3 (ref 1.7–7)
NEUTROPHILS NFR BLD AUTO: 60.8 % (ref 42.7–76)
PLATELET # BLD AUTO: 181 10*3/MM3 (ref 140–450)
PMV BLD AUTO: 9.4 FL (ref 6–12)
POTASSIUM SERPL-SCNC: 4.6 MMOL/L (ref 3.5–5.2)
PROT SERPL-MCNC: 6.3 G/DL (ref 6–8.5)
RBC # BLD AUTO: 3.11 10*6/MM3 (ref 3.77–5.28)
SODIUM SERPL-SCNC: 133 MMOL/L (ref 136–145)
WBC NRBC COR # BLD AUTO: 7.83 10*3/MM3 (ref 3.4–10.8)

## 2025-03-11 PROCEDURE — 85025 COMPLETE CBC W/AUTO DIFF WBC: CPT | Performed by: INTERNAL MEDICINE

## 2025-03-11 PROCEDURE — 80053 COMPREHEN METABOLIC PANEL: CPT | Performed by: INTERNAL MEDICINE

## 2025-03-11 PROCEDURE — 25010000002 HEPARIN LOCK FLUSH PER 10 UNITS: Performed by: INTERNAL MEDICINE

## 2025-03-11 PROCEDURE — 36591 DRAW BLOOD OFF VENOUS DEVICE: CPT

## 2025-03-11 RX ORDER — HEPARIN SODIUM (PORCINE) LOCK FLUSH IV SOLN 100 UNIT/ML 100 UNIT/ML
500 SOLUTION INTRAVENOUS AS NEEDED
Status: DISCONTINUED | OUTPATIENT
Start: 2025-03-11 | End: 2025-03-12 | Stop reason: HOSPADM

## 2025-03-11 RX ORDER — SODIUM CHLORIDE 0.9 % (FLUSH) 0.9 %
10 SYRINGE (ML) INJECTION AS NEEDED
OUTPATIENT
Start: 2025-03-11

## 2025-03-11 RX ORDER — SODIUM CHLORIDE 0.9 % (FLUSH) 0.9 %
20 SYRINGE (ML) INJECTION AS NEEDED
OUTPATIENT
Start: 2025-03-11

## 2025-03-11 RX ORDER — SODIUM CHLORIDE 0.9 % (FLUSH) 0.9 %
10 SYRINGE (ML) INJECTION AS NEEDED
Status: DISCONTINUED | OUTPATIENT
Start: 2025-03-11 | End: 2025-03-12 | Stop reason: HOSPADM

## 2025-03-11 RX ORDER — HEPARIN SODIUM (PORCINE) LOCK FLUSH IV SOLN 100 UNIT/ML 100 UNIT/ML
500 SOLUTION INTRAVENOUS AS NEEDED
Status: CANCELLED | OUTPATIENT
Start: 2025-03-11

## 2025-03-11 RX ADMIN — Medication 10 ML: at 14:48

## 2025-03-11 RX ADMIN — HEPARIN 500 UNITS: 100 SYRINGE at 14:48

## 2025-03-13 ENCOUNTER — HOSPITAL ENCOUNTER (OUTPATIENT)
Dept: ONCOLOGY | Facility: HOSPITAL | Age: 75
Discharge: HOME | End: 2025-03-13
Payer: MEDICARE

## 2025-03-13 ENCOUNTER — TELEPHONE (OUTPATIENT)
Dept: ONCOLOGY | Facility: CLINIC | Age: 75
End: 2025-03-13
Payer: MEDICARE

## 2025-03-13 VITALS
WEIGHT: 102 LBS | HEIGHT: 62 IN | HEART RATE: 80 BPM | RESPIRATION RATE: 16 BRPM | TEMPERATURE: 97.6 F | DIASTOLIC BLOOD PRESSURE: 59 MMHG | SYSTOLIC BLOOD PRESSURE: 110 MMHG | BODY MASS INDEX: 18.77 KG/M2

## 2025-03-13 DIAGNOSIS — Z45.2 ENCOUNTER FOR CARE RELATED TO PORT-A-CATH: ICD-10-CM

## 2025-03-13 DIAGNOSIS — C25.0 MALIGNANT TUMOR OF HEAD OF PANCREAS: Primary | ICD-10-CM

## 2025-03-13 PROCEDURE — 25010000002 PACLITAXEL PROTEIN-BOUND PART PER 1 MG: Performed by: INTERNAL MEDICINE

## 2025-03-13 PROCEDURE — 25010000002 GEMCITABINE HCL 2 GM/20ML SOLUTION 20 ML VIAL: Performed by: INTERNAL MEDICINE

## 2025-03-13 PROCEDURE — 25810000003 SODIUM CHLORIDE 0.9 % SOLUTION: Performed by: INTERNAL MEDICINE

## 2025-03-13 PROCEDURE — 96413 CHEMO IV INFUSION 1 HR: CPT

## 2025-03-13 PROCEDURE — 25010000002 DEXAMETHASONE SODIUM PHOSPHATE 100 MG/10ML SOLUTION: Performed by: INTERNAL MEDICINE

## 2025-03-13 PROCEDURE — 25810000003 SODIUM CHLORIDE 0.9 % SOLUTION 250 ML FLEX CONT: Performed by: INTERNAL MEDICINE

## 2025-03-13 PROCEDURE — 25010000002 HEPARIN LOCK FLUSH PER 10 UNITS: Performed by: INTERNAL MEDICINE

## 2025-03-13 PROCEDURE — 96375 TX/PRO/DX INJ NEW DRUG ADDON: CPT

## 2025-03-13 PROCEDURE — 96417 CHEMO IV INFUS EACH ADDL SEQ: CPT

## 2025-03-13 RX ORDER — HEPARIN SODIUM (PORCINE) LOCK FLUSH IV SOLN 100 UNIT/ML 100 UNIT/ML
500 SOLUTION INTRAVENOUS AS NEEDED
OUTPATIENT
Start: 2025-03-13

## 2025-03-13 RX ORDER — SODIUM CHLORIDE 0.9 % (FLUSH) 0.9 %
20 SYRINGE (ML) INJECTION AS NEEDED
OUTPATIENT
Start: 2025-03-13

## 2025-03-13 RX ORDER — HEPARIN SODIUM (PORCINE) LOCK FLUSH IV SOLN 100 UNIT/ML 100 UNIT/ML
500 SOLUTION INTRAVENOUS AS NEEDED
Status: DISCONTINUED | OUTPATIENT
Start: 2025-03-13 | End: 2025-03-14 | Stop reason: HOSPADM

## 2025-03-13 RX ORDER — SODIUM CHLORIDE 0.9 % (FLUSH) 0.9 %
10 SYRINGE (ML) INJECTION AS NEEDED
OUTPATIENT
Start: 2025-03-13

## 2025-03-13 RX ORDER — SODIUM CHLORIDE 9 MG/ML
20 INJECTION, SOLUTION INTRAVENOUS ONCE
Status: COMPLETED | OUTPATIENT
Start: 2025-03-13 | End: 2025-03-13

## 2025-03-13 RX ORDER — PACLITAXEL 100 MG/20ML
125 INJECTION, POWDER, LYOPHILIZED, FOR SUSPENSION INTRAVENOUS ONCE
Status: COMPLETED | OUTPATIENT
Start: 2025-03-13 | End: 2025-03-13

## 2025-03-13 RX ADMIN — GEMCITABINE 1500 MG: 100 INJECTION, SOLUTION INTRAVENOUS at 14:36

## 2025-03-13 RX ADMIN — PACLITAXEL 190 MG: 100 INJECTION, POWDER, LYOPHILIZED, FOR SUSPENSION INTRAVENOUS at 13:54

## 2025-03-13 RX ADMIN — HEPARIN 500 UNITS: 100 SYRINGE at 15:11

## 2025-03-13 RX ADMIN — SODIUM CHLORIDE 20 ML/HR: 9 INJECTION, SOLUTION INTRAVENOUS at 13:08

## 2025-03-13 RX ADMIN — DEXAMETHASONE SODIUM PHOSPHATE 12 MG: 10 INJECTION, SOLUTION INTRAMUSCULAR; INTRAVENOUS at 13:08

## 2025-03-13 NOTE — TELEPHONE ENCOUNTER
Pt request sutures to be removed from 03/06/25. Pt fell leaving radiation and went to [facility] ED for laceration above right eye. Discharge instructions per ED dc summary are to remove suture in one week.   Dried scab noted above right eye, lateral to eyebrow. Two stitches visible. This RN removed two stitches. Pt tolerated well. Pt reported she thought she should have three stitches. This RN asked second nurse STACEY Contreras to assess for third stitch. Diane assessed eye and did not see third stitch. This RN advised pt if when scab is gone she still has another stitch she can come in and RN will remove. Pt verbalized understanding.

## 2025-03-25 ENCOUNTER — HOSPITAL ENCOUNTER (OUTPATIENT)
Dept: ONCOLOGY | Facility: HOSPITAL | Age: 75
Discharge: HOME | End: 2025-03-25
Admitting: INTERNAL MEDICINE
Payer: MEDICARE

## 2025-03-25 VITALS
SYSTOLIC BLOOD PRESSURE: 132 MMHG | HEART RATE: 92 BPM | RESPIRATION RATE: 18 BRPM | BODY MASS INDEX: 19.69 KG/M2 | TEMPERATURE: 98 F | HEIGHT: 62 IN | DIASTOLIC BLOOD PRESSURE: 60 MMHG | WEIGHT: 107 LBS

## 2025-03-25 DIAGNOSIS — Z45.2 ENCOUNTER FOR CARE RELATED TO PORT-A-CATH: Primary | ICD-10-CM

## 2025-03-25 DIAGNOSIS — C25.0 MALIGNANT TUMOR OF HEAD OF PANCREAS: ICD-10-CM

## 2025-03-25 LAB
ALBUMIN SERPL-MCNC: 3.5 G/DL (ref 3.5–5.2)
ALBUMIN/GLOB SERPL: 1.2 G/DL
ALP SERPL-CCNC: 231 U/L (ref 39–117)
ALT SERPL W P-5'-P-CCNC: 11 U/L (ref 1–33)
ANION GAP SERPL CALCULATED.3IONS-SCNC: 9 MMOL/L (ref 5–15)
AST SERPL-CCNC: 17 U/L (ref 1–32)
BASOPHILS # BLD AUTO: 0.06 10*3/MM3 (ref 0–0.2)
BASOPHILS NFR BLD AUTO: 1.3 % (ref 0–1.5)
BILIRUB SERPL-MCNC: 0.3 MG/DL (ref 0–1.2)
BUN SERPL-MCNC: 13 MG/DL (ref 8–23)
BUN/CREAT SERPL: 31.7 (ref 7–25)
CALCIUM SPEC-SCNC: 8.4 MG/DL (ref 8.6–10.5)
CANCER AG19-9 SERPL-ACNC: 931 U/ML
CHLORIDE SERPL-SCNC: 102 MMOL/L (ref 98–107)
CO2 SERPL-SCNC: 24 MMOL/L (ref 22–29)
CREAT SERPL-MCNC: 0.41 MG/DL (ref 0.57–1)
DEPRECATED RDW RBC AUTO: 63 FL (ref 37–54)
EGFRCR SERPLBLD CKD-EPI 2021: 102.1 ML/MIN/1.73
EOSINOPHIL # BLD AUTO: 0.19 10*3/MM3 (ref 0–0.4)
EOSINOPHIL NFR BLD AUTO: 4.3 % (ref 0.3–6.2)
ERYTHROCYTE [DISTWIDTH] IN BLOOD BY AUTOMATED COUNT: 18 % (ref 12.3–15.4)
GLOBULIN UR ELPH-MCNC: 2.9 GM/DL
GLUCOSE SERPL-MCNC: 87 MG/DL (ref 65–99)
HCT VFR BLD AUTO: 28.1 % (ref 34–46.6)
HGB BLD-MCNC: 9.1 G/DL (ref 12–15.9)
IMM GRANULOCYTES # BLD AUTO: 0.06 10*3/MM3 (ref 0–0.05)
IMM GRANULOCYTES NFR BLD AUTO: 1.3 % (ref 0–0.5)
LYMPHOCYTES # BLD AUTO: 1.32 10*3/MM3 (ref 0.7–3.1)
LYMPHOCYTES NFR BLD AUTO: 29.7 % (ref 19.6–45.3)
MCH RBC QN AUTO: 30.8 PG (ref 26.6–33)
MCHC RBC AUTO-ENTMCNC: 32.4 G/DL (ref 31.5–35.7)
MCV RBC AUTO: 95.3 FL (ref 79–97)
MONOCYTES # BLD AUTO: 0.84 10*3/MM3 (ref 0.1–0.9)
MONOCYTES NFR BLD AUTO: 18.9 % (ref 5–12)
NEUTROPHILS NFR BLD AUTO: 1.98 10*3/MM3 (ref 1.7–7)
NEUTROPHILS NFR BLD AUTO: 44.5 % (ref 42.7–76)
PLATELET # BLD AUTO: 224 10*3/MM3 (ref 140–450)
PMV BLD AUTO: 9.3 FL (ref 6–12)
POTASSIUM SERPL-SCNC: 4.4 MMOL/L (ref 3.5–5.2)
PROT SERPL-MCNC: 6.4 G/DL (ref 6–8.5)
RBC # BLD AUTO: 2.95 10*6/MM3 (ref 3.77–5.28)
SODIUM SERPL-SCNC: 135 MMOL/L (ref 136–145)
WBC NRBC COR # BLD AUTO: 4.45 10*3/MM3 (ref 3.4–10.8)

## 2025-03-25 PROCEDURE — 25010000002 HEPARIN LOCK FLUSH PER 10 UNITS: Performed by: INTERNAL MEDICINE

## 2025-03-25 PROCEDURE — 36591 DRAW BLOOD OFF VENOUS DEVICE: CPT

## 2025-03-25 PROCEDURE — 85025 COMPLETE CBC W/AUTO DIFF WBC: CPT | Performed by: INTERNAL MEDICINE

## 2025-03-25 PROCEDURE — 86301 IMMUNOASSAY TUMOR CA 19-9: CPT | Performed by: INTERNAL MEDICINE

## 2025-03-25 PROCEDURE — 80053 COMPREHEN METABOLIC PANEL: CPT | Performed by: INTERNAL MEDICINE

## 2025-03-25 RX ORDER — HEPARIN SODIUM (PORCINE) LOCK FLUSH IV SOLN 100 UNIT/ML 100 UNIT/ML
500 SOLUTION INTRAVENOUS AS NEEDED
OUTPATIENT
Start: 2025-03-25

## 2025-03-25 RX ORDER — HEPARIN SODIUM (PORCINE) LOCK FLUSH IV SOLN 100 UNIT/ML 100 UNIT/ML
500 SOLUTION INTRAVENOUS AS NEEDED
Status: DISCONTINUED | OUTPATIENT
Start: 2025-03-25 | End: 2025-03-26 | Stop reason: HOSPADM

## 2025-03-25 RX ORDER — SODIUM CHLORIDE 0.9 % (FLUSH) 0.9 %
20 SYRINGE (ML) INJECTION AS NEEDED
OUTPATIENT
Start: 2025-03-25

## 2025-03-25 RX ORDER — SODIUM CHLORIDE 0.9 % (FLUSH) 0.9 %
10 SYRINGE (ML) INJECTION AS NEEDED
OUTPATIENT
Start: 2025-03-25

## 2025-03-25 RX ORDER — SODIUM CHLORIDE 0.9 % (FLUSH) 0.9 %
10 SYRINGE (ML) INJECTION AS NEEDED
Status: DISCONTINUED | OUTPATIENT
Start: 2025-03-25 | End: 2025-03-26 | Stop reason: HOSPADM

## 2025-03-25 RX ADMIN — HEPARIN 500 UNITS: 100 SYRINGE at 14:38

## 2025-03-25 RX ADMIN — Medication 10 ML: at 14:38

## 2025-03-27 ENCOUNTER — OFFICE VISIT (OUTPATIENT)
Dept: ONCOLOGY | Facility: CLINIC | Age: 75
End: 2025-03-27
Payer: MEDICARE

## 2025-03-27 VITALS
BODY MASS INDEX: 19.51 KG/M2 | HEART RATE: 89 BPM | DIASTOLIC BLOOD PRESSURE: 73 MMHG | TEMPERATURE: 97.3 F | SYSTOLIC BLOOD PRESSURE: 132 MMHG | RESPIRATION RATE: 16 BRPM | OXYGEN SATURATION: 100 % | WEIGHT: 106 LBS | HEIGHT: 62 IN

## 2025-03-27 DIAGNOSIS — C25.0 MALIGNANT TUMOR OF HEAD OF PANCREAS: Primary | ICD-10-CM

## 2025-03-27 NOTE — PROGRESS NOTES
PROBLEM LIST:  Oncology/Hematology History   Malignant tumor of head of pancreas   10/2/2024 Cancer Staged    Staging form: Exocrine Pancreas, AJCC 8th Edition  - Clinical stage from 10/2/2024: Stage IV (cT2, cN0, cM1) - Signed by Ni Jackson MD on 10/4/2024     10/3/2024 Initial Diagnosis    Malignant tumor of head of pancreas     10/10/2024 -  Chemotherapy    OP PANCREATIC PACLitaxel Protein-Bound / Gemcitabine         REASON FOR VISIT: Pancreatic cancer    HISTORY OF PRESENT ILLNESS:   74 y.o.  female presents today for follow-up of her pancreatic cancer.  She is undergoing chemotherapy with Abraxane and gemcitabine.  She has completed 6 cycles.    She is starting to have significant neuropathy in her fingers.  This considerable numbness.  She is also having issues with fatigue that this come on.  No recent infections.  Having some lower extremity swelling.    Past medical history, social history and family history was reviewed 03/27/25 and unchanged from prior visit.    Review of Systems:    Review of Systems   Constitutional:  Positive for fatigue.   HENT:  Negative.     Eyes: Negative.    Respiratory: Negative.     Cardiovascular: Negative.    Gastrointestinal: Negative.    Endocrine: Negative.    Genitourinary: Negative.     Musculoskeletal: Negative.    Skin: Negative.    Neurological: Negative.    Hematological: Negative.    Psychiatric/Behavioral: Negative.              Medications:        Current Outpatient Medications:     amLODIPine-benazepril (LOTREL 5-10) 5-10 MG per capsule, Take 1 capsule by mouth Daily., Disp: , Rfl:     gabapentin (NEURONTIN) 300 MG capsule, Take 1 capsule by mouth 3 (Three) Times a Day., Disp: 90 capsule, Rfl: 0    levothyroxine (SYNTHROID, LEVOTHROID) 50 MCG tablet, Take 1 tablet by mouth., Disp: , Rfl:     lidocaine-prilocaine (EMLA) 2.5-2.5 % cream, Apply 1 Application topically to the appropriate area as directed As Needed (45-60 minutes prior to port access.  Cover  "with saran/plastic wrap)., Disp: 30 g, Rfl: 2    megestrol (MEGACE) 40 MG/ML suspension, Take 20 mL by mouth Daily., Disp: 480 mL, Rfl: 1    naloxone (NARCAN) 4 MG/0.1ML nasal spray, Administer 1 spray into the nostril(s) as directed by provider As Needed for Opioid Reversal or Respiratory Depression., Disp: 1 each, Rfl: 0    ondansetron (ZOFRAN) 8 MG tablet, Take 1 tablet by mouth 3 (Three) Times a Day As Needed for Nausea or Vomiting., Disp: 30 tablet, Rfl: 5    ondansetron ODT (ZOFRAN-ODT) 8 MG disintegrating tablet, Place 1 tablet on the tongue Every 8 (Eight) Hours As Needed for Nausea or Vomiting., Disp: 30 tablet, Rfl: 5    oxyCODONE (ROXICODONE) 10 MG tablet, Take 1 tablet by mouth Every 6 (Six) Hours As Needed for Moderate Pain or Severe Pain for up to 30 days., Disp: 120 tablet, Rfl: 0    Pancrelipase, Lip-Prot-Amyl, (CREON) 2385-5521 units capsule delayed-release particles capsule, Take 8 capsules by mouth 3 (Three) Times a Day With Meals., Disp: 900 capsule, Rfl: 5    rosuvastatin (CRESTOR) 5 MG tablet, Take 1 tablet by mouth Daily., Disp: , Rfl:     Pain Medications              gabapentin (NEURONTIN) 300 MG capsule Take 1 capsule by mouth 3 (Three) Times a Day.    oxyCODONE (ROXICODONE) 10 MG tablet Take 1 tablet by mouth Every 6 (Six) Hours As Needed for Moderate Pain or Severe Pain for up to 30 days.               ALLERGIES:  No Known Allergies      Physical Exam    VITAL SIGNS:  /73   Pulse 89   Temp 97.3 °F (36.3 °C) (Temporal)   Resp 16   Ht 157.5 cm (62.01\")   Wt 48.1 kg (106 lb)   SpO2 100%   BMI 19.38 kg/m²     ECOG score: 0           Wt Readings from Last 3 Encounters:   03/27/25 48.1 kg (106 lb)   03/25/25 48.5 kg (107 lb)   03/13/25 46.3 kg (102 lb)       Body mass index is 19.38 kg/m². Body surface area is 1.46 meters squared.       Performance Status: 1    General: well appearing, in no acute distress  HEENT: sclera anicteric, neck is supple  Lymphatics: no cervical, " supraclavicular, or axillary adenopathy  Cardiovascular: regular rate and rhythm, no murmurs, rubs or gallops  Lungs: clear to auscultation bilaterally  Abdomen: soft, nontender, nondistended.  No palpable organomegaly  Extremities: no lower extremity edema  Skin: no rashes, lesions, bruising, or petechiae  Msk:  Shows no weakness of the large muscle groups  Psych: Mood is stable        RECENT LABS:    Lab Results   Component Value Date    HGB 9.1 (L) 03/25/2025    HCT 28.1 (L) 03/25/2025    MCV 95.3 03/25/2025     03/25/2025    WBC 4.45 03/25/2025    NEUTROABS 1.98 03/25/2025    LYMPHSABS 1.32 03/25/2025    MONOSABS 0.84 03/25/2025    EOSABS 0.19 03/25/2025    BASOSABS 0.06 03/25/2025       Lab Results   Component Value Date    GLUCOSE 87 03/25/2025    BUN 13 03/25/2025    CREATININE 0.41 (L) 03/25/2025     (L) 03/25/2025    K 4.4 03/25/2025     03/25/2025    CO2 24.0 03/25/2025    CALCIUM 8.4 (L) 03/25/2025    PROTEINTOT 6.4 03/25/2025    ALBUMIN 3.5 03/25/2025    BILITOT 0.3 03/25/2025    ALKPHOS 231 (H) 03/25/2025    AST 17 03/25/2025    ALT 11 03/25/2025     Lab Results   Component Value Date     931.0 (H) 03/25/2025     1,956.0 (H) 02/25/2025     2,814.0 (H) 01/28/2025     3,752.0 (H) 12/31/2024     5,709.0 (H) 12/03/2024     CT Abdomen Pelvis With & Without Contrast    Result Date: 2/26/2025  Impression: 1.No significant change in the pancreatic head neoplasm as above 2.Stable mild intra and extrahepatic biliary ductal dilatation to the level of the pancreatic head mass. 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 4.Slight interval decrease in size of omental nodules. 5.No significant change in multiple hypodense liver lesions compatible with metastatic disease Electronically Signed: Harvey Alvarez MD  2/26/2025 9:50 AM EST  Workstation ID:  [id number]    CT Abdomen Pelvis With & Without Contrast    Result Date: 12/30/2024  Impression: Primary pancreatic head neoplasm is grossly similar in size. Grossly similar innumerable hepatic metastases, omental nodularity, and mesenteric lymphadenopathy. Slight interval recanalization of the previously occluded portosplenomesenteric venous confluence. New small volume free fluid. Electronically Signed: Merrill Duncan MD  12/30/2024 6:17 PM EST  Workstation ID: [id number]        Assessment/Plan    1.  Stage IV adenocarcinoma of the pancreas.   her tumor markers have improved .  She is starting not to tolerate her medicine well.  Neuropathy is significant.  I am going to hold her treatment for 2 weeks repeat my scans in 2 weeks.  After which I will plan to resume Abraxane with gemcitabine.  This may give us some time for her counts to recover also.  She is becoming more anemic and I suspect some of the fatigue is related to that.    2.  Heartburn secondary to chemotherapy.  on PPI and baking soda with water.      3.  Constipation.  Improve constipation with MiraLAX twice a day along with stool softener.    4.  Chemotherapy-induced neuropathy.  Mostly numbness.  No significant pain for now.      Total time of patient care on day of service including time prior to, face to face with patient, and following visit spent in reviewing records, lab results, imaging studies, discussion with patient, and documentation/charting was > 31 minutes.     Ni Jackson MD  [facility] Hematology and Oncology    Return on: 04/10/25  Return in (Approximately): 2 weeks, Schedule with next infusion    Orders Placed This Encounter   Procedures    CT Abdomen Pelvis With Contrast       3/27/2025     Future Appointments         Provider Department Center    4/7/2025 2:00 PM TC BRAN CT 1 [facility] CT AT [facility] [place]    4/8/2025 2:45 PM ACCESS AND LAB DRAW CHAIR 1 [facility] OUTPATIENT ONCOLOGY TC     4/10/2025 11:00 AM (Arrive by 10:45 AM) Ni Jackson MD [facility] HEMATOLOGY & ONCOLOGY TC    4/10/2025 12:00 PM ROOM A [facility] OUTPATIENT ONCOLOGY TC    6/9/2025 1:00 PM Michelle Arroyo PA-C [facility] PALLIATIVE CARE TC

## 2025-03-27 NOTE — LETTER
March 27, 2025     Jeannie Salgado MD  [address] [facility] [address]  [facility] [address]    Patient: Claudia Flores   YOB: 1950   Date of Visit: 3/27/2025     Dear Jeannie Salgado MD:       Thank you for referring Claudia Flores to me for evaluation. Below are the relevant portions of my assessment and plan of care.    If you have questions, please do not hesitate to call me. I look forward to following Claudia along with you.         Sincerely,        Ni Jackson MD        CC: No Recipients    Ni Jackson MD  03/27/25 1242  Sign when Signing Visit  PROBLEM LIST:  Oncology/Hematology History   Malignant tumor of head of pancreas   10/2/2024 Cancer Staged    Staging form: Exocrine Pancreas, AJCC 8th Edition  - Clinical stage from 10/2/2024: Stage IV (cT2, cN0, cM1) - Signed by Ni Jackson MD on 10/4/2024     10/3/2024 Initial Diagnosis    Malignant tumor of head of pancreas     10/10/2024 -  Chemotherapy    OP PANCREATIC PACLitaxel Protein-Bound / Gemcitabine         REASON FOR VISIT: Pancreatic cancer    HISTORY OF PRESENT ILLNESS:   74 y.o.  female presents today for follow-up of her pancreatic cancer.  She is undergoing chemotherapy with Abraxane and gemcitabine.  She has completed 6 cycles.    She is starting to have significant neuropathy in her fingers.  This considerable numbness.  She is also having issues with fatigue that this come on.  No recent infections.  Having some lower extremity swelling.    Past medical history, social history and family history was reviewed 03/27/25 and unchanged from prior visit.    Review of Systems:    Review of Systems   Constitutional:  Positive for fatigue.   HENT:  Negative.     Eyes: Negative.    Respiratory: Negative.     Cardiovascular: Negative.    Gastrointestinal: Negative.    Endocrine: Negative.    Genitourinary: Negative.     Musculoskeletal: Negative.    Skin: Negative.    Neurological: Negative.    Hematological:  Negative.    Psychiatric/Behavioral: Negative.              Medications:        Current Outpatient Medications:   •  amLODIPine-benazepril (LOTREL 5-10) 5-10 MG per capsule, Take 1 capsule by mouth Daily., Disp: , Rfl:   •  gabapentin (NEURONTIN) 300 MG capsule, Take 1 capsule by mouth 3 (Three) Times a Day., Disp: 90 capsule, Rfl: 0  •  levothyroxine (SYNTHROID, LEVOTHROID) 50 MCG tablet, Take 1 tablet by mouth., Disp: , Rfl:   •  lidocaine-prilocaine (EMLA) 2.5-2.5 % cream, Apply 1 Application topically to the appropriate area as directed As Needed (45-60 minutes prior to port access.  Cover with saran/plastic wrap)., Disp: 30 g, Rfl: 2  •  megestrol (MEGACE) 40 MG/ML suspension, Take 20 mL by mouth Daily., Disp: 480 mL, Rfl: 1  •  naloxone (NARCAN) 4 MG/0.1ML nasal spray, Administer 1 spray into the nostril(s) as directed by provider As Needed for Opioid Reversal or Respiratory Depression., Disp: 1 each, Rfl: 0  •  ondansetron (ZOFRAN) 8 MG tablet, Take 1 tablet by mouth 3 (Three) Times a Day As Needed for Nausea or Vomiting., Disp: 30 tablet, Rfl: 5  •  ondansetron ODT (ZOFRAN-ODT) 8 MG disintegrating tablet, Place 1 tablet on the tongue Every 8 (Eight) Hours As Needed for Nausea or Vomiting., Disp: 30 tablet, Rfl: 5  •  oxyCODONE (ROXICODONE) 10 MG tablet, Take 1 tablet by mouth Every 6 (Six) Hours As Needed for Moderate Pain or Severe Pain for up to 30 days., Disp: 120 tablet, Rfl: 0  •  Pancrelipase, Lip-Prot-Amyl, (CREON) 4963-5706 units capsule delayed-release particles capsule, Take 8 capsules by mouth 3 (Three) Times a Day With Meals., Disp: 900 capsule, Rfl: 5  •  rosuvastatin (CRESTOR) 5 MG tablet, Take 1 tablet by mouth Daily., Disp: , Rfl:     Pain Medications              gabapentin (NEURONTIN) 300 MG capsule Take 1 capsule by mouth 3 (Three) Times a Day.    oxyCODONE (ROXICODONE) 10 MG tablet Take 1 tablet by mouth Every 6 (Six) Hours As Needed for Moderate Pain or Severe Pain for up to 30 days.  "              ALLERGIES:  No Known Allergies      Physical Exam    VITAL SIGNS:  /73   Pulse 89   Temp 97.3 °F (36.3 °C) (Temporal)   Resp 16   Ht 157.5 cm (62.01\")   Wt 48.1 kg (106 lb)   SpO2 100%   BMI 19.38 kg/m²     ECOG score: 0           Wt Readings from Last 3 Encounters:   03/27/25 48.1 kg (106 lb)   03/25/25 48.5 kg (107 lb)   03/13/25 46.3 kg (102 lb)       Body mass index is 19.38 kg/m². Body surface area is 1.46 meters squared.       Performance Status: 1    General: well appearing, in no acute distress  HEENT: sclera anicteric, neck is supple  Lymphatics: no cervical, supraclavicular, or axillary adenopathy  Cardiovascular: regular rate and rhythm, no murmurs, rubs or gallops  Lungs: clear to auscultation bilaterally  Abdomen: soft, nontender, nondistended.  No palpable organomegaly  Extremities: no lower extremity edema  Skin: no rashes, lesions, bruising, or petechiae  Msk:  Shows no weakness of the large muscle groups  Psych: Mood is stable        RECENT LABS:    Lab Results   Component Value Date    HGB 9.1 (L) 03/25/2025    HCT 28.1 (L) 03/25/2025    MCV 95.3 03/25/2025     03/25/2025    WBC 4.45 03/25/2025    NEUTROABS 1.98 03/25/2025    LYMPHSABS 1.32 03/25/2025    MONOSABS 0.84 03/25/2025    EOSABS 0.19 03/25/2025    BASOSABS 0.06 03/25/2025       Lab Results   Component Value Date    GLUCOSE 87 03/25/2025    BUN 13 03/25/2025    CREATININE 0.41 (L) 03/25/2025     (L) 03/25/2025    K 4.4 03/25/2025     03/25/2025    CO2 24.0 03/25/2025    CALCIUM 8.4 (L) 03/25/2025    PROTEINTOT 6.4 03/25/2025    ALBUMIN 3.5 03/25/2025    BILITOT 0.3 03/25/2025    ALKPHOS 231 (H) 03/25/2025    AST 17 03/25/2025    ALT 11 03/25/2025     Lab Results   Component Value Date     931.0 (H) 03/25/2025     1,956.0 (H) 02/25/2025     2,814.0 (H) 01/28/2025     3,752.0 (H) 12/31/2024     5,709.0 (H) 12/03/2024     CT Abdomen Pelvis With & Without Contrast    Result " Date: 2/26/2025  Impression: 1.No significant change in the pancreatic head neoplasm as above 2.Stable mild intra and extrahepatic biliary ductal dilatation to the level of the pancreatic head mass. 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 4.Slight interval decrease in size of omental nodules. 5.No significant change in multiple hypodense liver lesions compatible with metastatic disease Electronically Signed: Harvey Alvarez MD  2/26/2025 9:50 AM EST  Workstation ID: [id number]    CT Abdomen Pelvis With & Without Contrast    Result Date: 12/30/2024  Impression: Primary pancreatic head neoplasm is grossly similar in size. Grossly similar innumerable hepatic metastases, omental nodularity, and mesenteric lymphadenopathy. Slight interval recanalization of the previously occluded portosplenomesenteric venous confluence. New small volume free fluid. Electronically Signed: Merrill Duncan MD  12/30/2024 6:17 PM EST  Workstation ID: [id number]        Assessment/Plan    1.  Stage IV adenocarcinoma of the pancreas.   her tumor markers have improved .  She is starting not to tolerate her medicine well.  Neuropathy is significant.  I am going to hold her treatment for 2 weeks repeat my scans in 2 weeks.  After which I will plan to resume Abraxane with gemcitabine.  This may give us some time for her counts to recover also.  She is becoming more anemic and I suspect some of the fatigue is related to that.    2.  Heartburn secondary to chemotherapy.  on PPI and baking soda with water.      3.  Constipation.  Improve constipation with MiraLAX twice a day along with stool softener.    4.  Chemotherapy-induced neuropathy.  Mostly numbness.  No significant pain for now.      Total time of patient care on day of service including time prior to, face to face with patient, and following visit spent in reviewing records,  lab results, imaging studies, discussion with patient, and documentation/charting was > 31 minutes.     Ni Jackson MD  [facility] Hematology and Oncology    Return on: 04/10/25  Return in (Approximately): 2 weeks, Schedule with next infusion    Orders Placed This Encounter   Procedures   • CT Abdomen Pelvis With Contrast       3/27/2025     Future Appointments         Provider Department Center    4/7/2025 2:00 PM TC BRAN CT 1 [facility] CT AT [facility]    4/8/2025 2:45 PM ACCESS AND LAB DRAW CHAIR 1 [facility] OUTPATIENT ONCOLOGY TC    4/10/2025 11:00 AM (Arrive by 10:45 AM) Ni Jackson MD [facility] HEMATOLOGY & ONCOLOGY TC    4/10/2025 12:00 PM ROOM A [facility] OUTPATIENT ONCOLOGY TC    6/9/2025 1:00 PM Michelle Arroyo PA-C [facility] PALLIATIVE CARE TC

## 2025-03-28 DIAGNOSIS — Z45.2 ENCOUNTER FOR CARE RELATED TO PORT-A-CATH: ICD-10-CM

## 2025-03-28 DIAGNOSIS — C25.0 MALIGNANT TUMOR OF HEAD OF PANCREAS: Primary | ICD-10-CM

## 2025-04-07 ENCOUNTER — HOSPITAL ENCOUNTER (OUTPATIENT)
Dept: CT IMAGING | Facility: HOSPITAL | Age: 75
Discharge: HOME | End: 2025-04-07
Admitting: INTERNAL MEDICINE
Payer: MEDICARE

## 2025-04-07 DIAGNOSIS — C25.0 MALIGNANT TUMOR OF HEAD OF PANCREAS: ICD-10-CM

## 2025-04-07 PROCEDURE — 25510000001 IOPAMIDOL 61 % SOLUTION: Performed by: INTERNAL MEDICINE

## 2025-04-07 PROCEDURE — 74177 CT ABD & PELVIS W/CONTRAST: CPT

## 2025-04-07 RX ORDER — IOPAMIDOL 612 MG/ML
85 INJECTION, SOLUTION INTRAVASCULAR
Status: COMPLETED | OUTPATIENT
Start: 2025-04-07 | End: 2025-04-07

## 2025-04-07 RX ADMIN — IOPAMIDOL 85 ML: 612 INJECTION, SOLUTION INTRAVENOUS at 14:05

## 2025-04-08 ENCOUNTER — HOSPITAL ENCOUNTER (OUTPATIENT)
Dept: ONCOLOGY | Facility: HOSPITAL | Age: 75
Discharge: HOME | End: 2025-04-08
Admitting: INTERNAL MEDICINE
Payer: MEDICARE

## 2025-04-08 VITALS
TEMPERATURE: 98 F | WEIGHT: 103 LBS | HEART RATE: 90 BPM | BODY MASS INDEX: 18.95 KG/M2 | DIASTOLIC BLOOD PRESSURE: 62 MMHG | SYSTOLIC BLOOD PRESSURE: 137 MMHG | HEIGHT: 62 IN | RESPIRATION RATE: 18 BRPM

## 2025-04-08 DIAGNOSIS — Z45.2 ENCOUNTER FOR CARE RELATED TO PORT-A-CATH: ICD-10-CM

## 2025-04-08 DIAGNOSIS — C25.0 MALIGNANT TUMOR OF HEAD OF PANCREAS: Primary | ICD-10-CM

## 2025-04-08 LAB
ALBUMIN SERPL-MCNC: 3.8 G/DL (ref 3.5–5.2)
ALBUMIN/GLOB SERPL: 1.7 G/DL
ALP SERPL-CCNC: 159 U/L (ref 39–117)
ALT SERPL W P-5'-P-CCNC: 10 U/L (ref 1–33)
ANION GAP SERPL CALCULATED.3IONS-SCNC: 9 MMOL/L (ref 5–15)
AST SERPL-CCNC: 18 U/L (ref 1–32)
BASOPHILS # BLD AUTO: 0.06 10*3/MM3 (ref 0–0.2)
BASOPHILS NFR BLD AUTO: 0.6 % (ref 0–1.5)
BILIRUB SERPL-MCNC: 0.3 MG/DL (ref 0–1.2)
BUN SERPL-MCNC: 13 MG/DL (ref 8–23)
BUN/CREAT SERPL: 26 (ref 7–25)
CALCIUM SPEC-SCNC: 8.8 MG/DL (ref 8.6–10.5)
CANCER AG19-9 SERPL-ACNC: 1200 U/ML
CHLORIDE SERPL-SCNC: 101 MMOL/L (ref 98–107)
CO2 SERPL-SCNC: 23 MMOL/L (ref 22–29)
CREAT SERPL-MCNC: 0.5 MG/DL (ref 0.57–1)
DEPRECATED RDW RBC AUTO: 59.3 FL (ref 37–54)
EGFRCR SERPLBLD CKD-EPI 2021: 97.3 ML/MIN/1.73
EOSINOPHIL # BLD AUTO: 0.13 10*3/MM3 (ref 0–0.4)
EOSINOPHIL NFR BLD AUTO: 1.4 % (ref 0.3–6.2)
ERYTHROCYTE [DISTWIDTH] IN BLOOD BY AUTOMATED COUNT: 17.1 % (ref 12.3–15.4)
GLOBULIN UR ELPH-MCNC: 2.2 GM/DL
GLUCOSE SERPL-MCNC: 84 MG/DL (ref 65–99)
HCT VFR BLD AUTO: 30.9 % (ref 34–46.6)
HGB BLD-MCNC: 10 G/DL (ref 12–15.9)
IMM GRANULOCYTES # BLD AUTO: 0.04 10*3/MM3 (ref 0–0.05)
IMM GRANULOCYTES NFR BLD AUTO: 0.4 % (ref 0–0.5)
LYMPHOCYTES # BLD AUTO: 1.6 10*3/MM3 (ref 0.7–3.1)
LYMPHOCYTES NFR BLD AUTO: 16.7 % (ref 19.6–45.3)
MCH RBC QN AUTO: 30.4 PG (ref 26.6–33)
MCHC RBC AUTO-ENTMCNC: 32.4 G/DL (ref 31.5–35.7)
MCV RBC AUTO: 93.9 FL (ref 79–97)
MONOCYTES # BLD AUTO: 1.03 10*3/MM3 (ref 0.1–0.9)
MONOCYTES NFR BLD AUTO: 10.7 % (ref 5–12)
NEUTROPHILS NFR BLD AUTO: 6.74 10*3/MM3 (ref 1.7–7)
NEUTROPHILS NFR BLD AUTO: 70.2 % (ref 42.7–76)
PLATELET # BLD AUTO: 249 10*3/MM3 (ref 140–450)
PMV BLD AUTO: 10 FL (ref 6–12)
POTASSIUM SERPL-SCNC: 4.3 MMOL/L (ref 3.5–5.2)
PROT SERPL-MCNC: 6 G/DL (ref 6–8.5)
RBC # BLD AUTO: 3.29 10*6/MM3 (ref 3.77–5.28)
SODIUM SERPL-SCNC: 133 MMOL/L (ref 136–145)
WBC NRBC COR # BLD AUTO: 9.6 10*3/MM3 (ref 3.4–10.8)

## 2025-04-08 PROCEDURE — 86301 IMMUNOASSAY TUMOR CA 19-9: CPT | Performed by: INTERNAL MEDICINE

## 2025-04-08 PROCEDURE — 85025 COMPLETE CBC W/AUTO DIFF WBC: CPT | Performed by: INTERNAL MEDICINE

## 2025-04-08 PROCEDURE — 25010000002 HEPARIN LOCK FLUSH PER 10 UNITS: Performed by: INTERNAL MEDICINE

## 2025-04-08 PROCEDURE — 36591 DRAW BLOOD OFF VENOUS DEVICE: CPT

## 2025-04-08 PROCEDURE — 80053 COMPREHEN METABOLIC PANEL: CPT | Performed by: INTERNAL MEDICINE

## 2025-04-08 RX ORDER — SODIUM CHLORIDE 0.9 % (FLUSH) 0.9 %
20 SYRINGE (ML) INJECTION AS NEEDED
Status: CANCELLED | OUTPATIENT
Start: 2025-04-08

## 2025-04-08 RX ORDER — SODIUM CHLORIDE 0.9 % (FLUSH) 0.9 %
10 SYRINGE (ML) INJECTION AS NEEDED
Status: CANCELLED | OUTPATIENT
Start: 2025-04-08

## 2025-04-08 RX ORDER — HEPARIN SODIUM (PORCINE) LOCK FLUSH IV SOLN 100 UNIT/ML 100 UNIT/ML
500 SOLUTION INTRAVENOUS AS NEEDED
Status: CANCELLED | OUTPATIENT
Start: 2025-04-08

## 2025-04-08 RX ORDER — SODIUM CHLORIDE 0.9 % (FLUSH) 0.9 %
10 SYRINGE (ML) INJECTION AS NEEDED
Status: DISCONTINUED | OUTPATIENT
Start: 2025-04-08 | End: 2025-04-09 | Stop reason: HOSPADM

## 2025-04-08 RX ORDER — HEPARIN SODIUM (PORCINE) LOCK FLUSH IV SOLN 100 UNIT/ML 100 UNIT/ML
500 SOLUTION INTRAVENOUS AS NEEDED
Status: DISCONTINUED | OUTPATIENT
Start: 2025-04-08 | End: 2025-04-09 | Stop reason: HOSPADM

## 2025-04-08 RX ORDER — SODIUM CHLORIDE 0.9 % (FLUSH) 0.9 %
20 SYRINGE (ML) INJECTION AS NEEDED
Status: DISCONTINUED | OUTPATIENT
Start: 2025-04-08 | End: 2025-04-09 | Stop reason: HOSPADM

## 2025-04-08 RX ADMIN — HEPARIN 500 UNITS: 100 SYRINGE at 15:12

## 2025-04-08 RX ADMIN — Medication 20 ML: at 15:12

## 2025-04-10 ENCOUNTER — OFFICE VISIT (OUTPATIENT)
Dept: ONCOLOGY | Facility: CLINIC | Age: 75
End: 2025-04-10
Payer: MEDICARE

## 2025-04-10 ENCOUNTER — HOSPITAL ENCOUNTER (OUTPATIENT)
Dept: ONCOLOGY | Facility: HOSPITAL | Age: 75
Discharge: HOME | End: 2025-04-10
Payer: MEDICARE

## 2025-04-10 VITALS
HEART RATE: 100 BPM | HEIGHT: 62 IN | SYSTOLIC BLOOD PRESSURE: 131 MMHG | WEIGHT: 103 LBS | BODY MASS INDEX: 18.95 KG/M2 | OXYGEN SATURATION: 99 % | RESPIRATION RATE: 16 BRPM | DIASTOLIC BLOOD PRESSURE: 63 MMHG | TEMPERATURE: 97.4 F

## 2025-04-10 DIAGNOSIS — C25.0 MALIGNANT TUMOR OF HEAD OF PANCREAS: Primary | ICD-10-CM

## 2025-04-10 DIAGNOSIS — Z45.2 ENCOUNTER FOR CARE RELATED TO PORT-A-CATH: ICD-10-CM

## 2025-04-10 DIAGNOSIS — T45.1X5A NEUROPATHY DUE TO CHEMOTHERAPEUTIC DRUG: ICD-10-CM

## 2025-04-10 DIAGNOSIS — G62.0 NEUROPATHY DUE TO CHEMOTHERAPEUTIC DRUG: ICD-10-CM

## 2025-04-10 PROCEDURE — 25010000002 PACLITAXEL PROTEIN-BOUND PART PER 1 MG: Performed by: INTERNAL MEDICINE

## 2025-04-10 PROCEDURE — 25010000002 DEXAMETHASONE SODIUM PHOSPHATE 100 MG/10ML SOLUTION: Performed by: INTERNAL MEDICINE

## 2025-04-10 PROCEDURE — 96375 TX/PRO/DX INJ NEW DRUG ADDON: CPT

## 2025-04-10 PROCEDURE — 25010000002 GEMCITABINE 1 GM/26.3ML SOLUTION 26.3 ML VIAL: Performed by: INTERNAL MEDICINE

## 2025-04-10 PROCEDURE — 96413 CHEMO IV INFUSION 1 HR: CPT

## 2025-04-10 PROCEDURE — 25810000003 SODIUM CHLORIDE 0.9 % SOLUTION 250 ML FLEX CONT: Performed by: INTERNAL MEDICINE

## 2025-04-10 PROCEDURE — 96417 CHEMO IV INFUS EACH ADDL SEQ: CPT

## 2025-04-10 PROCEDURE — 25010000002 HEPARIN LOCK FLUSH PER 10 UNITS: Performed by: INTERNAL MEDICINE

## 2025-04-10 PROCEDURE — 25010000002 GEMCITABINE 200 MG/5.26ML SOLUTION 5.26 ML VIAL: Performed by: INTERNAL MEDICINE

## 2025-04-10 RX ORDER — SODIUM CHLORIDE 9 MG/ML
20 INJECTION, SOLUTION INTRAVENOUS ONCE
Status: DISCONTINUED | OUTPATIENT
Start: 2025-04-10 | End: 2025-04-11 | Stop reason: HOSPADM

## 2025-04-10 RX ORDER — PACLITAXEL 100 MG/20ML
100 INJECTION, POWDER, LYOPHILIZED, FOR SUSPENSION INTRAVENOUS ONCE
OUTPATIENT
Start: 2025-05-22

## 2025-04-10 RX ORDER — PACLITAXEL 100 MG/20ML
100 INJECTION, POWDER, LYOPHILIZED, FOR SUSPENSION INTRAVENOUS ONCE
OUTPATIENT
Start: 2025-04-24

## 2025-04-10 RX ORDER — SODIUM CHLORIDE 0.9 % (FLUSH) 0.9 %
20 SYRINGE (ML) INJECTION AS NEEDED
OUTPATIENT
Start: 2025-04-10

## 2025-04-10 RX ORDER — SODIUM CHLORIDE 0.9 % (FLUSH) 0.9 %
20 SYRINGE (ML) INJECTION AS NEEDED
Status: DISCONTINUED | OUTPATIENT
Start: 2025-04-10 | End: 2025-04-11 | Stop reason: HOSPADM

## 2025-04-10 RX ORDER — SODIUM CHLORIDE 0.9 % (FLUSH) 0.9 %
10 SYRINGE (ML) INJECTION AS NEEDED
OUTPATIENT
Start: 2025-04-10

## 2025-04-10 RX ORDER — PACLITAXEL 100 MG/20ML
100 INJECTION, POWDER, LYOPHILIZED, FOR SUSPENSION INTRAVENOUS ONCE
OUTPATIENT
Start: 2025-05-08

## 2025-04-10 RX ORDER — HEPARIN SODIUM (PORCINE) LOCK FLUSH IV SOLN 100 UNIT/ML 100 UNIT/ML
500 SOLUTION INTRAVENOUS AS NEEDED
Status: DISCONTINUED | OUTPATIENT
Start: 2025-04-10 | End: 2025-04-11 | Stop reason: HOSPADM

## 2025-04-10 RX ORDER — PACLITAXEL 100 MG/20ML
100 INJECTION, POWDER, LYOPHILIZED, FOR SUSPENSION INTRAVENOUS ONCE
Status: CANCELLED | OUTPATIENT
Start: 2025-04-10

## 2025-04-10 RX ORDER — PACLITAXEL 100 MG/20ML
100 INJECTION, POWDER, LYOPHILIZED, FOR SUSPENSION INTRAVENOUS ONCE
Status: COMPLETED | OUTPATIENT
Start: 2025-04-10 | End: 2025-04-10

## 2025-04-10 RX ORDER — SODIUM CHLORIDE 0.9 % (FLUSH) 0.9 %
10 SYRINGE (ML) INJECTION AS NEEDED
Status: DISCONTINUED | OUTPATIENT
Start: 2025-04-10 | End: 2025-04-11 | Stop reason: HOSPADM

## 2025-04-10 RX ORDER — HEPARIN SODIUM (PORCINE) LOCK FLUSH IV SOLN 100 UNIT/ML 100 UNIT/ML
500 SOLUTION INTRAVENOUS AS NEEDED
OUTPATIENT
Start: 2025-04-10

## 2025-04-10 RX ADMIN — DEXAMETHASONE SODIUM PHOSPHATE 12 MG: 10 INJECTION, SOLUTION INTRAMUSCULAR; INTRAVENOUS at 12:13

## 2025-04-10 RX ADMIN — HEPARIN 500 UNITS: 100 SYRINGE at 14:22

## 2025-04-10 RX ADMIN — GEMCITABINE HYDROCHLORIDE 1200 MG: 1 INJECTION, SOLUTION INTRAVENOUS at 13:46

## 2025-04-10 RX ADMIN — PACLITAXEL 150 MG: 100 INJECTION, POWDER, LYOPHILIZED, FOR SUSPENSION INTRAVENOUS at 12:48

## 2025-04-10 NOTE — ADDENDUM NOTE
Encounter addended by: Karen Hamilton RN on: 4/10/2025 3:29 PM   Actions taken: Order Reconciliation Section accessed, Flowsheet accepted

## 2025-04-10 NOTE — LETTER
April 10, 2025     Jeannie Salgado MD  [address]  [facility] [address]    Patient: Claudia Flores   YOB: 1950   Date of Visit: 4/10/2025     Dear Jeannie Salgado MD:       Thank you for referring Claudia Flores to me for evaluation. Below are the relevant portions of my assessment and plan of care.    If you have questions, please do not hesitate to call me. I look forward to following Claudia along with you.         Sincerely,        Ni Jackson MD        CC: No Recipients    Ni Jackson MD  04/10/25 1202  Sign when Signing Visit  PROBLEM LIST:  Oncology/Hematology History   Malignant tumor of head of pancreas   10/2/2024 Cancer Staged    Staging form: Exocrine Pancreas, AJCC 8th Edition  - Clinical stage from 10/2/2024: Stage IV (cT2, cN0, cM1) - Signed by Ni Jackson MD on 10/4/2024     10/3/2024 Initial Diagnosis    Malignant tumor of head of pancreas     10/10/2024 -  Chemotherapy    OP PANCREATIC PACLitaxel Protein-Bound / Gemcitabine         REASON FOR VISIT: Pancreatic cancer    HISTORY OF PRESENT ILLNESS:   74 y.o.  female presents today for follow-up of her pancreatic cancer.  She is undergoing chemotherapy with Abraxane and gemcitabine.  She has completed 6 cycles.    She is starting to have significant neuropathy in her fingers.  This considerable numbness.  Subsequently I held her treatment for 1 cycle.  She presents after scans are done.  She is no better.  Her neuropathy is still significant.    Past medical history, social history and family history was reviewed 04/10/25 and unchanged from prior visit.    Review of Systems:    Review of Systems   Constitutional:  Positive for fatigue.   HENT:  Negative.     Eyes: Negative.    Respiratory: Negative.     Cardiovascular: Negative.    Gastrointestinal: Negative.    Endocrine: Negative.    Genitourinary: Negative.     Musculoskeletal: Negative.    Skin: Negative.    Neurological: Negative.     Hematological: Negative.    Psychiatric/Behavioral: Negative.              Medications:        Current Outpatient Medications:   •  amLODIPine-benazepril (LOTREL 5-10) 5-10 MG per capsule, Take 1 capsule by mouth Daily., Disp: , Rfl:   •  gabapentin (NEURONTIN) 300 MG capsule, Take 1 capsule by mouth 3 (Three) Times a Day., Disp: 90 capsule, Rfl: 0  •  levothyroxine (SYNTHROID, LEVOTHROID) 50 MCG tablet, Take 1 tablet by mouth., Disp: , Rfl:   •  lidocaine-prilocaine (EMLA) 2.5-2.5 % cream, Apply 1 Application topically to the appropriate area as directed As Needed (45-60 minutes prior to port access.  Cover with saran/plastic wrap)., Disp: 30 g, Rfl: 2  •  megestrol (MEGACE) 40 MG/ML suspension, Take 20 mL by mouth Daily., Disp: 480 mL, Rfl: 1  •  naloxone (NARCAN) 4 MG/0.1ML nasal spray, Administer 1 spray into the nostril(s) as directed by provider As Needed for Opioid Reversal or Respiratory Depression., Disp: 1 each, Rfl: 0  •  ondansetron (ZOFRAN) 8 MG tablet, Take 1 tablet by mouth 3 (Three) Times a Day As Needed for Nausea or Vomiting., Disp: 30 tablet, Rfl: 5  •  ondansetron ODT (ZOFRAN-ODT) 8 MG disintegrating tablet, Place 1 tablet on the tongue Every 8 (Eight) Hours As Needed for Nausea or Vomiting., Disp: 30 tablet, Rfl: 5  •  oxyCODONE (ROXICODONE) 10 MG tablet, Take 1 tablet by mouth Every 6 (Six) Hours As Needed for Moderate Pain or Severe Pain for up to 30 days., Disp: 120 tablet, Rfl: 0  •  Pancrelipase, Lip-Prot-Amyl, (CREON) 0775-9816 units capsule delayed-release particles capsule, Take 8 capsules by mouth 3 (Three) Times a Day With Meals., Disp: 900 capsule, Rfl: 5  •  rosuvastatin (CRESTOR) 5 MG tablet, Take 1 tablet by mouth Daily., Disp: , Rfl:     Pain Medications              gabapentin (NEURONTIN) 300 MG capsule Take 1 capsule by mouth 3 (Three) Times a Day.    oxyCODONE (ROXICODONE) 10 MG tablet Take 1 tablet by mouth Every 6 (Six) Hours As Needed for Moderate Pain or Severe Pain for  "up to 30 days.               ALLERGIES:  No Known Allergies      Physical Exam    VITAL SIGNS:  /63 Comment: LUE  Pulse 100   Temp 97.4 °F (36.3 °C) (Temporal)   Resp 16   Ht 157.5 cm (62\")   Wt 46.7 kg (103 lb)   SpO2 99% Comment: RA  BMI 18.84 kg/m²     ECOG score: 1           Wt Readings from Last 3 Encounters:   04/10/25 46.7 kg (103 lb)   04/08/25 46.7 kg (103 lb)   03/27/25 48.1 kg (106 lb)       Body mass index is 18.84 kg/m². Body surface area is 1.44 meters squared.       Performance Status: 1    General: well appearing, in no acute distress  HEENT: sclera anicteric, neck is supple  Lymphatics: no cervical, supraclavicular, or axillary adenopathy  Cardiovascular: regular rate and rhythm, no murmurs, rubs or gallops  Lungs: clear to auscultation bilaterally  Abdomen: soft, nontender, nondistended.  No palpable organomegaly  Extremities: no lower extremity edema  Skin: no rashes, lesions, bruising, or petechiae  Msk:  Shows no weakness of the large muscle groups  Psych: Mood is stable        RECENT LABS:    Lab Results   Component Value Date    HGB 10.0 (L) 04/08/2025    HCT 30.9 (L) 04/08/2025    MCV 93.9 04/08/2025     04/08/2025    WBC 9.60 04/08/2025    NEUTROABS 6.74 04/08/2025    LYMPHSABS 1.60 04/08/2025    MONOSABS 1.03 (H) 04/08/2025    EOSABS 0.13 04/08/2025    BASOSABS 0.06 04/08/2025       Lab Results   Component Value Date    GLUCOSE 84 04/08/2025    BUN 13 04/08/2025    CREATININE 0.50 (L) 04/08/2025     (L) 04/08/2025    K 4.3 04/08/2025     04/08/2025    CO2 23.0 04/08/2025    CALCIUM 8.8 04/08/2025    PROTEINTOT 6.0 04/08/2025    ALBUMIN 3.8 04/08/2025    BILITOT 0.3 04/08/2025    ALKPHOS 159 (H) 04/08/2025    AST 18 04/08/2025    ALT 10 04/08/2025     Lab Results   Component Value Date     1,200.0 (H) 04/08/2025     931.0 (H) 03/25/2025     1,956.0 (H) 02/25/2025     2,814.0 (H) 01/28/2025     3,752.0 (H) 12/31/2024     CT Abdomen " Pelvis With & Without Contrast    Result Date: 2/26/2025  Impression: 1.No significant change in the pancreatic head neoplasm as above 2.Stable mild intra and extrahepatic biliary ductal dilatation to the level of the pancreatic head mass. 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 4.Slight interval decrease in size of omental nodules. 5.No significant change in multiple hypodense liver lesions compatible with metastatic disease Electronically Signed: Harvey Alvarez MD  2/26/2025 9:50 AM EST  Workstation ID: [id number]    CT Abdomen Pelvis With & Without Contrast    Result Date: 12/30/2024  Impression: Primary pancreatic head neoplasm is grossly similar in size. Grossly similar innumerable hepatic metastases, omental nodularity, and mesenteric lymphadenopathy. Slight interval recanalization of the previously occluded portosplenomesenteric venous confluence. New small volume free fluid. Electronically Signed: Merrill Duncan MD  12/30/2024 6:17 PM EST  Workstation ID: [id number]        Assessment/Plan    1.  Stage IV adenocarcinoma of the pancreas.   Her tumor markers have increased in the time that I gave her off.  I reviewed the scans which shows some improvement in her disease.  Nothing markedly so but definitely better than maybe she was in December and February.  I have suggested starting back on Abraxane and gemcitabine at a lower dose and seeing how she does.  She may be a candidate for FOLFIRINOX however it would have to be dosed reduced at age 74.  I will reserve that if she has significant progression.  Will have to discuss her options at that point.  For now resume treatment with gemcitabine and Abraxane with dose reduction.    2.  Heartburn secondary to chemotherapy.  on PPI and baking soda with water.      3.  Constipation.  Improve constipation with MiraLAX twice a day along with stool  softener.    4.  Chemotherapy-induced neuropathy.  Mostly numbness.  No significant pain for now.      Total time of patient care on day of service including time prior to, face to face with patient, and following visit spent in reviewing records, lab results, imaging studies, discussion with patient, and documentation/charting was >45 minutes.     Ni Jackson MD  [facility] Hematology and Oncology         No orders of the defined types were placed in this encounter.      4/10/2025     Future Appointments         Provider Department Center    4/10/2025 12:00 PM ROOM A [facility] OUTPATIENT ONCOLOGY TC    6/9/2025 1:00 PM Michelle Arroyo PA-C [facility] PALLIATIVE CARE TC

## 2025-04-10 NOTE — PROGRESS NOTES
PROBLEM LIST:  Oncology/Hematology History   Malignant tumor of head of pancreas   10/2/2024 Cancer Staged    Staging form: Exocrine Pancreas, AJCC 8th Edition  - Clinical stage from 10/2/2024: Stage IV (cT2, cN0, cM1) - Signed by Ni Jackson MD on 10/4/2024     10/3/2024 Initial Diagnosis    Malignant tumor of head of pancreas     10/10/2024 -  Chemotherapy    OP PANCREATIC PACLitaxel Protein-Bound / Gemcitabine         REASON FOR VISIT: Pancreatic cancer    HISTORY OF PRESENT ILLNESS:   74 y.o.  female presents today for follow-up of her pancreatic cancer.  She is undergoing chemotherapy with Abraxane and gemcitabine.  She has completed 6 cycles.    She is starting to have significant neuropathy in her fingers.  This considerable numbness.  Subsequently I held her treatment for 1 cycle.  She presents after scans are done.  She is no better.  Her neuropathy is still significant.    Past medical history, social history and family history was reviewed 04/10/25 and unchanged from prior visit.    Review of Systems:    Review of Systems   Constitutional:  Positive for fatigue.   HENT:  Negative.     Eyes: Negative.    Respiratory: Negative.     Cardiovascular: Negative.    Gastrointestinal: Negative.    Endocrine: Negative.    Genitourinary: Negative.     Musculoskeletal: Negative.    Skin: Negative.    Neurological: Negative.    Hematological: Negative.    Psychiatric/Behavioral: Negative.              Medications:        Current Outpatient Medications:     amLODIPine-benazepril (LOTREL 5-10) 5-10 MG per capsule, Take 1 capsule by mouth Daily., Disp: , Rfl:     gabapentin (NEURONTIN) 300 MG capsule, Take 1 capsule by mouth 3 (Three) Times a Day., Disp: 90 capsule, Rfl: 0    levothyroxine (SYNTHROID, LEVOTHROID) 50 MCG tablet, Take 1 tablet by mouth., Disp: , Rfl:     lidocaine-prilocaine (EMLA) 2.5-2.5 % cream, Apply 1 Application topically to the appropriate area as directed As Needed (45-60 minutes prior to  "port access.  Cover with saran/plastic wrap)., Disp: 30 g, Rfl: 2    megestrol (MEGACE) 40 MG/ML suspension, Take 20 mL by mouth Daily., Disp: 480 mL, Rfl: 1    naloxone (NARCAN) 4 MG/0.1ML nasal spray, Administer 1 spray into the nostril(s) as directed by provider As Needed for Opioid Reversal or Respiratory Depression., Disp: 1 each, Rfl: 0    ondansetron (ZOFRAN) 8 MG tablet, Take 1 tablet by mouth 3 (Three) Times a Day As Needed for Nausea or Vomiting., Disp: 30 tablet, Rfl: 5    ondansetron ODT (ZOFRAN-ODT) 8 MG disintegrating tablet, Place 1 tablet on the tongue Every 8 (Eight) Hours As Needed for Nausea or Vomiting., Disp: 30 tablet, Rfl: 5    oxyCODONE (ROXICODONE) 10 MG tablet, Take 1 tablet by mouth Every 6 (Six) Hours As Needed for Moderate Pain or Severe Pain for up to 30 days., Disp: 120 tablet, Rfl: 0    Pancrelipase, Lip-Prot-Amyl, (CREON) 6797-4162 units capsule delayed-release particles capsule, Take 8 capsules by mouth 3 (Three) Times a Day With Meals., Disp: 900 capsule, Rfl: 5    rosuvastatin (CRESTOR) 5 MG tablet, Take 1 tablet by mouth Daily., Disp: , Rfl:     Pain Medications              gabapentin (NEURONTIN) 300 MG capsule Take 1 capsule by mouth 3 (Three) Times a Day.    oxyCODONE (ROXICODONE) 10 MG tablet Take 1 tablet by mouth Every 6 (Six) Hours As Needed for Moderate Pain or Severe Pain for up to 30 days.               ALLERGIES:  No Known Allergies      Physical Exam    VITAL SIGNS:  /63 Comment: LUE  Pulse 100   Temp 97.4 °F (36.3 °C) (Temporal)   Resp 16   Ht 157.5 cm (62\")   Wt 46.7 kg (103 lb)   SpO2 99% Comment: RA  BMI 18.84 kg/m²     ECOG score: 1           Wt Readings from Last 3 Encounters:   04/10/25 46.7 kg (103 lb)   04/08/25 46.7 kg (103 lb)   03/27/25 48.1 kg (106 lb)       Body mass index is 18.84 kg/m². Body surface area is 1.44 meters squared.       Performance Status: 1    General: well appearing, in no acute distress  HEENT: sclera anicteric, neck is " supple  Lymphatics: no cervical, supraclavicular, or axillary adenopathy  Cardiovascular: regular rate and rhythm, no murmurs, rubs or gallops  Lungs: clear to auscultation bilaterally  Abdomen: soft, nontender, nondistended.  No palpable organomegaly  Extremities: no lower extremity edema  Skin: no rashes, lesions, bruising, or petechiae  Msk:  Shows no weakness of the large muscle groups  Psych: Mood is stable        RECENT LABS:    Lab Results   Component Value Date    HGB 10.0 (L) 04/08/2025    HCT 30.9 (L) 04/08/2025    MCV 93.9 04/08/2025     04/08/2025    WBC 9.60 04/08/2025    NEUTROABS 6.74 04/08/2025    LYMPHSABS 1.60 04/08/2025    MONOSABS 1.03 (H) 04/08/2025    EOSABS 0.13 04/08/2025    BASOSABS 0.06 04/08/2025       Lab Results   Component Value Date    GLUCOSE 84 04/08/2025    BUN 13 04/08/2025    CREATININE 0.50 (L) 04/08/2025     (L) 04/08/2025    K 4.3 04/08/2025     04/08/2025    CO2 23.0 04/08/2025    CALCIUM 8.8 04/08/2025    PROTEINTOT 6.0 04/08/2025    ALBUMIN 3.8 04/08/2025    BILITOT 0.3 04/08/2025    ALKPHOS 159 (H) 04/08/2025    AST 18 04/08/2025    ALT 10 04/08/2025     Lab Results   Component Value Date     1,200.0 (H) 04/08/2025     931.0 (H) 03/25/2025     1,956.0 (H) 02/25/2025     2,814.0 (H) 01/28/2025     3,752.0 (H) 12/31/2024     CT Abdomen Pelvis With & Without Contrast    Result Date: 2/26/2025  Impression: 1.No significant change in the pancreatic head neoplasm as above 2.Stable mild intra and extrahepatic biliary ductal dilatation to the level of the pancreatic head mass. 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 4.Slight interval decrease in size of omental nodules. 5.No significant change in multiple hypodense liver lesions compatible with metastatic disease Electronically Signed: Harvey Alvarez MD  2/26/2025  9:50 AM EST  Workstation ID: [id number]    CT Abdomen Pelvis With & Without Contrast    Result Date: 12/30/2024  Impression: Primary pancreatic head neoplasm is grossly similar in size. Grossly similar innumerable hepatic metastases, omental nodularity, and mesenteric lymphadenopathy. Slight interval recanalization of the previously occluded portosplenomesenteric venous confluence. New small volume free fluid. Electronically Signed: Merrill Duncan MD  12/30/2024 6:17 PM EST  Workstation ID: [id number]        Assessment/Plan    1.  Stage IV adenocarcinoma of the pancreas.   Her tumor markers have increased in the time that I gave her off.  I reviewed the scans which shows some improvement in her disease.  Nothing markedly so but definitely better than maybe she was in December and February.  I have suggested starting back on Abraxane and gemcitabine at a lower dose and seeing how she does.  She may be a candidate for FOLFIRINOX however it would have to be dosed reduced at age 74.  I will reserve that if she has significant progression.  Will have to discuss her options at that point.  For now resume treatment with gemcitabine and Abraxane with dose reduction.    2.  Heartburn secondary to chemotherapy.  on PPI and baking soda with water.      3.  Constipation.  Improve constipation with MiraLAX twice a day along with stool softener.    4.  Chemotherapy-induced neuropathy.  Mostly numbness.  No significant pain for now.      Total time of patient care on day of service including time prior to, face to face with patient, and following visit spent in reviewing records, lab results, imaging studies, discussion with patient, and documentation/charting was >45 minutes.     Ni Jackson MD  [facility] Hematology and Oncology         No orders of the defined types were placed in this encounter.      4/10/2025     Future Appointments         Provider Department Center    4/10/2025 12:00 PM ROOM A [facility]  [place] OUTPATIENT ONCOLOGY TC    6/9/2025 1:00 PM Michelle Arroyo PA-C [facility] PALLIATIVE CARE TC

## 2025-04-21 ENCOUNTER — TELEPHONE (OUTPATIENT)
Dept: PALLIATIVE CARE | Facility: CLINIC | Age: 75
End: 2025-04-21
Payer: MEDICARE

## 2025-04-21 DIAGNOSIS — G89.3 CANCER RELATED PAIN: Primary | ICD-10-CM

## 2025-04-21 RX ORDER — OXYCODONE HYDROCHLORIDE 10 MG/1
10 TABLET ORAL EVERY 6 HOURS PRN
Qty: 120 TABLET | Refills: 0 | Status: SHIPPED | OUTPATIENT
Start: 2025-04-21 | End: 2025-05-21

## 2025-04-21 NOTE — TELEPHONE ENCOUNTER
PATIENT CALLED AND STATED THAT SHE NEEDS A [facility] PLEASE ADVISE.

## 2025-04-21 NOTE — TELEPHONE ENCOUNTER
LETICIA #: [id number]    Medication requested: Oxycodone 10 MG    Last fill date: 3/15/25    Last appointment: 3/6/25    Next appointment: 6/9/25

## 2025-04-22 ENCOUNTER — HOSPITAL ENCOUNTER (OUTPATIENT)
Dept: ONCOLOGY | Facility: HOSPITAL | Age: 75
Discharge: HOME | End: 2025-04-22
Admitting: INTERNAL MEDICINE
Payer: MEDICARE

## 2025-04-22 VITALS
WEIGHT: 104 LBS | BODY MASS INDEX: 19.14 KG/M2 | DIASTOLIC BLOOD PRESSURE: 60 MMHG | SYSTOLIC BLOOD PRESSURE: 129 MMHG | HEIGHT: 62 IN | RESPIRATION RATE: 18 BRPM | HEART RATE: 76 BPM | TEMPERATURE: 97.9 F

## 2025-04-22 DIAGNOSIS — C25.0 MALIGNANT TUMOR OF HEAD OF PANCREAS: ICD-10-CM

## 2025-04-22 DIAGNOSIS — Z45.2 ENCOUNTER FOR CARE RELATED TO PORT-A-CATH: Primary | ICD-10-CM

## 2025-04-22 LAB
ALBUMIN SERPL-MCNC: 3.5 G/DL (ref 3.5–5.2)
ALBUMIN/GLOB SERPL: 1.5 G/DL
ALP SERPL-CCNC: 141 U/L (ref 39–117)
ALT SERPL W P-5'-P-CCNC: 11 U/L (ref 1–33)
ANION GAP SERPL CALCULATED.3IONS-SCNC: 9 MMOL/L (ref 5–15)
AST SERPL-CCNC: 17 U/L (ref 1–32)
BASOPHILS # BLD AUTO: 0.04 10*3/MM3 (ref 0–0.2)
BASOPHILS NFR BLD AUTO: 0.6 % (ref 0–1.5)
BILIRUB SERPL-MCNC: 0.2 MG/DL (ref 0–1.2)
BUN SERPL-MCNC: 11 MG/DL (ref 8–23)
BUN/CREAT SERPL: 18.6 (ref 7–25)
CALCIUM SPEC-SCNC: 8.5 MG/DL (ref 8.6–10.5)
CHLORIDE SERPL-SCNC: 100 MMOL/L (ref 98–107)
CO2 SERPL-SCNC: 26 MMOL/L (ref 22–29)
CREAT SERPL-MCNC: 0.59 MG/DL (ref 0.57–1)
DEPRECATED RDW RBC AUTO: 57.4 FL (ref 37–54)
EGFRCR SERPLBLD CKD-EPI 2021: 93.5 ML/MIN/1.73
EOSINOPHIL # BLD AUTO: 0.22 10*3/MM3 (ref 0–0.4)
EOSINOPHIL NFR BLD AUTO: 3.5 % (ref 0.3–6.2)
ERYTHROCYTE [DISTWIDTH] IN BLOOD BY AUTOMATED COUNT: 16.2 % (ref 12.3–15.4)
GLOBULIN UR ELPH-MCNC: 2.4 GM/DL
GLUCOSE SERPL-MCNC: 122 MG/DL (ref 65–99)
HCT VFR BLD AUTO: 30.1 % (ref 34–46.6)
HGB BLD-MCNC: 9.5 G/DL (ref 12–15.9)
IMM GRANULOCYTES # BLD AUTO: 0.02 10*3/MM3 (ref 0–0.05)
IMM GRANULOCYTES NFR BLD AUTO: 0.3 % (ref 0–0.5)
LYMPHOCYTES # BLD AUTO: 1.39 10*3/MM3 (ref 0.7–3.1)
LYMPHOCYTES NFR BLD AUTO: 21.9 % (ref 19.6–45.3)
MCH RBC QN AUTO: 30.3 PG (ref 26.6–33)
MCHC RBC AUTO-ENTMCNC: 31.6 G/DL (ref 31.5–35.7)
MCV RBC AUTO: 95.9 FL (ref 79–97)
MONOCYTES # BLD AUTO: 0.61 10*3/MM3 (ref 0.1–0.9)
MONOCYTES NFR BLD AUTO: 9.6 % (ref 5–12)
NEUTROPHILS NFR BLD AUTO: 4.06 10*3/MM3 (ref 1.7–7)
NEUTROPHILS NFR BLD AUTO: 64.1 % (ref 42.7–76)
PLATELET # BLD AUTO: 236 10*3/MM3 (ref 140–450)
PMV BLD AUTO: 10.2 FL (ref 6–12)
POTASSIUM SERPL-SCNC: 3.9 MMOL/L (ref 3.5–5.2)
PROT SERPL-MCNC: 5.9 G/DL (ref 6–8.5)
RBC # BLD AUTO: 3.14 10*6/MM3 (ref 3.77–5.28)
SODIUM SERPL-SCNC: 135 MMOL/L (ref 136–145)
WBC NRBC COR # BLD AUTO: 6.34 10*3/MM3 (ref 3.4–10.8)

## 2025-04-22 PROCEDURE — 25010000002 HEPARIN LOCK FLUSH PER 10 UNITS: Performed by: INTERNAL MEDICINE

## 2025-04-22 PROCEDURE — 85025 COMPLETE CBC W/AUTO DIFF WBC: CPT | Performed by: INTERNAL MEDICINE

## 2025-04-22 PROCEDURE — 80053 COMPREHEN METABOLIC PANEL: CPT | Performed by: INTERNAL MEDICINE

## 2025-04-22 PROCEDURE — 36591 DRAW BLOOD OFF VENOUS DEVICE: CPT

## 2025-04-22 RX ORDER — SODIUM CHLORIDE 0.9 % (FLUSH) 0.9 %
10 SYRINGE (ML) INJECTION AS NEEDED
Status: DISCONTINUED | OUTPATIENT
Start: 2025-04-22 | End: 2025-04-23 | Stop reason: HOSPADM

## 2025-04-22 RX ORDER — HEPARIN SODIUM (PORCINE) LOCK FLUSH IV SOLN 100 UNIT/ML 100 UNIT/ML
500 SOLUTION INTRAVENOUS AS NEEDED
Status: DISCONTINUED | OUTPATIENT
Start: 2025-04-22 | End: 2025-04-23 | Stop reason: HOSPADM

## 2025-04-22 RX ORDER — SODIUM CHLORIDE 0.9 % (FLUSH) 0.9 %
20 SYRINGE (ML) INJECTION AS NEEDED
Status: CANCELLED | OUTPATIENT
Start: 2025-04-22

## 2025-04-22 RX ORDER — SODIUM CHLORIDE 0.9 % (FLUSH) 0.9 %
10 SYRINGE (ML) INJECTION AS NEEDED
Status: CANCELLED | OUTPATIENT
Start: 2025-04-22

## 2025-04-22 RX ORDER — HEPARIN SODIUM (PORCINE) LOCK FLUSH IV SOLN 100 UNIT/ML 100 UNIT/ML
500 SOLUTION INTRAVENOUS AS NEEDED
Status: CANCELLED | OUTPATIENT
Start: 2025-04-22

## 2025-04-22 RX ADMIN — HEPARIN 500 UNITS: 100 SYRINGE at 14:27

## 2025-04-22 RX ADMIN — Medication 10 ML: at 14:27

## 2025-04-24 ENCOUNTER — OFFICE VISIT (OUTPATIENT)
Dept: ONCOLOGY | Facility: CLINIC | Age: 75
End: 2025-04-24
Payer: MEDICARE

## 2025-04-24 ENCOUNTER — HOSPITAL ENCOUNTER (OUTPATIENT)
Dept: ONCOLOGY | Facility: HOSPITAL | Age: 75
Discharge: HOME | End: 2025-04-24
Payer: MEDICARE

## 2025-04-24 VITALS
DIASTOLIC BLOOD PRESSURE: 60 MMHG | TEMPERATURE: 97.3 F | OXYGEN SATURATION: 98 % | BODY MASS INDEX: 19.32 KG/M2 | WEIGHT: 105 LBS | SYSTOLIC BLOOD PRESSURE: 140 MMHG | HEIGHT: 62 IN | HEART RATE: 82 BPM | RESPIRATION RATE: 16 BRPM

## 2025-04-24 DIAGNOSIS — C25.0 MALIGNANT TUMOR OF HEAD OF PANCREAS: Primary | ICD-10-CM

## 2025-04-24 DIAGNOSIS — Z45.2 ENCOUNTER FOR CARE RELATED TO PORT-A-CATH: ICD-10-CM

## 2025-04-24 PROCEDURE — 25010000002 GEMCITABINE 200 MG/5.26ML SOLUTION 5.26 ML VIAL: Performed by: INTERNAL MEDICINE

## 2025-04-24 PROCEDURE — 25010000002 GEMCITABINE 1 GM/26.3ML SOLUTION 26.3 ML VIAL: Performed by: INTERNAL MEDICINE

## 2025-04-24 PROCEDURE — 96413 CHEMO IV INFUSION 1 HR: CPT

## 2025-04-24 PROCEDURE — 25010000002 PACLITAXEL PROTEIN-BOUND PART PER 1 MG: Performed by: INTERNAL MEDICINE

## 2025-04-24 PROCEDURE — 96417 CHEMO IV INFUS EACH ADDL SEQ: CPT

## 2025-04-24 PROCEDURE — 96375 TX/PRO/DX INJ NEW DRUG ADDON: CPT

## 2025-04-24 PROCEDURE — 25010000002 DEXAMETHASONE SODIUM PHOSPHATE 100 MG/10ML SOLUTION: Performed by: INTERNAL MEDICINE

## 2025-04-24 PROCEDURE — 25810000003 SODIUM CHLORIDE 0.9 % SOLUTION 250 ML FLEX CONT: Performed by: INTERNAL MEDICINE

## 2025-04-24 PROCEDURE — 25010000002 HEPARIN LOCK FLUSH PER 10 UNITS: Performed by: INTERNAL MEDICINE

## 2025-04-24 PROCEDURE — 25810000003 SODIUM CHLORIDE 0.9 % SOLUTION: Performed by: INTERNAL MEDICINE

## 2025-04-24 RX ORDER — HEPARIN SODIUM (PORCINE) LOCK FLUSH IV SOLN 100 UNIT/ML 100 UNIT/ML
500 SOLUTION INTRAVENOUS AS NEEDED
Status: DISCONTINUED | OUTPATIENT
Start: 2025-04-24 | End: 2025-04-26 | Stop reason: HOSPADM

## 2025-04-24 RX ORDER — SODIUM CHLORIDE 0.9 % (FLUSH) 0.9 %
20 SYRINGE (ML) INJECTION AS NEEDED
OUTPATIENT
Start: 2025-04-24

## 2025-04-24 RX ORDER — PACLITAXEL 100 MG/20ML
100 INJECTION, POWDER, LYOPHILIZED, FOR SUSPENSION INTRAVENOUS ONCE
Status: COMPLETED | OUTPATIENT
Start: 2025-04-24 | End: 2025-04-24

## 2025-04-24 RX ORDER — HEPARIN SODIUM (PORCINE) LOCK FLUSH IV SOLN 100 UNIT/ML 100 UNIT/ML
500 SOLUTION INTRAVENOUS AS NEEDED
OUTPATIENT
Start: 2025-04-24

## 2025-04-24 RX ORDER — SODIUM CHLORIDE 9 MG/ML
20 INJECTION, SOLUTION INTRAVENOUS ONCE
Status: COMPLETED | OUTPATIENT
Start: 2025-04-24 | End: 2025-04-24

## 2025-04-24 RX ORDER — SODIUM CHLORIDE 0.9 % (FLUSH) 0.9 %
10 SYRINGE (ML) INJECTION AS NEEDED
OUTPATIENT
Start: 2025-04-24

## 2025-04-24 RX ADMIN — PACLITAXEL 150 MG: 100 INJECTION, POWDER, LYOPHILIZED, FOR SUSPENSION INTRAVENOUS at 10:35

## 2025-04-24 RX ADMIN — DEXAMETHASONE SODIUM PHOSPHATE 12 MG: 10 INJECTION, SOLUTION INTRAMUSCULAR; INTRAVENOUS at 09:55

## 2025-04-24 RX ADMIN — SODIUM CHLORIDE 20 ML/HR: 9 INJECTION, SOLUTION INTRAVENOUS at 09:55

## 2025-04-24 RX ADMIN — GEMCITABINE HYDROCHLORIDE 1200 MG: 1 INJECTION, SOLUTION INTRAVENOUS at 11:30

## 2025-04-24 RX ADMIN — HEPARIN 500 UNITS: 100 SYRINGE at 12:05

## 2025-04-24 NOTE — LETTER
April 24, 2025     Jeannie Salgado MD  [address]  [address]  [place] [address]    Patient: Claudia Flores   YOB: 1950   Date of Visit: 4/24/2025     Dear Jeannie Salgado MD:       Thank you for referring Claudia Flores to me for evaluation. Below are the relevant portions of my assessment and plan of care.    If you have questions, please do not hesitate to call me. I look forward to following Claudia along with you.         Sincerely,        Ni Jackson MD        CC: No Recipients    Ni Jackson MD  04/24/25 0927  Sign when Signing Visit  PROBLEM LIST:  Oncology/Hematology History   Malignant tumor of head of pancreas   10/2/2024 Cancer Staged    Staging form: Exocrine Pancreas, AJCC 8th Edition  - Clinical stage from 10/2/2024: Stage IV (cT2, cN0, cM1) - Signed by Ni Jackson MD on 10/4/2024     10/3/2024 Initial Diagnosis    Malignant tumor of head of pancreas     10/10/2024 -  Chemotherapy    OP PANCREATIC PACLitaxel Protein-Bound / Gemcitabine         REASON FOR VISIT: Pancreatic cancer    HISTORY OF PRESENT ILLNESS:   74 y.o.  female presents today for follow-up of her pancreatic cancer.  She is undergoing chemotherapy with Abraxane and gemcitabine.  She is doing reasonably well with the dose reduce treatment so far.  It is unclear if she is having response so far.  Holding her treatment for a month because her numbers to worsen.  So I am hopeful that returning to treatment will continue to improve her disease.    Past medical history, social history and family history was reviewed 04/24/25 and unchanged from prior visit.    Review of Systems:    Review of Systems   Constitutional:  Positive for fatigue.   HENT:  Negative.     Eyes: Negative.    Respiratory: Negative.     Cardiovascular: Negative.    Gastrointestinal: Negative.    Endocrine: Negative.    Genitourinary: Negative.     Musculoskeletal: Negative.    Skin: Negative.    Neurological: Negative.     Hematological: Negative.    Psychiatric/Behavioral: Negative.              Medications:        Current Outpatient Medications:   •  amLODIPine-benazepril (LOTREL 5-10) 5-10 MG per capsule, Take 1 capsule by mouth Daily., Disp: , Rfl:   •  gabapentin (NEURONTIN) 300 MG capsule, Take 1 capsule by mouth 3 (Three) Times a Day., Disp: 90 capsule, Rfl: 0  •  levothyroxine (SYNTHROID, LEVOTHROID) 50 MCG tablet, Take 1 tablet by mouth., Disp: , Rfl:   •  lidocaine-prilocaine (EMLA) 2.5-2.5 % cream, Apply 1 Application topically to the appropriate area as directed As Needed (45-60 minutes prior to port access.  Cover with saran/plastic wrap)., Disp: 30 g, Rfl: 2  •  megestrol (MEGACE) 40 MG/ML suspension, Take 20 mL by mouth Daily., Disp: 480 mL, Rfl: 1  •  naloxone (NARCAN) 4 MG/0.1ML nasal spray, Administer 1 spray into the nostril(s) as directed by provider As Needed for Opioid Reversal or Respiratory Depression., Disp: 1 each, Rfl: 0  •  ondansetron (ZOFRAN) 8 MG tablet, Take 1 tablet by mouth 3 (Three) Times a Day As Needed for Nausea or Vomiting., Disp: 30 tablet, Rfl: 5  •  ondansetron ODT (ZOFRAN-ODT) 8 MG disintegrating tablet, Place 1 tablet on the tongue Every 8 (Eight) Hours As Needed for Nausea or Vomiting., Disp: 30 tablet, Rfl: 5  •  oxyCODONE (ROXICODONE) 10 MG tablet, Take 1 tablet by mouth Every 6 (Six) Hours As Needed for Moderate Pain or Severe Pain for up to 30 days., Disp: 120 tablet, Rfl: 0  •  Pancrelipase, Lip-Prot-Amyl, (CREON) 6288-2650 units capsule delayed-release particles capsule, Take 8 capsules by mouth 3 (Three) Times a Day With Meals., Disp: 900 capsule, Rfl: 5  •  rosuvastatin (CRESTOR) 5 MG tablet, Take 1 tablet by mouth Daily., Disp: , Rfl:     Pain Medications              gabapentin (NEURONTIN) 300 MG capsule Take 1 capsule by mouth 3 (Three) Times a Day.    oxyCODONE (ROXICODONE) 10 MG tablet Take 1 tablet by mouth Every 6 (Six) Hours As Needed for Moderate Pain or Severe Pain for  "up to 30 days.               ALLERGIES:  No Known Allergies      Physical Exam    VITAL SIGNS:  /60 Comment: LUE  Pulse 82   Temp 97.3 °F (36.3 °C) (Temporal)   Resp 16   Ht 157.5 cm (62\")   Wt 47.6 kg (105 lb)   SpO2 98% Comment: RA  BMI 19.20 kg/m²     ECOG score: 1           Wt Readings from Last 3 Encounters:   04/24/25 47.6 kg (105 lb)   04/22/25 47.2 kg (104 lb)   04/10/25 46.7 kg (103 lb)       Body mass index is 19.2 kg/m². Body surface area is 1.45 meters squared.       Performance Status: 1    General: well appearing, in no acute distress  HEENT: sclera anicteric, neck is supple  Lymphatics: no cervical, supraclavicular, or axillary adenopathy  Cardiovascular: regular rate and rhythm, no murmurs, rubs or gallops  Lungs: clear to auscultation bilaterally  Abdomen: soft, nontender, nondistended.  No palpable organomegaly  Extremities: no lower extremity edema  Skin: no rashes, lesions, bruising, or petechiae  Msk:  Shows no weakness of the large muscle groups  Psych: Mood is stable        RECENT LABS:    Lab Results   Component Value Date    HGB 9.5 (L) 04/22/2025    HCT 30.1 (L) 04/22/2025    MCV 95.9 04/22/2025     04/22/2025    WBC 6.34 04/22/2025    NEUTROABS 4.06 04/22/2025    LYMPHSABS 1.39 04/22/2025    MONOSABS 0.61 04/22/2025    EOSABS 0.22 04/22/2025    BASOSABS 0.04 04/22/2025       Lab Results   Component Value Date    GLUCOSE 122 (H) 04/22/2025    BUN 11 04/22/2025    CREATININE 0.59 04/22/2025     (L) 04/22/2025    K 3.9 04/22/2025     04/22/2025    CO2 26.0 04/22/2025    CALCIUM 8.5 (L) 04/22/2025    PROTEINTOT 5.9 (L) 04/22/2025    ALBUMIN 3.5 04/22/2025    BILITOT 0.2 04/22/2025    ALKPHOS 141 (H) 04/22/2025    AST 17 04/22/2025    ALT 11 04/22/2025     Lab Results   Component Value Date     1,200.0 (H) 04/08/2025     931.0 (H) 03/25/2025     1,956.0 (H) 02/25/2025     2,814.0 (H) 01/28/2025     3,752.0 (H) 12/31/2024     CT Abdomen " Pelvis With & Without Contrast    Result Date: 2/26/2025  Impression: 1.No significant change in the pancreatic head neoplasm as above 2.Stable mild intra and extrahepatic biliary ductal dilatation to the level of the pancreatic head mass. 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 4.Slight interval decrease in size of omental nodules. 5.No significant change in multiple hypodense liver lesions compatible with metastatic disease Electronically Signed: Harvey Alvarez MD  2/26/2025 9:50 AM EST  Workstation ID: [id number]    CT Abdomen Pelvis With & Without Contrast    Result Date: 12/30/2024  Impression: Primary pancreatic head neoplasm is grossly similar in size. Grossly similar innumerable hepatic metastases, omental nodularity, and mesenteric lymphadenopathy. Slight interval recanalization of the previously occluded portosplenomesenteric venous confluence. New small volume free fluid. Electronically Signed: Merrill Duncan MD  12/30/2024 6:17 PM EST  Workstation ID: [id number]        Assessment/Plan    1.  Stage IV adenocarcinoma of the pancreas.   Continue Abraxane and gemcitabine.  She has been dose reduced and tolerating it much better.  If she does have progressive disease I will likely offer her modified FOLFIRINOX.  For now we will see how she does going forward..    2.  Heartburn secondary to chemotherapy.  on PPI and baking soda with water.      3.  Constipation.  Improve constipation with MiraLAX twice a day along with stool softener.    4.  Chemotherapy-induced neuropathy.  Mostly numbness.  No significant pain for now.      Total time of patient care on day of service including time prior to, face to face with patient, and following visit spent in reviewing records, lab results, imaging studies, discussion with patient, and documentation/charting was >432 minutes.     Ni Jackson,  MD  [facility] Hematology and Oncology         No orders of the defined types were placed in this encounter.      4/24/2025     Future Appointments         Provider Department Center    4/24/2025 9:00 AM (Arrive by 8:45 AM) Ni Jackson MD [facility] HEMATOLOGY & ONCOLOGY TC    4/24/2025 9:30 AM ROOM D [facility] OUTPATIENT ONCOLOGY TC    6/9/2025 1:00 PM Michelle Arroyo PA-C [facility] PALLIATIVE CARE TC

## 2025-04-24 NOTE — PROGRESS NOTES
PROBLEM LIST:  Oncology/Hematology History   Malignant tumor of head of pancreas   10/2/2024 Cancer Staged    Staging form: Exocrine Pancreas, AJCC 8th Edition  - Clinical stage from 10/2/2024: Stage IV (cT2, cN0, cM1) - Signed by Ni Jackson MD on 10/4/2024     10/3/2024 Initial Diagnosis    Malignant tumor of head of pancreas     10/10/2024 -  Chemotherapy    OP PANCREATIC PACLitaxel Protein-Bound / Gemcitabine         REASON FOR VISIT: Pancreatic cancer    HISTORY OF PRESENT ILLNESS:   74 y.o.  female presents today for follow-up of her pancreatic cancer.  She is undergoing chemotherapy with Abraxane and gemcitabine.  She is doing reasonably well with the dose reduce treatment so far.  It is unclear if she is having response so far.  Holding her treatment for a month because her numbers to worsen.  So I am hopeful that returning to treatment will continue to improve her disease.    Past medical history, social history and family history was reviewed 04/24/25 and unchanged from prior visit.    Review of Systems:    Review of Systems   Constitutional:  Positive for fatigue.   HENT:  Negative.     Eyes: Negative.    Respiratory: Negative.     Cardiovascular: Negative.    Gastrointestinal: Negative.    Endocrine: Negative.    Genitourinary: Negative.     Musculoskeletal: Negative.    Skin: Negative.    Neurological: Negative.    Hematological: Negative.    Psychiatric/Behavioral: Negative.              Medications:        Current Outpatient Medications:     amLODIPine-benazepril (LOTREL 5-10) 5-10 MG per capsule, Take 1 capsule by mouth Daily., Disp: , Rfl:     gabapentin (NEURONTIN) 300 MG capsule, Take 1 capsule by mouth 3 (Three) Times a Day., Disp: 90 capsule, Rfl: 0    levothyroxine (SYNTHROID, LEVOTHROID) 50 MCG tablet, Take 1 tablet by mouth., Disp: , Rfl:     lidocaine-prilocaine (EMLA) 2.5-2.5 % cream, Apply 1 Application topically to the appropriate area as directed As Needed (45-60 minutes prior  "to port access.  Cover with saran/plastic wrap)., Disp: 30 g, Rfl: 2    megestrol (MEGACE) 40 MG/ML suspension, Take 20 mL by mouth Daily., Disp: 480 mL, Rfl: 1    naloxone (NARCAN) 4 MG/0.1ML nasal spray, Administer 1 spray into the nostril(s) as directed by provider As Needed for Opioid Reversal or Respiratory Depression., Disp: 1 each, Rfl: 0    ondansetron (ZOFRAN) 8 MG tablet, Take 1 tablet by mouth 3 (Three) Times a Day As Needed for Nausea or Vomiting., Disp: 30 tablet, Rfl: 5    ondansetron ODT (ZOFRAN-ODT) 8 MG disintegrating tablet, Place 1 tablet on the tongue Every 8 (Eight) Hours As Needed for Nausea or Vomiting., Disp: 30 tablet, Rfl: 5    oxyCODONE (ROXICODONE) 10 MG tablet, Take 1 tablet by mouth Every 6 (Six) Hours As Needed for Moderate Pain or Severe Pain for up to 30 days., Disp: 120 tablet, Rfl: 0    Pancrelipase, Lip-Prot-Amyl, (CREON) 7121-7829 units capsule delayed-release particles capsule, Take 8 capsules by mouth 3 (Three) Times a Day With Meals., Disp: 900 capsule, Rfl: 5    rosuvastatin (CRESTOR) 5 MG tablet, Take 1 tablet by mouth Daily., Disp: , Rfl:     Pain Medications              gabapentin (NEURONTIN) 300 MG capsule Take 1 capsule by mouth 3 (Three) Times a Day.    oxyCODONE (ROXICODONE) 10 MG tablet Take 1 tablet by mouth Every 6 (Six) Hours As Needed for Moderate Pain or Severe Pain for up to 30 days.               ALLERGIES:  No Known Allergies      Physical Exam    VITAL SIGNS:  /60 Comment: LUE  Pulse 82   Temp 97.3 °F (36.3 °C) (Temporal)   Resp 16   Ht 157.5 cm (62\")   Wt 47.6 kg (105 lb)   SpO2 98% Comment: RA  BMI 19.20 kg/m²     ECOG score: 1           Wt Readings from Last 3 Encounters:   04/24/25 47.6 kg (105 lb)   04/22/25 47.2 kg (104 lb)   04/10/25 46.7 kg (103 lb)       Body mass index is 19.2 kg/m². Body surface area is 1.45 meters squared.       Performance Status: 1    General: well appearing, in no acute distress  HEENT: sclera anicteric, neck is " supple  Lymphatics: no cervical, supraclavicular, or axillary adenopathy  Cardiovascular: regular rate and rhythm, no murmurs, rubs or gallops  Lungs: clear to auscultation bilaterally  Abdomen: soft, nontender, nondistended.  No palpable organomegaly  Extremities: no lower extremity edema  Skin: no rashes, lesions, bruising, or petechiae  Msk:  Shows no weakness of the large muscle groups  Psych: Mood is stable        RECENT LABS:    Lab Results   Component Value Date    HGB 9.5 (L) 04/22/2025    HCT 30.1 (L) 04/22/2025    MCV 95.9 04/22/2025     04/22/2025    WBC 6.34 04/22/2025    NEUTROABS 4.06 04/22/2025    LYMPHSABS 1.39 04/22/2025    MONOSABS 0.61 04/22/2025    EOSABS 0.22 04/22/2025    BASOSABS 0.04 04/22/2025       Lab Results   Component Value Date    GLUCOSE 122 (H) 04/22/2025    BUN 11 04/22/2025    CREATININE 0.59 04/22/2025     (L) 04/22/2025    K 3.9 04/22/2025     04/22/2025    CO2 26.0 04/22/2025    CALCIUM 8.5 (L) 04/22/2025    PROTEINTOT 5.9 (L) 04/22/2025    ALBUMIN 3.5 04/22/2025    BILITOT 0.2 04/22/2025    ALKPHOS 141 (H) 04/22/2025    AST 17 04/22/2025    ALT 11 04/22/2025     Lab Results   Component Value Date     1,200.0 (H) 04/08/2025     931.0 (H) 03/25/2025     1,956.0 (H) 02/25/2025     2,814.0 (H) 01/28/2025     3,752.0 (H) 12/31/2024     CT Abdomen Pelvis With & Without Contrast    Result Date: 2/26/2025  Impression: 1.No significant change in the pancreatic head neoplasm as above 2.Stable mild intra and extrahepatic biliary ductal dilatation to the level of the pancreatic head mass. 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 4.Slight interval decrease in size of omental nodules. 5.No significant change in multiple hypodense liver lesions compatible with metastatic disease Electronically Signed: Harvey Alvarez MD   2/26/2025 9:50 AM EST  Workstation ID: [id number]    CT Abdomen Pelvis With & Without Contrast    Result Date: 12/30/2024  Impression: Primary pancreatic head neoplasm is grossly similar in size. Grossly similar innumerable hepatic metastases, omental nodularity, and mesenteric lymphadenopathy. Slight interval recanalization of the previously occluded portosplenomesenteric venous confluence. New small volume free fluid. Electronically Signed: Merrill Duncan MD  12/30/2024 6:17 PM EST  Workstation ID: [id number]        Assessment/Plan    1.  Stage IV adenocarcinoma of the pancreas.   Continue Abraxane and gemcitabine.  She has been dose reduced and tolerating it much better.  If she does have progressive disease I will likely offer her modified FOLFIRINOX.  For now we will see how she does going forward..    2.  Heartburn secondary to chemotherapy.  on PPI and baking soda with water.      3.  Constipation.  Improve constipation with MiraLAX twice a day along with stool softener.    4.  Chemotherapy-induced neuropathy.  Mostly numbness.  No significant pain for now.      Total time of patient care on day of service including time prior to, face to face with patient, and following visit spent in reviewing records, lab results, imaging studies, discussion with patient, and documentation/charting was >432 minutes.     Ni Jackson MD  [facility] Hematology and Oncology         No orders of the defined types were placed in this encounter.      4/24/2025     Future Appointments         Provider Department Center    4/24/2025 9:00 AM (Arrive by 8:45 AM) Ni Jackson MD [facility] HEMATOLOGY & ONCOLOGY TC    4/24/2025 9:30 AM ROOM D [facility] OUTPATIENT ONCOLOGY TC    6/9/2025 1:00 PM Michelle Arroyo PA-C [facility] PALLIATIVE CARE TC

## 2025-05-06 ENCOUNTER — HOSPITAL ENCOUNTER (OUTPATIENT)
Dept: ONCOLOGY | Facility: HOSPITAL | Age: 75
Discharge: HOME | End: 2025-05-06
Admitting: INTERNAL MEDICINE
Payer: MEDICARE

## 2025-05-06 VITALS
SYSTOLIC BLOOD PRESSURE: 120 MMHG | BODY MASS INDEX: 19.14 KG/M2 | WEIGHT: 104 LBS | HEART RATE: 77 BPM | RESPIRATION RATE: 16 BRPM | TEMPERATURE: 96.8 F | DIASTOLIC BLOOD PRESSURE: 54 MMHG | HEIGHT: 62 IN

## 2025-05-06 DIAGNOSIS — Z45.2 ENCOUNTER FOR CARE RELATED TO PORT-A-CATH: Primary | ICD-10-CM

## 2025-05-06 DIAGNOSIS — C25.0 MALIGNANT TUMOR OF HEAD OF PANCREAS: ICD-10-CM

## 2025-05-06 LAB
ALBUMIN SERPL-MCNC: 3.7 G/DL (ref 3.5–5.2)
ALBUMIN/GLOB SERPL: 1.5 G/DL
ALP SERPL-CCNC: 139 U/L (ref 39–117)
ALT SERPL W P-5'-P-CCNC: 12 U/L (ref 1–33)
ANION GAP SERPL CALCULATED.3IONS-SCNC: 11 MMOL/L (ref 5–15)
AST SERPL-CCNC: 17 U/L (ref 1–32)
BASOPHILS # BLD AUTO: 0.05 10*3/MM3 (ref 0–0.2)
BASOPHILS NFR BLD AUTO: 0.9 % (ref 0–1.5)
BILIRUB SERPL-MCNC: 0.3 MG/DL (ref 0–1.2)
BUN SERPL-MCNC: 13 MG/DL (ref 8–23)
BUN/CREAT SERPL: 23.6 (ref 7–25)
CALCIUM SPEC-SCNC: 8.9 MG/DL (ref 8.6–10.5)
CHLORIDE SERPL-SCNC: 102 MMOL/L (ref 98–107)
CO2 SERPL-SCNC: 22 MMOL/L (ref 22–29)
CREAT SERPL-MCNC: 0.55 MG/DL (ref 0.57–1)
DEPRECATED RDW RBC AUTO: 57.5 FL (ref 37–54)
EGFRCR SERPLBLD CKD-EPI 2021: 95.1 ML/MIN/1.73
EOSINOPHIL # BLD AUTO: 0.22 10*3/MM3 (ref 0–0.4)
EOSINOPHIL NFR BLD AUTO: 4.1 % (ref 0.3–6.2)
ERYTHROCYTE [DISTWIDTH] IN BLOOD BY AUTOMATED COUNT: 16.2 % (ref 12.3–15.4)
GLOBULIN UR ELPH-MCNC: 2.5 GM/DL
GLUCOSE SERPL-MCNC: 116 MG/DL (ref 65–99)
HCT VFR BLD AUTO: 30.2 % (ref 34–46.6)
HGB BLD-MCNC: 9.8 G/DL (ref 12–15.9)
IMM GRANULOCYTES # BLD AUTO: 0.01 10*3/MM3 (ref 0–0.05)
IMM GRANULOCYTES NFR BLD AUTO: 0.2 % (ref 0–0.5)
LYMPHOCYTES # BLD AUTO: 1.53 10*3/MM3 (ref 0.7–3.1)
LYMPHOCYTES NFR BLD AUTO: 28.3 % (ref 19.6–45.3)
MCH RBC QN AUTO: 30.5 PG (ref 26.6–33)
MCHC RBC AUTO-ENTMCNC: 32.5 G/DL (ref 31.5–35.7)
MCV RBC AUTO: 94.1 FL (ref 79–97)
MONOCYTES # BLD AUTO: 0.69 10*3/MM3 (ref 0.1–0.9)
MONOCYTES NFR BLD AUTO: 12.8 % (ref 5–12)
NEUTROPHILS NFR BLD AUTO: 2.91 10*3/MM3 (ref 1.7–7)
NEUTROPHILS NFR BLD AUTO: 53.7 % (ref 42.7–76)
PLATELET # BLD AUTO: 189 10*3/MM3 (ref 140–450)
PMV BLD AUTO: 9.9 FL (ref 6–12)
POTASSIUM SERPL-SCNC: 4.1 MMOL/L (ref 3.5–5.2)
PROT SERPL-MCNC: 6.2 G/DL (ref 6–8.5)
RBC # BLD AUTO: 3.21 10*6/MM3 (ref 3.77–5.28)
SODIUM SERPL-SCNC: 135 MMOL/L (ref 136–145)
WBC NRBC COR # BLD AUTO: 5.41 10*3/MM3 (ref 3.4–10.8)

## 2025-05-06 PROCEDURE — 86301 IMMUNOASSAY TUMOR CA 19-9: CPT | Performed by: INTERNAL MEDICINE

## 2025-05-06 PROCEDURE — 36591 DRAW BLOOD OFF VENOUS DEVICE: CPT

## 2025-05-06 PROCEDURE — 25010000002 HEPARIN LOCK FLUSH PER 10 UNITS: Performed by: INTERNAL MEDICINE

## 2025-05-06 PROCEDURE — 80053 COMPREHEN METABOLIC PANEL: CPT | Performed by: INTERNAL MEDICINE

## 2025-05-06 PROCEDURE — 85025 COMPLETE CBC W/AUTO DIFF WBC: CPT | Performed by: INTERNAL MEDICINE

## 2025-05-06 RX ORDER — SODIUM CHLORIDE 0.9 % (FLUSH) 0.9 %
10 SYRINGE (ML) INJECTION AS NEEDED
OUTPATIENT
Start: 2025-05-06

## 2025-05-06 RX ORDER — HEPARIN SODIUM (PORCINE) LOCK FLUSH IV SOLN 100 UNIT/ML 100 UNIT/ML
500 SOLUTION INTRAVENOUS AS NEEDED
Status: CANCELLED | OUTPATIENT
Start: 2025-05-06

## 2025-05-06 RX ORDER — SODIUM CHLORIDE 0.9 % (FLUSH) 0.9 %
20 SYRINGE (ML) INJECTION AS NEEDED
OUTPATIENT
Start: 2025-05-06

## 2025-05-06 RX ORDER — HEPARIN SODIUM (PORCINE) LOCK FLUSH IV SOLN 100 UNIT/ML 100 UNIT/ML
500 SOLUTION INTRAVENOUS AS NEEDED
Status: DISCONTINUED | OUTPATIENT
Start: 2025-05-06 | End: 2025-05-07 | Stop reason: HOSPADM

## 2025-05-06 RX ADMIN — HEPARIN 500 UNITS: 100 SYRINGE at 15:05

## 2025-05-07 LAB — CANCER AG19-9 SERPL-ACNC: 625 U/ML

## 2025-05-08 ENCOUNTER — HOSPITAL ENCOUNTER (OUTPATIENT)
Dept: ONCOLOGY | Facility: HOSPITAL | Age: 75
Discharge: HOME | End: 2025-05-08
Admitting: INTERNAL MEDICINE
Payer: MEDICARE

## 2025-05-08 VITALS
RESPIRATION RATE: 16 BRPM | BODY MASS INDEX: 18.77 KG/M2 | DIASTOLIC BLOOD PRESSURE: 51 MMHG | HEIGHT: 62 IN | WEIGHT: 102 LBS | HEART RATE: 84 BPM | TEMPERATURE: 96.9 F | SYSTOLIC BLOOD PRESSURE: 109 MMHG

## 2025-05-08 DIAGNOSIS — C25.0 MALIGNANT TUMOR OF HEAD OF PANCREAS: Primary | ICD-10-CM

## 2025-05-08 DIAGNOSIS — Z45.2 ENCOUNTER FOR CARE RELATED TO PORT-A-CATH: ICD-10-CM

## 2025-05-08 PROCEDURE — 25010000002 GEMCITABINE 1 GM/26.3ML SOLUTION 26.3 ML VIAL: Performed by: INTERNAL MEDICINE

## 2025-05-08 PROCEDURE — 25010000002 DEXAMETHASONE SODIUM PHOSPHATE 100 MG/10ML SOLUTION: Performed by: INTERNAL MEDICINE

## 2025-05-08 PROCEDURE — 25010000002 GEMCITABINE 200 MG/5.26ML SOLUTION 5.26 ML VIAL: Performed by: INTERNAL MEDICINE

## 2025-05-08 PROCEDURE — 25810000003 SODIUM CHLORIDE 0.9 % SOLUTION 250 ML FLEX CONT: Performed by: INTERNAL MEDICINE

## 2025-05-08 PROCEDURE — 25010000002 PACLITAXEL PROTEIN-BOUND PART PER 1 MG: Performed by: INTERNAL MEDICINE

## 2025-05-08 PROCEDURE — 96413 CHEMO IV INFUSION 1 HR: CPT

## 2025-05-08 PROCEDURE — 96417 CHEMO IV INFUS EACH ADDL SEQ: CPT

## 2025-05-08 PROCEDURE — 96375 TX/PRO/DX INJ NEW DRUG ADDON: CPT

## 2025-05-08 PROCEDURE — 25010000002 HEPARIN LOCK FLUSH PER 10 UNITS: Performed by: INTERNAL MEDICINE

## 2025-05-08 RX ORDER — SODIUM CHLORIDE 0.9 % (FLUSH) 0.9 %
20 SYRINGE (ML) INJECTION AS NEEDED
OUTPATIENT
Start: 2025-05-08

## 2025-05-08 RX ORDER — SODIUM CHLORIDE 9 MG/ML
20 INJECTION, SOLUTION INTRAVENOUS ONCE
Status: DISCONTINUED | OUTPATIENT
Start: 2025-05-08 | End: 2025-05-09 | Stop reason: HOSPADM

## 2025-05-08 RX ORDER — SODIUM CHLORIDE 0.9 % (FLUSH) 0.9 %
10 SYRINGE (ML) INJECTION AS NEEDED
OUTPATIENT
Start: 2025-05-08

## 2025-05-08 RX ORDER — HEPARIN SODIUM (PORCINE) LOCK FLUSH IV SOLN 100 UNIT/ML 100 UNIT/ML
500 SOLUTION INTRAVENOUS AS NEEDED
Status: DISCONTINUED | OUTPATIENT
Start: 2025-05-08 | End: 2025-05-09 | Stop reason: HOSPADM

## 2025-05-08 RX ORDER — PACLITAXEL 100 MG/20ML
100 INJECTION, POWDER, LYOPHILIZED, FOR SUSPENSION INTRAVENOUS ONCE
Status: COMPLETED | OUTPATIENT
Start: 2025-05-08 | End: 2025-05-08

## 2025-05-08 RX ORDER — HEPARIN SODIUM (PORCINE) LOCK FLUSH IV SOLN 100 UNIT/ML 100 UNIT/ML
500 SOLUTION INTRAVENOUS AS NEEDED
OUTPATIENT
Start: 2025-05-08

## 2025-05-08 RX ADMIN — PACLITAXEL 150 MG: 100 INJECTION, POWDER, LYOPHILIZED, FOR SUSPENSION INTRAVENOUS at 11:29

## 2025-05-08 RX ADMIN — GEMCITABINE 1200 MG: 38 INJECTION, SOLUTION INTRAVENOUS at 12:26

## 2025-05-08 RX ADMIN — HEPARIN 500 UNITS: 100 SYRINGE at 13:03

## 2025-05-08 RX ADMIN — DEXAMETHASONE SODIUM PHOSPHATE 12 MG: 10 INJECTION, SOLUTION INTRAMUSCULAR; INTRAVENOUS at 10:47

## 2025-05-20 ENCOUNTER — HOSPITAL ENCOUNTER (OUTPATIENT)
Dept: ONCOLOGY | Facility: HOSPITAL | Age: 75
Discharge: HOME | End: 2025-05-20
Admitting: INTERNAL MEDICINE
Payer: MEDICARE

## 2025-05-20 VITALS
HEART RATE: 80 BPM | BODY MASS INDEX: 18.77 KG/M2 | WEIGHT: 102 LBS | HEIGHT: 62 IN | TEMPERATURE: 97.4 F | SYSTOLIC BLOOD PRESSURE: 113 MMHG | RESPIRATION RATE: 18 BRPM | DIASTOLIC BLOOD PRESSURE: 56 MMHG

## 2025-05-20 DIAGNOSIS — C25.0 MALIGNANT TUMOR OF HEAD OF PANCREAS: Primary | ICD-10-CM

## 2025-05-20 DIAGNOSIS — Z45.2 ENCOUNTER FOR CARE RELATED TO PORT-A-CATH: ICD-10-CM

## 2025-05-20 LAB
ALBUMIN SERPL-MCNC: 3.7 G/DL (ref 3.5–5.2)
ALBUMIN/GLOB SERPL: 1.5 G/DL
ALP SERPL-CCNC: 144 U/L (ref 39–117)
ALT SERPL W P-5'-P-CCNC: 12 U/L (ref 1–33)
ANION GAP SERPL CALCULATED.3IONS-SCNC: 8 MMOL/L (ref 5–15)
AST SERPL-CCNC: 19 U/L (ref 1–32)
BASOPHILS # BLD AUTO: 0.03 10*3/MM3 (ref 0–0.2)
BASOPHILS NFR BLD AUTO: 0.6 % (ref 0–1.5)
BILIRUB SERPL-MCNC: 0.3 MG/DL (ref 0–1.2)
BUN SERPL-MCNC: 9 MG/DL (ref 8–23)
BUN/CREAT SERPL: 19.6 (ref 7–25)
CALCIUM SPEC-SCNC: 8.6 MG/DL (ref 8.6–10.5)
CHLORIDE SERPL-SCNC: 101 MMOL/L (ref 98–107)
CO2 SERPL-SCNC: 25 MMOL/L (ref 22–29)
CREAT SERPL-MCNC: 0.46 MG/DL (ref 0.57–1)
DEPRECATED RDW RBC AUTO: 57.1 FL (ref 37–54)
EGFRCR SERPLBLD CKD-EPI 2021: 99.3 ML/MIN/1.73
EOSINOPHIL # BLD AUTO: 0.19 10*3/MM3 (ref 0–0.4)
EOSINOPHIL NFR BLD AUTO: 3.6 % (ref 0.3–6.2)
ERYTHROCYTE [DISTWIDTH] IN BLOOD BY AUTOMATED COUNT: 16.5 % (ref 12.3–15.4)
GLOBULIN UR ELPH-MCNC: 2.5 GM/DL
GLUCOSE SERPL-MCNC: 105 MG/DL (ref 65–99)
HCT VFR BLD AUTO: 31.4 % (ref 34–46.6)
HGB BLD-MCNC: 10.1 G/DL (ref 12–15.9)
IMM GRANULOCYTES # BLD AUTO: 0.01 10*3/MM3 (ref 0–0.05)
IMM GRANULOCYTES NFR BLD AUTO: 0.2 % (ref 0–0.5)
LYMPHOCYTES # BLD AUTO: 1.37 10*3/MM3 (ref 0.7–3.1)
LYMPHOCYTES NFR BLD AUTO: 25.9 % (ref 19.6–45.3)
MCH RBC QN AUTO: 30.4 PG (ref 26.6–33)
MCHC RBC AUTO-ENTMCNC: 32.2 G/DL (ref 31.5–35.7)
MCV RBC AUTO: 94.6 FL (ref 79–97)
MONOCYTES # BLD AUTO: 0.73 10*3/MM3 (ref 0.1–0.9)
MONOCYTES NFR BLD AUTO: 13.8 % (ref 5–12)
NEUTROPHILS NFR BLD AUTO: 2.95 10*3/MM3 (ref 1.7–7)
NEUTROPHILS NFR BLD AUTO: 55.9 % (ref 42.7–76)
PLATELET # BLD AUTO: 200 10*3/MM3 (ref 140–450)
PMV BLD AUTO: 9.7 FL (ref 6–12)
POTASSIUM SERPL-SCNC: 4.1 MMOL/L (ref 3.5–5.2)
PROT SERPL-MCNC: 6.2 G/DL (ref 6–8.5)
RBC # BLD AUTO: 3.32 10*6/MM3 (ref 3.77–5.28)
SODIUM SERPL-SCNC: 134 MMOL/L (ref 136–145)
WBC NRBC COR # BLD AUTO: 5.28 10*3/MM3 (ref 3.4–10.8)

## 2025-05-20 PROCEDURE — 80053 COMPREHEN METABOLIC PANEL: CPT | Performed by: INTERNAL MEDICINE

## 2025-05-20 PROCEDURE — 25010000002 HEPARIN LOCK FLUSH PER 10 UNITS: Performed by: INTERNAL MEDICINE

## 2025-05-20 PROCEDURE — 85025 COMPLETE CBC W/AUTO DIFF WBC: CPT | Performed by: INTERNAL MEDICINE

## 2025-05-20 PROCEDURE — 36591 DRAW BLOOD OFF VENOUS DEVICE: CPT

## 2025-05-20 RX ORDER — SODIUM CHLORIDE 0.9 % (FLUSH) 0.9 %
20 SYRINGE (ML) INJECTION AS NEEDED
Status: CANCELLED | OUTPATIENT
Start: 2025-05-20

## 2025-05-20 RX ORDER — SODIUM CHLORIDE 0.9 % (FLUSH) 0.9 %
10 SYRINGE (ML) INJECTION AS NEEDED
Status: DISCONTINUED | OUTPATIENT
Start: 2025-05-20 | End: 2025-05-21 | Stop reason: HOSPADM

## 2025-05-20 RX ORDER — SODIUM CHLORIDE 0.9 % (FLUSH) 0.9 %
10 SYRINGE (ML) INJECTION AS NEEDED
Status: CANCELLED | OUTPATIENT
Start: 2025-05-20

## 2025-05-20 RX ORDER — HEPARIN SODIUM (PORCINE) LOCK FLUSH IV SOLN 100 UNIT/ML 100 UNIT/ML
500 SOLUTION INTRAVENOUS AS NEEDED
Status: CANCELLED | OUTPATIENT
Start: 2025-05-20

## 2025-05-20 RX ORDER — HEPARIN SODIUM (PORCINE) LOCK FLUSH IV SOLN 100 UNIT/ML 100 UNIT/ML
500 SOLUTION INTRAVENOUS AS NEEDED
Status: DISCONTINUED | OUTPATIENT
Start: 2025-05-20 | End: 2025-05-21 | Stop reason: HOSPADM

## 2025-05-20 RX ADMIN — Medication 10 ML: at 14:36

## 2025-05-20 RX ADMIN — HEPARIN 500 UNITS: 100 SYRINGE at 14:36

## 2025-05-22 ENCOUNTER — OFFICE VISIT (OUTPATIENT)
Dept: ONCOLOGY | Facility: CLINIC | Age: 75
End: 2025-05-22
Payer: MEDICARE

## 2025-05-22 ENCOUNTER — HOSPITAL ENCOUNTER (OUTPATIENT)
Dept: ONCOLOGY | Facility: HOSPITAL | Age: 75
Discharge: HOME | End: 2025-05-22
Admitting: INTERNAL MEDICINE
Payer: MEDICARE

## 2025-05-22 VITALS
OXYGEN SATURATION: 98 % | TEMPERATURE: 97.5 F | SYSTOLIC BLOOD PRESSURE: 135 MMHG | BODY MASS INDEX: 18.58 KG/M2 | WEIGHT: 101 LBS | DIASTOLIC BLOOD PRESSURE: 63 MMHG | HEIGHT: 62 IN | RESPIRATION RATE: 20 BRPM | HEART RATE: 84 BPM

## 2025-05-22 DIAGNOSIS — C25.0 MALIGNANT TUMOR OF HEAD OF PANCREAS: Primary | ICD-10-CM

## 2025-05-22 DIAGNOSIS — Z45.2 ENCOUNTER FOR CARE RELATED TO PORT-A-CATH: ICD-10-CM

## 2025-05-22 PROCEDURE — 25010000002 GEMCITABINE 1 GM/26.3ML SOLUTION 26.3 ML VIAL: Performed by: INTERNAL MEDICINE

## 2025-05-22 PROCEDURE — 25010000002 GEMCITABINE 200 MG/5.26ML SOLUTION 5.26 ML VIAL: Performed by: INTERNAL MEDICINE

## 2025-05-22 PROCEDURE — 25010000002 PACLITAXEL PROTEIN-BOUND PART PER 1 MG: Performed by: INTERNAL MEDICINE

## 2025-05-22 PROCEDURE — 96413 CHEMO IV INFUSION 1 HR: CPT

## 2025-05-22 PROCEDURE — 25010000002 HEPARIN LOCK FLUSH PER 10 UNITS: Performed by: INTERNAL MEDICINE

## 2025-05-22 PROCEDURE — 96417 CHEMO IV INFUS EACH ADDL SEQ: CPT

## 2025-05-22 PROCEDURE — 96376 TX/PRO/DX INJ SAME DRUG ADON: CPT

## 2025-05-22 PROCEDURE — 25010000002 DEXAMETHASONE SODIUM PHOSPHATE 100 MG/10ML SOLUTION: Performed by: INTERNAL MEDICINE

## 2025-05-22 PROCEDURE — 25810000003 SODIUM CHLORIDE 0.9 % SOLUTION 250 ML FLEX CONT: Performed by: INTERNAL MEDICINE

## 2025-05-22 RX ORDER — PACLITAXEL 100 MG/20ML
90 INJECTION, POWDER, LYOPHILIZED, FOR SUSPENSION INTRAVENOUS ONCE
Status: COMPLETED | OUTPATIENT
Start: 2025-05-22 | End: 2025-05-22

## 2025-05-22 RX ORDER — SODIUM CHLORIDE 0.9 % (FLUSH) 0.9 %
10 SYRINGE (ML) INJECTION AS NEEDED
Status: DISCONTINUED | OUTPATIENT
Start: 2025-05-22 | End: 2025-05-23 | Stop reason: HOSPADM

## 2025-05-22 RX ORDER — HEPARIN SODIUM (PORCINE) LOCK FLUSH IV SOLN 100 UNIT/ML 100 UNIT/ML
500 SOLUTION INTRAVENOUS AS NEEDED
Status: DISCONTINUED | OUTPATIENT
Start: 2025-05-22 | End: 2025-05-23 | Stop reason: HOSPADM

## 2025-05-22 RX ORDER — PACLITAXEL 100 MG/20ML
90 INJECTION, POWDER, LYOPHILIZED, FOR SUSPENSION INTRAVENOUS ONCE
OUTPATIENT
Start: 2025-06-19

## 2025-05-22 RX ORDER — SODIUM CHLORIDE 0.9 % (FLUSH) 0.9 %
10 SYRINGE (ML) INJECTION AS NEEDED
OUTPATIENT
Start: 2025-05-22

## 2025-05-22 RX ORDER — HEPARIN SODIUM (PORCINE) LOCK FLUSH IV SOLN 100 UNIT/ML 100 UNIT/ML
500 SOLUTION INTRAVENOUS AS NEEDED
OUTPATIENT
Start: 2025-05-22

## 2025-05-22 RX ORDER — PACLITAXEL 100 MG/20ML
90 INJECTION, POWDER, LYOPHILIZED, FOR SUSPENSION INTRAVENOUS ONCE
OUTPATIENT
Start: 2025-06-05

## 2025-05-22 RX ORDER — PACLITAXEL 100 MG/20ML
90 INJECTION, POWDER, LYOPHILIZED, FOR SUSPENSION INTRAVENOUS ONCE
Status: CANCELLED | OUTPATIENT
Start: 2025-05-22

## 2025-05-22 RX ORDER — SODIUM CHLORIDE 9 MG/ML
20 INJECTION, SOLUTION INTRAVENOUS ONCE
OUTPATIENT
Start: 2025-06-05

## 2025-05-22 RX ORDER — SODIUM CHLORIDE 0.9 % (FLUSH) 0.9 %
20 SYRINGE (ML) INJECTION AS NEEDED
OUTPATIENT
Start: 2025-05-22

## 2025-05-22 RX ORDER — SODIUM CHLORIDE 9 MG/ML
20 INJECTION, SOLUTION INTRAVENOUS ONCE
OUTPATIENT
Start: 2025-06-19

## 2025-05-22 RX ADMIN — DEXAMETHASONE SODIUM PHOSPHATE 12 MG: 10 INJECTION, SOLUTION INTRAMUSCULAR; INTRAVENOUS at 10:02

## 2025-05-22 RX ADMIN — Medication 10 ML: at 12:02

## 2025-05-22 RX ADMIN — GEMCITABINE 1150 MG: 38 INJECTION, SOLUTION INTRAVENOUS at 11:26

## 2025-05-22 RX ADMIN — HEPARIN 500 UNITS: 100 SYRINGE at 12:02

## 2025-05-22 RX ADMIN — PACLITAXEL 135 MG: 100 INJECTION, POWDER, LYOPHILIZED, FOR SUSPENSION INTRAVENOUS at 10:42

## 2025-05-22 NOTE — ADDENDUM NOTE
Encounter addended by: America Garcia RN on: 5/22/2025 12:26 PM   Actions taken: MAR administration edited, MAR administration accepted, Flowsheet accepted

## 2025-05-22 NOTE — PROGRESS NOTES
PROBLEM LIST:  Oncology/Hematology History   Malignant tumor of head of pancreas   10/2/2024 Cancer Staged    Staging form: Exocrine Pancreas, AJCC 8th Edition  - Clinical stage from 10/2/2024: Stage IV (cT2, cN0, cM1) - Signed by Ni Jackson MD on 10/4/2024     10/3/2024 Initial Diagnosis    Malignant tumor of head of pancreas     10/10/2024 -  Chemotherapy    OP PANCREATIC PACLitaxel Protein-Bound / Gemcitabine         REASON FOR VISIT: Pancreatic cancer    HISTORY OF PRESENT ILLNESS:   74 y.o.  female presents today for follow-up of her pancreatic cancer.  She is undergoing chemotherapy with Abraxane and gemcitabine.  Doing well clinically.  Still having considerable fatigue.  Overall no infections.  Pain seems relatively controlled.    Past medical history, social history and family history was reviewed 05/22/25 and unchanged from prior visit.    Review of Systems:    Review of Systems   Constitutional:  Positive for fatigue.   HENT:  Negative.     Eyes: Negative.    Respiratory: Negative.     Cardiovascular: Negative.    Gastrointestinal: Negative.    Endocrine: Negative.    Genitourinary: Negative.     Musculoskeletal: Negative.    Skin: Negative.    Neurological: Negative.    Hematological: Negative.    Psychiatric/Behavioral: Negative.              Medications:        Current Outpatient Medications:     amLODIPine-benazepril (LOTREL 5-10) 5-10 MG per capsule, Take 1 capsule by mouth Daily., Disp: , Rfl:     gabapentin (NEURONTIN) 300 MG capsule, Take 1 capsule by mouth 3 (Three) Times a Day., Disp: 90 capsule, Rfl: 0    levothyroxine (SYNTHROID, LEVOTHROID) 50 MCG tablet, Take 1 tablet by mouth., Disp: , Rfl:     lidocaine-prilocaine (EMLA) 2.5-2.5 % cream, Apply 1 Application topically to the appropriate area as directed As Needed (45-60 minutes prior to port access.  Cover with saran/plastic wrap)., Disp: 30 g, Rfl: 2    megestrol (MEGACE) 40 MG/ML suspension, Take 20 mL by mouth Daily., Disp:  480 mL, Rfl: 1    naloxone (NARCAN) 4 MG/0.1ML nasal spray, Administer 1 spray into the nostril(s) as directed by provider As Needed for Opioid Reversal or Respiratory Depression., Disp: 1 each, Rfl: 0    ondansetron (ZOFRAN) 8 MG tablet, Take 1 tablet by mouth 3 (Three) Times a Day As Needed for Nausea or Vomiting., Disp: 30 tablet, Rfl: 5    ondansetron ODT (ZOFRAN-ODT) 8 MG disintegrating tablet, Place 1 tablet on the tongue Every 8 (Eight) Hours As Needed for Nausea or Vomiting., Disp: 30 tablet, Rfl: 5    Pancrelipase, Lip-Prot-Amyl, (CREON) 5302-3436 units capsule delayed-release particles capsule, Take 8 capsules by mouth 3 (Three) Times a Day With Meals., Disp: 900 capsule, Rfl: 5    rosuvastatin (CRESTOR) 5 MG tablet, Take 1 tablet by mouth Daily., Disp: , Rfl:   No current facility-administered medications for this visit.    Facility-Administered Medications Ordered in Other Visits:     dexAMETHasone (DECADRON) IVPB 12 mg, 12 mg, Intravenous, Once, Ni Jackson MD, Last Rate: 200 mL/hr at 25 1002, 12 mg at 25 1002    gemcitabine (GEMZAR) 1,150 mg in sodium chloride 0.9 % 280.2 mL chemo IVPB, 750 mg/m2 (Treatment Plan Recorded), Intravenous, Once, Ni Jackson MD    heparin injection 500 Units, 500 Units, Intravenous, PRN, Ni Jackson MD    PACLitaxel-protein bound (ABRAXANE) chemo infusion 135 mg, 90 mg/m2 (Treatment Plan Recorded), Intravenous, Once, Ni Jackson MD    sodium chloride 0.9 % flush 10 mL, 10 mL, Intravenous, PRN, Ni Jackson MD    Pain Medications              gabapentin (NEURONTIN) 300 MG capsule Take 1 capsule by mouth 3 (Three) Times a Day.    oxyCODONE (ROXICODONE) 10 MG tablet () Take 1 tablet by mouth Every 6 (Six) Hours As Needed for Moderate Pain or Severe Pain for up to 30 days.               ALLERGIES:  No Known Allergies      Physical Exam    VITAL SIGNS:  /63 Comment: JAIROE  Pulse 84   Temp 97.5 °F (36.4 °C)  "(Temporal)   Resp 20   Ht 157.5 cm (62\")   Wt 45.8 kg (101 lb)   SpO2 98% Comment: RA  BMI 18.47 kg/m²     ECOG score: 1           Wt Readings from Last 3 Encounters:   05/22/25 45.8 kg (101 lb)   05/20/25 46.3 kg (102 lb)   05/08/25 46.3 kg (102 lb)       Body mass index is 18.47 kg/m². Body surface area is 1.43 meters squared.       Performance Status: 1    General: well appearing, in no acute distress  HEENT: sclera anicteric, neck is supple  Lymphatics: no cervical, supraclavicular, or axillary adenopathy  Cardiovascular: regular rate and rhythm, no murmurs, rubs or gallops  Lungs: clear to auscultation bilaterally  Abdomen: soft, nontender, nondistended.  No palpable organomegaly  Extremities: no lower extremity edema  Skin: no rashes, lesions, bruising, or petechiae  Msk:  Shows no weakness of the large muscle groups  Psych: Mood is stable        RECENT LABS:    Lab Results   Component Value Date    HGB 10.1 (L) 05/20/2025    HCT 31.4 (L) 05/20/2025    MCV 94.6 05/20/2025     05/20/2025    WBC 5.28 05/20/2025    NEUTROABS 2.95 05/20/2025    LYMPHSABS 1.37 05/20/2025    MONOSABS 0.73 05/20/2025    EOSABS 0.19 05/20/2025    BASOSABS 0.03 05/20/2025       Lab Results   Component Value Date    GLUCOSE 105 (H) 05/20/2025    BUN 9 05/20/2025    CREATININE 0.46 (L) 05/20/2025     (L) 05/20/2025    K 4.1 05/20/2025     05/20/2025    CO2 25.0 05/20/2025    CALCIUM 8.6 05/20/2025    PROTEINTOT 6.2 05/20/2025    ALBUMIN 3.7 05/20/2025    BILITOT 0.3 05/20/2025    ALKPHOS 144 (H) 05/20/2025    AST 19 05/20/2025    ALT 12 05/20/2025     Lab Results   Component Value Date     625.0 (H) 05/06/2025     1,200.0 (H) 04/08/2025     931.0 (H) 03/25/2025     1,956.0 (H) 02/25/2025     2,814.0 (H) 01/28/2025     CT Abdomen Pelvis With & Without Contrast    Result Date: 2/26/2025  Impression: 1.No significant change in the pancreatic head neoplasm as above 2.Stable mild intra and " extrahepatic biliary ductal dilatation to the level of the pancreatic head mass. 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 4.Slight interval decrease in size of omental nodules. 5.No significant change in multiple hypodense liver lesions compatible with metastatic disease Electronically Signed: Harvey Alvarez MD  2/26/2025 9:50 AM EST  Workstation ID: [id number]    CT Abdomen Pelvis With & Without Contrast    Result Date: 12/30/2024  Impression: Primary pancreatic head neoplasm is grossly similar in size. Grossly similar innumerable hepatic metastases, omental nodularity, and mesenteric lymphadenopathy. Slight interval recanalization of the previously occluded portosplenomesenteric venous confluence. New small volume free fluid. Electronically Signed: Merrill Duncan MD  12/30/2024 6:17 PM EST  Workstation ID: [id number]        Assessment/Plan    1.  Stage IV adenocarcinoma of the pancreas.   Continue Abraxane and gemcitabine.  I am going to further dose reduce her Abraxane and gemcitabine for the severe fatigue.  If she does have progressive disease I will likely offer her modified FOLFIRINOX.   For now she is responding to treatment.  Likely rescan her in July.     2.  Heartburn secondary to chemotherapy.  on PPI and baking soda with water.      3.  Constipation.  Improve constipation with MiraLAX twice a day along with stool softener.    4.  Chemotherapy-induced neuropathy.  Mostly numbness.  No significant pain for now.      Total time of patient care on day of service including time prior to, face to face with patient, and following visit spent in reviewing records, lab results, imaging studies, discussion with patient, and documentation/charting was >32 minutes.     Ni Jackson MD  [facility] Hematology and Oncology    Return on: 06/19/25  Return in (Approximately): 1 month, Schedule  with next infusion    Orders Placed This Encounter   Procedures    Cancer Antigen 19-9    Comprehensive metabolic panel    Comprehensive metabolic panel    CBC and Differential    CBC and Differential       5/22/2025     Future Appointments         Provider Department Center    6/3/2025 11:00 AM ACCESS AND LAB DRAW CHAIR 1 [facility] OUTPATIENT ONCOLOGY TC    6/5/2025 10:30 AM CHAIR 17 [facility] OUTPATIENT ONCOLOGY TC    6/9/2025 1:00 PM (Arrive by 12:45 PM) Michelle Arroyo PA-C [facility] PALLIATIVE CARE TC    6/17/2025 11:15 AM ACCESS AND LAB DRAW CHAIR 1 [facility] OUTPATIENT ONCOLOGY TC    6/19/2025 9:45 AM (Arrive by 9:30 AM) Ni Jackson MD [facility] HEMATOLOGY & ONCOLOGY TC    6/19/2025 10:30 AM CHAIR 12 [facility] OUTPATIENT ONCOLOGY CT

## 2025-05-30 ENCOUNTER — TELEPHONE (OUTPATIENT)
Dept: PALLIATIVE CARE | Facility: CLINIC | Age: 75
End: 2025-05-30
Payer: MEDICARE

## 2025-05-30 DIAGNOSIS — G89.3 CANCER RELATED PAIN: Primary | ICD-10-CM

## 2025-05-30 RX ORDER — OXYCODONE HYDROCHLORIDE 10 MG/1
10 TABLET ORAL EVERY 6 HOURS PRN
Qty: 120 TABLET | Refills: 0 | Status: SHIPPED | OUTPATIENT
Start: 2025-05-30 | End: 2025-06-29

## 2025-05-30 NOTE — TELEPHONE ENCOUNTER
PATIENT CALLED AND NEEDED OXYCODONE 10 MG REFILL SENT TO [facility]. PLEASE ADVISE.

## 2025-05-30 NOTE — TELEPHONE ENCOUNTER
LETICIA #: [id number]     Medication requested: Oxycodone 10 MG     Last fill date: 3/15/25     Last appointment: 3/6/25     Next appointment: 6/9/25

## 2025-06-03 ENCOUNTER — HOSPITAL ENCOUNTER (OUTPATIENT)
Dept: ONCOLOGY | Facility: HOSPITAL | Age: 75
Discharge: HOME | End: 2025-06-03
Admitting: INTERNAL MEDICINE
Payer: MEDICARE

## 2025-06-03 VITALS
SYSTOLIC BLOOD PRESSURE: 117 MMHG | HEIGHT: 62 IN | RESPIRATION RATE: 16 BRPM | DIASTOLIC BLOOD PRESSURE: 56 MMHG | WEIGHT: 105 LBS | TEMPERATURE: 98 F | BODY MASS INDEX: 19.32 KG/M2 | HEART RATE: 72 BPM

## 2025-06-03 DIAGNOSIS — C25.0 MALIGNANT TUMOR OF HEAD OF PANCREAS: ICD-10-CM

## 2025-06-03 DIAGNOSIS — Z45.2 ENCOUNTER FOR CARE RELATED TO PORT-A-CATH: Primary | ICD-10-CM

## 2025-06-03 LAB
ALBUMIN SERPL-MCNC: 3.5 G/DL (ref 3.5–5.2)
ALBUMIN/GLOB SERPL: 1.5 G/DL
ALP SERPL-CCNC: 94 U/L (ref 39–117)
ALT SERPL W P-5'-P-CCNC: 12 U/L (ref 1–33)
ANION GAP SERPL CALCULATED.3IONS-SCNC: 10 MMOL/L (ref 5–15)
AST SERPL-CCNC: 17 U/L (ref 1–32)
BASOPHILS # BLD AUTO: 0.05 10*3/MM3 (ref 0–0.2)
BASOPHILS NFR BLD AUTO: 0.5 % (ref 0–1.5)
BILIRUB SERPL-MCNC: 0.2 MG/DL (ref 0–1.2)
BUN SERPL-MCNC: 14.3 MG/DL (ref 8–23)
BUN/CREAT SERPL: 24.2 (ref 7–25)
CALCIUM SPEC-SCNC: 8.2 MG/DL (ref 8.6–10.5)
CANCER AG19-9 SERPL-ACNC: 649 U/ML
CHLORIDE SERPL-SCNC: 103 MMOL/L (ref 98–107)
CO2 SERPL-SCNC: 21 MMOL/L (ref 22–29)
CREAT SERPL-MCNC: 0.59 MG/DL (ref 0.57–1)
DEPRECATED RDW RBC AUTO: 64.1 FL (ref 37–54)
EGFRCR SERPLBLD CKD-EPI 2021: 93.5 ML/MIN/1.73
EOSINOPHIL # BLD AUTO: 0.25 10*3/MM3 (ref 0–0.4)
EOSINOPHIL NFR BLD AUTO: 2.4 % (ref 0.3–6.2)
ERYTHROCYTE [DISTWIDTH] IN BLOOD BY AUTOMATED COUNT: 18.5 % (ref 12.3–15.4)
GLOBULIN UR ELPH-MCNC: 2.3 GM/DL
GLUCOSE SERPL-MCNC: 92 MG/DL (ref 65–99)
HCT VFR BLD AUTO: 31.3 % (ref 34–46.6)
HGB BLD-MCNC: 10 G/DL (ref 12–15.9)
IMM GRANULOCYTES # BLD AUTO: 0.07 10*3/MM3 (ref 0–0.05)
IMM GRANULOCYTES NFR BLD AUTO: 0.7 % (ref 0–0.5)
LYMPHOCYTES # BLD AUTO: 1.95 10*3/MM3 (ref 0.7–3.1)
LYMPHOCYTES NFR BLD AUTO: 19 % (ref 19.6–45.3)
MCH RBC QN AUTO: 30.7 PG (ref 26.6–33)
MCHC RBC AUTO-ENTMCNC: 31.9 G/DL (ref 31.5–35.7)
MCV RBC AUTO: 96 FL (ref 79–97)
MONOCYTES # BLD AUTO: 1.21 10*3/MM3 (ref 0.1–0.9)
MONOCYTES NFR BLD AUTO: 11.8 % (ref 5–12)
NEUTROPHILS NFR BLD AUTO: 6.71 10*3/MM3 (ref 1.7–7)
NEUTROPHILS NFR BLD AUTO: 65.6 % (ref 42.7–76)
PLATELET # BLD AUTO: 227 10*3/MM3 (ref 140–450)
PMV BLD AUTO: 9.4 FL (ref 6–12)
POTASSIUM SERPL-SCNC: 4.5 MMOL/L (ref 3.5–5.2)
PROT SERPL-MCNC: 5.8 G/DL (ref 6–8.5)
RBC # BLD AUTO: 3.26 10*6/MM3 (ref 3.77–5.28)
SODIUM SERPL-SCNC: 134 MMOL/L (ref 136–145)
WBC NRBC COR # BLD AUTO: 10.24 10*3/MM3 (ref 3.4–10.8)

## 2025-06-03 PROCEDURE — 36591 DRAW BLOOD OFF VENOUS DEVICE: CPT

## 2025-06-03 PROCEDURE — 80053 COMPREHEN METABOLIC PANEL: CPT | Performed by: INTERNAL MEDICINE

## 2025-06-03 PROCEDURE — 85025 COMPLETE CBC W/AUTO DIFF WBC: CPT | Performed by: INTERNAL MEDICINE

## 2025-06-03 PROCEDURE — 86301 IMMUNOASSAY TUMOR CA 19-9: CPT | Performed by: INTERNAL MEDICINE

## 2025-06-03 PROCEDURE — 25010000002 HEPARIN LOCK FLUSH PER 10 UNITS: Performed by: INTERNAL MEDICINE

## 2025-06-03 RX ORDER — SODIUM CHLORIDE 0.9 % (FLUSH) 0.9 %
10 SYRINGE (ML) INJECTION AS NEEDED
Status: DISCONTINUED | OUTPATIENT
Start: 2025-06-03 | End: 2025-06-04 | Stop reason: HOSPADM

## 2025-06-03 RX ORDER — SODIUM CHLORIDE 0.9 % (FLUSH) 0.9 %
10 SYRINGE (ML) INJECTION AS NEEDED
Status: CANCELLED | OUTPATIENT
Start: 2025-06-03

## 2025-06-03 RX ORDER — HEPARIN SODIUM (PORCINE) LOCK FLUSH IV SOLN 100 UNIT/ML 100 UNIT/ML
500 SOLUTION INTRAVENOUS AS NEEDED
Status: DISCONTINUED | OUTPATIENT
Start: 2025-06-03 | End: 2025-06-04 | Stop reason: HOSPADM

## 2025-06-03 RX ORDER — HEPARIN SODIUM (PORCINE) LOCK FLUSH IV SOLN 100 UNIT/ML 100 UNIT/ML
500 SOLUTION INTRAVENOUS AS NEEDED
Status: CANCELLED | OUTPATIENT
Start: 2025-06-03

## 2025-06-03 RX ORDER — SODIUM CHLORIDE 0.9 % (FLUSH) 0.9 %
20 SYRINGE (ML) INJECTION AS NEEDED
Status: CANCELLED | OUTPATIENT
Start: 2025-06-03

## 2025-06-03 RX ADMIN — Medication 10 ML: at 11:04

## 2025-06-03 RX ADMIN — HEPARIN 500 UNITS: 100 SYRINGE at 11:04

## 2025-06-05 ENCOUNTER — HOSPITAL ENCOUNTER (OUTPATIENT)
Dept: ONCOLOGY | Facility: HOSPITAL | Age: 75
Discharge: HOME | End: 2025-06-05
Admitting: INTERNAL MEDICINE
Payer: MEDICARE

## 2025-06-05 VITALS
HEART RATE: 75 BPM | WEIGHT: 104 LBS | SYSTOLIC BLOOD PRESSURE: 110 MMHG | BODY MASS INDEX: 19.14 KG/M2 | TEMPERATURE: 97.3 F | HEIGHT: 62 IN | DIASTOLIC BLOOD PRESSURE: 53 MMHG | RESPIRATION RATE: 16 BRPM

## 2025-06-05 DIAGNOSIS — C25.0 MALIGNANT TUMOR OF HEAD OF PANCREAS: Primary | ICD-10-CM

## 2025-06-05 DIAGNOSIS — Z45.2 ENCOUNTER FOR CARE RELATED TO PORT-A-CATH: ICD-10-CM

## 2025-06-05 PROCEDURE — 96413 CHEMO IV INFUSION 1 HR: CPT

## 2025-06-05 PROCEDURE — 96375 TX/PRO/DX INJ NEW DRUG ADDON: CPT

## 2025-06-05 PROCEDURE — 25010000002 GEMCITABINE 200 MG/5.26ML SOLUTION 5.26 ML VIAL: Performed by: INTERNAL MEDICINE

## 2025-06-05 PROCEDURE — 25010000002 GEMCITABINE 1 GM/26.3ML SOLUTION 26.3 ML VIAL: Performed by: INTERNAL MEDICINE

## 2025-06-05 PROCEDURE — 25010000002 DEXAMETHASONE SODIUM PHOSPHATE 100 MG/10ML SOLUTION: Performed by: INTERNAL MEDICINE

## 2025-06-05 PROCEDURE — 25810000003 SODIUM CHLORIDE 0.9 % SOLUTION: Performed by: INTERNAL MEDICINE

## 2025-06-05 PROCEDURE — 25810000003 SODIUM CHLORIDE 0.9 % SOLUTION 250 ML FLEX CONT: Performed by: INTERNAL MEDICINE

## 2025-06-05 PROCEDURE — 96417 CHEMO IV INFUS EACH ADDL SEQ: CPT

## 2025-06-05 PROCEDURE — 25010000002 HEPARIN LOCK FLUSH PER 10 UNITS: Performed by: INTERNAL MEDICINE

## 2025-06-05 PROCEDURE — 25010000002 PACLITAXEL PROTEIN-BOUND PART PER 1 MG: Performed by: INTERNAL MEDICINE

## 2025-06-05 RX ORDER — PACLITAXEL 100 MG/20ML
90 INJECTION, POWDER, LYOPHILIZED, FOR SUSPENSION INTRAVENOUS ONCE
Status: COMPLETED | OUTPATIENT
Start: 2025-06-05 | End: 2025-06-05

## 2025-06-05 RX ORDER — SODIUM CHLORIDE 0.9 % (FLUSH) 0.9 %
20 SYRINGE (ML) INJECTION AS NEEDED
OUTPATIENT
Start: 2025-06-05

## 2025-06-05 RX ORDER — HEPARIN SODIUM (PORCINE) LOCK FLUSH IV SOLN 100 UNIT/ML 100 UNIT/ML
500 SOLUTION INTRAVENOUS AS NEEDED
Status: DISCONTINUED | OUTPATIENT
Start: 2025-06-05 | End: 2025-06-06 | Stop reason: HOSPADM

## 2025-06-05 RX ORDER — HEPARIN SODIUM (PORCINE) LOCK FLUSH IV SOLN 100 UNIT/ML 100 UNIT/ML
500 SOLUTION INTRAVENOUS AS NEEDED
OUTPATIENT
Start: 2025-06-05

## 2025-06-05 RX ORDER — SODIUM CHLORIDE 0.9 % (FLUSH) 0.9 %
10 SYRINGE (ML) INJECTION AS NEEDED
OUTPATIENT
Start: 2025-06-05

## 2025-06-05 RX ORDER — SODIUM CHLORIDE 9 MG/ML
20 INJECTION, SOLUTION INTRAVENOUS ONCE
Status: COMPLETED | OUTPATIENT
Start: 2025-06-05 | End: 2025-06-05

## 2025-06-05 RX ADMIN — SODIUM CHLORIDE 1150 MG: 9 INJECTION, SOLUTION INTRAVENOUS at 12:37

## 2025-06-05 RX ADMIN — PACLITAXEL 135 MG: 100 INJECTION, POWDER, LYOPHILIZED, FOR SUSPENSION INTRAVENOUS at 11:53

## 2025-06-05 RX ADMIN — HEPARIN 500 UNITS: 100 SYRINGE at 13:21

## 2025-06-05 RX ADMIN — SODIUM CHLORIDE 20 ML/HR: 9 INJECTION, SOLUTION INTRAVENOUS at 10:57

## 2025-06-05 RX ADMIN — DEXAMETHASONE SODIUM PHOSPHATE 12 MG: 10 INJECTION, SOLUTION INTRAMUSCULAR; INTRAVENOUS at 11:00

## 2025-06-09 ENCOUNTER — OFFICE VISIT (OUTPATIENT)
Dept: PALLIATIVE CARE | Facility: CLINIC | Age: 75
End: 2025-06-09
Payer: MEDICARE

## 2025-06-09 VITALS
HEART RATE: 73 BPM | RESPIRATION RATE: 18 BRPM | DIASTOLIC BLOOD PRESSURE: 66 MMHG | OXYGEN SATURATION: 100 % | SYSTOLIC BLOOD PRESSURE: 109 MMHG | WEIGHT: 102.4 LBS | BODY MASS INDEX: 18.73 KG/M2 | TEMPERATURE: 97.7 F

## 2025-06-09 DIAGNOSIS — C25.0 MALIGNANT NEOPLASM OF HEAD OF PANCREAS: Primary | ICD-10-CM

## 2025-06-09 DIAGNOSIS — G89.3 CANCER RELATED PAIN: ICD-10-CM

## 2025-06-09 DIAGNOSIS — R53.0 NEOPLASTIC MALIGNANT RELATED FATIGUE: ICD-10-CM

## 2025-06-09 RX ORDER — OXYCODONE HYDROCHLORIDE 10 MG/1
10 TABLET ORAL EVERY 6 HOURS PRN
Qty: 120 TABLET | Refills: 0 | Status: SHIPPED | OUTPATIENT
Start: 2025-06-29 | End: 2025-07-29

## 2025-06-09 NOTE — PROGRESS NOTES
Palliative Clinic Note      Name: Claudia Flores  Age: 74 y.o.  Sex: female  : 1950  MRN: [id number]  Date of Service: 2025   Medical Oncologist: Dr. Jackson     Subjective:    Chief Complaint: Abdominal pain, fatigue    History of Present Illness: Claudia Flores is a 74 y.o. female with past medical history significant for pancreatic cancer, hypertension, hypothyroidism, hyperlipidemia who presents to the palliative clinic today as a follow up for pain and symptom management.     Treatment summary: The patient presented with unintentional weight loss. Imaging revealed a pancreatic head mass along with findings suspicious for metastatic disease in the lung and liver. Patient started chemotherapy with gemcitabine with Abraxane in 10/2024. Dose reductions for fatigue. Next scans are due in 2025.     Pain: The patient c/o abdominal pain controlled with oxycodone 10 mg q6h PRN. The pain is worse in the AM. She denies significant side effects from the opioid therapy but admits to some constipation. Failed trial of gabapentin due to poor efficacy.      Other symptoms: The patient complains of fatigue, slightly better with chemo adjustments. She has noticed her blood pressure has been low. She is prescribed an antihypertensive drug per PCP. She plans to keep a BP journal and reach out to her PCP. She also complains of vision changes secondary to cataracts. The patient denies nausea or vomiting.  Patient is prescribed Megace for appetite stimulation.  She manages opioid induced constipation with stool softener as needed.      Psychosocial: The patient lives with her , Malick. They have 1 daughter and 2 grand children that live nearby. The patient is a retired teacher. She enjoys traveling. The is a member at [employer]. No allergies. She admits to smoking 1/2 PPD. She reports rare alcohol use and no illicit drug use. No history of mental illness. Patient denies anxiety or depression.       Spiritual: Confucianism.      Goals: Maximize comfort, optimize function & psychosocial wellbeing, and promote advanced care planning.    The following portions of the patient's history were reviewed and updated as appropriate: allergies, current medications, past family history, past medical history, past social history, past surgical history and problem list.    ORT-R: Low risk   Decisional capacity: Full  ECOG: (1) Restricted in physically strenuous activity, ambulatory and able to do work of light nature     Objective:    /66   Pulse 73   Temp 97.7 °F (36.5 °C) (Temporal)   Resp 18   Wt 46.4 kg (102 lb 6.4 oz)   SpO2 100%   BMI 18.73 kg/m²     Constitutional: Awake, alert, using cane, sitting up in exam chair, in no acute distress  Eyes: PERRLA, EOMS intact  HENT: NCAT, face symmetric  Neck: Supple, trachea midline  Respiratory: Nonlabored respirations  Cardiovascular: RRR, no edema observed  Gastrointestinal: Soft, no guarding  Musculoskeletal: Moves all extremities   Psychiatric: Appropriate affect, cooperative  Neurologic: Oriented x 3, Cranial Nerves grossly intact to confrontation, speech clear  Skin: Cool dry, no rashes or wounds appreciated     Medication Counts: Reviewed. See bottom of note for details. Brought medication.  No overuse or misuse evident.  I have reviewed the patient's [place] PDMP. LETICIA Req #[id number] .   UDS: 3/6/25.     Assessment & Plan:    1. Malignant neoplasm of head of pancreas  - Patient on chemotherapy with gemcitabine with Abraxane. Next scans are due in 7/2025.    2. Cancer related pain  - Patient is appropriate for opioid therapy due to cancer related pain. Daily function and quality of life improved with pain medication. Refill for oxycodone 10 mg q6h PRN was sent to the pharmacy. Side effects of the medication discussed at every visit. Patient was encouraged to continue bowel regimen of daily stool softeners, prn laxatives, and diet modifications.    3. Neoplastic  malignant related fatigue  - Slightly better with chemo dose reduction. Patient plans to keep a BP journal and reach out to her PCP regarding antihypertensive therapy.  Continue energy conservation and behavior modification.    Advanced directives: Yes, Living Will     Return in about 3 months (around 9/9/2025) for Office Visit.    I spent 30 minutes caring for Claudia Flores on this date of service. This time includes time spent by me in the following activities: preparing for the visit, reviewing tests, obtaining and/or reviewing a separately obtained history, performing a medically appropriate examination and/or evaluation , counseling and educating the patient/family/caregiver, ordering medications, tests, or procedures, documenting information in the medical record, independently interpreting results and communicating that information with the patient/family/caregiver, and care coordination    Michelle Arroyo PA-C  06/09/2025    Medication Date Filled # Filled Count Used # Days  ISAC   Oxycodone 10 5/30/25 120 103 17 10 2   Gabapentin 300 3/6/25 90 -- -- -- --

## 2025-06-17 ENCOUNTER — HOSPITAL ENCOUNTER (OUTPATIENT)
Dept: ONCOLOGY | Facility: HOSPITAL | Age: 75
Discharge: HOME | End: 2025-06-17
Admitting: INTERNAL MEDICINE
Payer: MEDICARE

## 2025-06-17 VITALS
RESPIRATION RATE: 16 BRPM | TEMPERATURE: 97.1 F | HEART RATE: 87 BPM | DIASTOLIC BLOOD PRESSURE: 58 MMHG | WEIGHT: 103 LBS | BODY MASS INDEX: 18.95 KG/M2 | SYSTOLIC BLOOD PRESSURE: 123 MMHG | HEIGHT: 62 IN

## 2025-06-17 DIAGNOSIS — Z45.2 ENCOUNTER FOR CARE RELATED TO PORT-A-CATH: Primary | ICD-10-CM

## 2025-06-17 DIAGNOSIS — C25.0 MALIGNANT TUMOR OF HEAD OF PANCREAS: ICD-10-CM

## 2025-06-17 LAB
ALBUMIN SERPL-MCNC: 3.6 G/DL (ref 3.5–5.2)
ALBUMIN/GLOB SERPL: 1.2 G/DL
ALP SERPL-CCNC: 120 U/L (ref 39–117)
ALT SERPL W P-5'-P-CCNC: 12 U/L (ref 1–33)
ANION GAP SERPL CALCULATED.3IONS-SCNC: 7 MMOL/L (ref 5–15)
AST SERPL-CCNC: 15 U/L (ref 1–32)
BASOPHILS # BLD AUTO: 0.04 10*3/MM3 (ref 0–0.2)
BASOPHILS NFR BLD AUTO: 0.6 % (ref 0–1.5)
BILIRUB SERPL-MCNC: 0.4 MG/DL (ref 0–1.2)
BUN SERPL-MCNC: 12.8 MG/DL (ref 8–23)
BUN/CREAT SERPL: 22.1 (ref 7–25)
CALCIUM SPEC-SCNC: 8.7 MG/DL (ref 8.6–10.5)
CHLORIDE SERPL-SCNC: 104 MMOL/L (ref 98–107)
CO2 SERPL-SCNC: 21 MMOL/L (ref 22–29)
CREAT SERPL-MCNC: 0.58 MG/DL (ref 0.57–1)
DEPRECATED RDW RBC AUTO: 66.1 FL (ref 37–54)
EGFRCR SERPLBLD CKD-EPI 2021: 93.9 ML/MIN/1.73
EOSINOPHIL # BLD AUTO: 0.27 10*3/MM3 (ref 0–0.4)
EOSINOPHIL NFR BLD AUTO: 4.1 % (ref 0.3–6.2)
ERYTHROCYTE [DISTWIDTH] IN BLOOD BY AUTOMATED COUNT: 18.3 % (ref 12.3–15.4)
GLOBULIN UR ELPH-MCNC: 3.1 GM/DL
GLUCOSE SERPL-MCNC: 124 MG/DL (ref 65–99)
HCT VFR BLD AUTO: 31.8 % (ref 34–46.6)
HGB BLD-MCNC: 10 G/DL (ref 12–15.9)
IMM GRANULOCYTES # BLD AUTO: 0.03 10*3/MM3 (ref 0–0.05)
IMM GRANULOCYTES NFR BLD AUTO: 0.5 % (ref 0–0.5)
LYMPHOCYTES # BLD AUTO: 1.45 10*3/MM3 (ref 0.7–3.1)
LYMPHOCYTES NFR BLD AUTO: 22.2 % (ref 19.6–45.3)
MCH RBC QN AUTO: 30.4 PG (ref 26.6–33)
MCHC RBC AUTO-ENTMCNC: 31.4 G/DL (ref 31.5–35.7)
MCV RBC AUTO: 96.7 FL (ref 79–97)
MONOCYTES # BLD AUTO: 0.95 10*3/MM3 (ref 0.1–0.9)
MONOCYTES NFR BLD AUTO: 14.5 % (ref 5–12)
NEUTROPHILS NFR BLD AUTO: 3.8 10*3/MM3 (ref 1.7–7)
NEUTROPHILS NFR BLD AUTO: 58.1 % (ref 42.7–76)
PLATELET # BLD AUTO: 159 10*3/MM3 (ref 140–450)
PMV BLD AUTO: 9.6 FL (ref 6–12)
POTASSIUM SERPL-SCNC: 4.2 MMOL/L (ref 3.5–5.2)
PROT SERPL-MCNC: 6.7 G/DL (ref 6–8.5)
RBC # BLD AUTO: 3.29 10*6/MM3 (ref 3.77–5.28)
SODIUM SERPL-SCNC: 132 MMOL/L (ref 136–145)
WBC NRBC COR # BLD AUTO: 6.54 10*3/MM3 (ref 3.4–10.8)

## 2025-06-17 PROCEDURE — 36591 DRAW BLOOD OFF VENOUS DEVICE: CPT

## 2025-06-17 PROCEDURE — 80053 COMPREHEN METABOLIC PANEL: CPT | Performed by: INTERNAL MEDICINE

## 2025-06-17 PROCEDURE — 85025 COMPLETE CBC W/AUTO DIFF WBC: CPT | Performed by: INTERNAL MEDICINE

## 2025-06-17 PROCEDURE — 25010000002 HEPARIN LOCK FLUSH PER 10 UNITS: Performed by: INTERNAL MEDICINE

## 2025-06-17 RX ORDER — SODIUM CHLORIDE 0.9 % (FLUSH) 0.9 %
20 SYRINGE (ML) INJECTION AS NEEDED
Status: CANCELLED | OUTPATIENT
Start: 2025-06-17

## 2025-06-17 RX ORDER — HEPARIN SODIUM (PORCINE) LOCK FLUSH IV SOLN 100 UNIT/ML 100 UNIT/ML
500 SOLUTION INTRAVENOUS AS NEEDED
Status: CANCELLED | OUTPATIENT
Start: 2025-06-17

## 2025-06-17 RX ORDER — SODIUM CHLORIDE 0.9 % (FLUSH) 0.9 %
10 SYRINGE (ML) INJECTION AS NEEDED
Status: CANCELLED | OUTPATIENT
Start: 2025-06-17

## 2025-06-17 RX ORDER — HEPARIN SODIUM (PORCINE) LOCK FLUSH IV SOLN 100 UNIT/ML 100 UNIT/ML
500 SOLUTION INTRAVENOUS AS NEEDED
Status: DISCONTINUED | OUTPATIENT
Start: 2025-06-17 | End: 2025-06-18 | Stop reason: HOSPADM

## 2025-06-17 RX ADMIN — HEPARIN 500 UNITS: 100 SYRINGE at 11:20

## 2025-06-19 ENCOUNTER — HOSPITAL ENCOUNTER (OUTPATIENT)
Dept: ONCOLOGY | Facility: HOSPITAL | Age: 75
Discharge: HOME | End: 2025-06-19
Admitting: INTERNAL MEDICINE
Payer: MEDICARE

## 2025-06-19 ENCOUNTER — OFFICE VISIT (OUTPATIENT)
Dept: ONCOLOGY | Facility: CLINIC | Age: 75
End: 2025-06-19
Payer: MEDICARE

## 2025-06-19 VITALS
RESPIRATION RATE: 20 BRPM | HEIGHT: 62 IN | OXYGEN SATURATION: 98 % | DIASTOLIC BLOOD PRESSURE: 64 MMHG | WEIGHT: 103 LBS | BODY MASS INDEX: 18.95 KG/M2 | HEART RATE: 90 BPM | TEMPERATURE: 97 F | SYSTOLIC BLOOD PRESSURE: 134 MMHG

## 2025-06-19 DIAGNOSIS — Z45.2 ENCOUNTER FOR CARE RELATED TO PORT-A-CATH: ICD-10-CM

## 2025-06-19 DIAGNOSIS — C25.0 MALIGNANT TUMOR OF HEAD OF PANCREAS: Primary | ICD-10-CM

## 2025-06-19 PROCEDURE — 96413 CHEMO IV INFUSION 1 HR: CPT

## 2025-06-19 PROCEDURE — 25010000002 DEXAMETHASONE SODIUM PHOSPHATE 100 MG/10ML SOLUTION: Performed by: INTERNAL MEDICINE

## 2025-06-19 PROCEDURE — 96417 CHEMO IV INFUS EACH ADDL SEQ: CPT

## 2025-06-19 PROCEDURE — 25010000002 HEPARIN LOCK FLUSH PER 10 UNITS: Performed by: INTERNAL MEDICINE

## 2025-06-19 PROCEDURE — 25810000003 SODIUM CHLORIDE 0.9 % SOLUTION 250 ML FLEX CONT: Performed by: INTERNAL MEDICINE

## 2025-06-19 PROCEDURE — 25010000002 GEMCITABINE 1 GM/26.3ML SOLUTION 26.3 ML VIAL: Performed by: INTERNAL MEDICINE

## 2025-06-19 PROCEDURE — 25810000003 SODIUM CHLORIDE 0.9 % SOLUTION: Performed by: INTERNAL MEDICINE

## 2025-06-19 PROCEDURE — 25010000002 PACLITAXEL PROTEIN-BOUND PART PER 1 MG: Performed by: INTERNAL MEDICINE

## 2025-06-19 PROCEDURE — 25010000002 GEMCITABINE 200 MG/5.26ML SOLUTION 5.26 ML VIAL: Performed by: INTERNAL MEDICINE

## 2025-06-19 PROCEDURE — 96375 TX/PRO/DX INJ NEW DRUG ADDON: CPT

## 2025-06-19 RX ORDER — PACLITAXEL 100 MG/20ML
90 INJECTION, POWDER, LYOPHILIZED, FOR SUSPENSION INTRAVENOUS ONCE
OUTPATIENT
Start: 2025-08-14

## 2025-06-19 RX ORDER — HEPARIN SODIUM (PORCINE) LOCK FLUSH IV SOLN 100 UNIT/ML 100 UNIT/ML
500 SOLUTION INTRAVENOUS AS NEEDED
OUTPATIENT
Start: 2025-06-19

## 2025-06-19 RX ORDER — PACLITAXEL 100 MG/20ML
90 INJECTION, POWDER, LYOPHILIZED, FOR SUSPENSION INTRAVENOUS ONCE
Status: COMPLETED | OUTPATIENT
Start: 2025-06-19 | End: 2025-06-19

## 2025-06-19 RX ORDER — SODIUM CHLORIDE 9 MG/ML
20 INJECTION, SOLUTION INTRAVENOUS ONCE
OUTPATIENT
Start: 2025-07-31

## 2025-06-19 RX ORDER — SODIUM CHLORIDE 0.9 % (FLUSH) 0.9 %
20 SYRINGE (ML) INJECTION AS NEEDED
OUTPATIENT
Start: 2025-06-19

## 2025-06-19 RX ORDER — PACLITAXEL 100 MG/20ML
90 INJECTION, POWDER, LYOPHILIZED, FOR SUSPENSION INTRAVENOUS ONCE
OUTPATIENT
Start: 2025-07-17

## 2025-06-19 RX ORDER — SODIUM CHLORIDE 0.9 % (FLUSH) 0.9 %
10 SYRINGE (ML) INJECTION AS NEEDED
OUTPATIENT
Start: 2025-06-19

## 2025-06-19 RX ORDER — SODIUM CHLORIDE 9 MG/ML
20 INJECTION, SOLUTION INTRAVENOUS ONCE
OUTPATIENT
Start: 2025-07-17

## 2025-06-19 RX ORDER — HEPARIN SODIUM (PORCINE) LOCK FLUSH IV SOLN 100 UNIT/ML 100 UNIT/ML
500 SOLUTION INTRAVENOUS AS NEEDED
Status: DISCONTINUED | OUTPATIENT
Start: 2025-06-19 | End: 2025-06-20 | Stop reason: HOSPADM

## 2025-06-19 RX ORDER — SODIUM CHLORIDE 9 MG/ML
20 INJECTION, SOLUTION INTRAVENOUS ONCE
OUTPATIENT
Start: 2025-07-03

## 2025-06-19 RX ORDER — SODIUM CHLORIDE 9 MG/ML
20 INJECTION, SOLUTION INTRAVENOUS ONCE
OUTPATIENT
Start: 2025-08-14

## 2025-06-19 RX ORDER — PACLITAXEL 100 MG/20ML
90 INJECTION, POWDER, LYOPHILIZED, FOR SUSPENSION INTRAVENOUS ONCE
OUTPATIENT
Start: 2025-07-31

## 2025-06-19 RX ORDER — PACLITAXEL 100 MG/20ML
90 INJECTION, POWDER, LYOPHILIZED, FOR SUSPENSION INTRAVENOUS ONCE
OUTPATIENT
Start: 2025-07-03

## 2025-06-19 RX ORDER — ERYTHROMYCIN 5 MG/G
OINTMENT OPHTHALMIC
COMMUNITY
Start: 2025-06-12

## 2025-06-19 RX ORDER — SODIUM CHLORIDE 9 MG/ML
20 INJECTION, SOLUTION INTRAVENOUS ONCE
Status: COMPLETED | OUTPATIENT
Start: 2025-06-19 | End: 2025-06-19

## 2025-06-19 RX ADMIN — HEPARIN 500 UNITS: 100 SYRINGE at 13:29

## 2025-06-19 RX ADMIN — GEMCITABINE 1150 MG: 38 INJECTION, SOLUTION INTRAVENOUS at 12:37

## 2025-06-19 RX ADMIN — PACLITAXEL 135 MG: 100 INJECTION, POWDER, LYOPHILIZED, FOR SUSPENSION INTRAVENOUS at 11:34

## 2025-06-19 RX ADMIN — DEXAMETHASONE SODIUM PHOSPHATE 12 MG: 10 INJECTION, SOLUTION INTRAMUSCULAR; INTRAVENOUS at 10:46

## 2025-06-19 RX ADMIN — SODIUM CHLORIDE 20 ML/HR: 9 INJECTION, SOLUTION INTRAVENOUS at 11:30

## 2025-06-19 NOTE — LETTER
June 19, 2025     Jeannie Salgado MD  [address] [facility] [address]  [facility] [address]    Patient: Claudia Flores   YOB: 1950   Date of Visit: 6/19/2025     Dear Jeannie Salgado MD:       Thank you for referring Claudia Flores to me for evaluation. Below are the relevant portions of my assessment and plan of care.    If you have questions, please do not hesitate to call me. I look forward to following Claudia along with you.         Sincerely,        Ni Jackson MD        CC: No Recipients    Ni Jackson MD  06/19/25 1001  Sign when Signing Visit  PROBLEM LIST:  Oncology/Hematology History   Malignant tumor of head of pancreas   10/2/2024 Cancer Staged    Staging form: Exocrine Pancreas, AJCC 8th Edition  - Clinical stage from 10/2/2024: Stage IV (cT2, cN0, cM1) - Signed by Ni Jackson MD on 10/4/2024     10/3/2024 Initial Diagnosis    Malignant tumor of head of pancreas     10/10/2024 -  Chemotherapy    OP PANCREATIC PACLitaxel Protein-Bound / Gemcitabine         REASON FOR VISIT: Pancreatic cancer    HISTORY OF PRESENT ILLNESS:   74 y.o.  female presents today for follow-up of her pancreatic cancer.  She is undergoing chemotherapy with Abraxane and gemcitabine.  Doing well clinically.  Still having considerable fatigue.  Overall no infections.  Pain seems relatively controlled.    Past medical history, social history and family history was reviewed 06/19/25 and unchanged from prior visit.    Review of Systems:    Review of Systems   Constitutional:  Positive for fatigue.   HENT:  Negative.     Eyes: Negative.    Respiratory: Negative.     Cardiovascular: Negative.    Gastrointestinal: Negative.    Endocrine: Negative.    Genitourinary: Negative.     Musculoskeletal: Negative.    Skin: Negative.    Neurological: Negative.    Hematological: Negative.    Psychiatric/Behavioral: Negative.              Medications:        Current Outpatient Medications:   •   "amLODIPine-benazepril (LOTREL 5-10) 5-10 MG per capsule, Take 1 capsule by mouth Daily., Disp: , Rfl:   •  erythromycin (ROMYCIN) 5 MG/GM ophthalmic ointment, , Disp: , Rfl:   •  levothyroxine (SYNTHROID, LEVOTHROID) 50 MCG tablet, Take 1 tablet by mouth., Disp: , Rfl:   •  lidocaine-prilocaine (EMLA) 2.5-2.5 % cream, Apply 1 Application topically to the appropriate area as directed As Needed (45-60 minutes prior to port access.  Cover with saran/plastic wrap)., Disp: 30 g, Rfl: 2  •  megestrol (MEGACE) 40 MG/ML suspension, Take 20 mL by mouth Daily., Disp: 480 mL, Rfl: 1  •  naloxone (NARCAN) 4 MG/0.1ML nasal spray, Administer 1 spray into the nostril(s) as directed by provider As Needed for Opioid Reversal or Respiratory Depression., Disp: 1 each, Rfl: 0  •  ondansetron (ZOFRAN) 8 MG tablet, Take 1 tablet by mouth 3 (Three) Times a Day As Needed for Nausea or Vomiting., Disp: 30 tablet, Rfl: 5  •  ondansetron ODT (ZOFRAN-ODT) 8 MG disintegrating tablet, Place 1 tablet on the tongue Every 8 (Eight) Hours As Needed for Nausea or Vomiting., Disp: 30 tablet, Rfl: 5  •  [START ON 6/29/2025] oxyCODONE (ROXICODONE) 10 MG tablet, Take 1 tablet by mouth Every 6 (Six) Hours As Needed for Moderate Pain or Severe Pain for up to 30 days., Disp: 120 tablet, Rfl: 0  •  Pancrelipase, Lip-Prot-Amyl, (CREON) 0023-4111 units capsule delayed-release particles capsule, Take 8 capsules by mouth 3 (Three) Times a Day With Meals., Disp: 900 capsule, Rfl: 5  •  rosuvastatin (CRESTOR) 5 MG tablet, Take 1 tablet by mouth Daily., Disp: , Rfl:     Pain Medications              oxyCODONE (ROXICODONE) 10 MG tablet Starting on 6/29/2025. Take 1 tablet by mouth Every 6 (Six) Hours As Needed for Moderate Pain or Severe Pain for up to 30 days.               ALLERGIES:  No Known Allergies      Physical Exam    VITAL SIGNS:  /64 Comment: LUE  Pulse 90   Temp 97 °F (36.1 °C) (Temporal)   Resp 20   Ht 157.5 cm (62\")   Wt 46.7 kg (103 lb)   " SpO2 98% Comment: RA  BMI 18.84 kg/m²     ECOG score: 1           Wt Readings from Last 3 Encounters:   06/19/25 46.7 kg (103 lb)   06/17/25 46.7 kg (103 lb)   06/09/25 46.4 kg (102 lb 6.4 oz)       Body mass index is 18.84 kg/m². Body surface area is 1.44 meters squared.       Performance Status: 1    General: well appearing, in no acute distress  HEENT: sclera anicteric, neck is supple  Lymphatics: no cervical, supraclavicular, or axillary adenopathy  Cardiovascular: regular rate and rhythm, no murmurs, rubs or gallops  Lungs: clear to auscultation bilaterally  Abdomen: soft, nontender, nondistended.  No palpable organomegaly  Extremities: no lower extremity edema  Skin: no rashes, lesions, bruising, or petechiae  Msk:  Shows no weakness of the large muscle groups  Psych: Mood is stable        RECENT LABS:    Lab Results   Component Value Date    HGB 10.0 (L) 06/17/2025    HCT 31.8 (L) 06/17/2025    MCV 96.7 06/17/2025     06/17/2025    WBC 6.54 06/17/2025    NEUTROABS 3.80 06/17/2025    LYMPHSABS 1.45 06/17/2025    MONOSABS 0.95 (H) 06/17/2025    EOSABS 0.27 06/17/2025    BASOSABS 0.04 06/17/2025       Lab Results   Component Value Date    GLUCOSE 124 (H) 06/17/2025    BUN 12.8 06/17/2025    CREATININE 0.58 06/17/2025     (L) 06/17/2025    K 4.2 06/17/2025     06/17/2025    CO2 21.0 (L) 06/17/2025    CALCIUM 8.7 06/17/2025    PROTEINTOT 6.7 06/17/2025    ALBUMIN 3.6 06/17/2025    BILITOT 0.4 06/17/2025    ALKPHOS 120 (H) 06/17/2025    AST 15 06/17/2025    ALT 12 06/17/2025     Lab Results   Component Value Date     649.0 (H) 06/03/2025     625.0 (H) 05/06/2025     1,200.0 (H) 04/08/2025     931.0 (H) 03/25/2025     1,956.0 (H) 02/25/2025     CT Abdomen Pelvis With & Without Contrast    Result Date: 2/26/2025  Impression: 1.No significant change in the pancreatic head neoplasm as above 2.Stable mild intra and extrahepatic biliary ductal dilatation to the level of the  pancreatic head mass. 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 4.Slight interval decrease in size of omental nodules. 5.No significant change in multiple hypodense liver lesions compatible with metastatic disease Electronically Signed: Harvey Alvarez MD  2/26/2025 9:50 AM EST  Workstation ID: [id number]    CT Abdomen Pelvis With & Without Contrast    Result Date: 12/30/2024  Impression: Primary pancreatic head neoplasm is grossly similar in size. Grossly similar innumerable hepatic metastases, omental nodularity, and mesenteric lymphadenopathy. Slight interval recanalization of the previously occluded portosplenomesenteric venous confluence. New small volume free fluid. Electronically Signed: Merrill Duncan MD  12/30/2024 6:17 PM EST  Workstation ID: [id number]        Assessment/Plan    1.  Stage IV adenocarcinoma of the pancreas.   Continue Abraxane and gemcitabine.  I have dose reduce her Abraxane and gemcitabine for the severe fatigue.  For now she is doing well.  I am going to scan her in 1 month.  If she does have progressive disease I will likely offer her modified FOLFIRINOX.   For now she is responding to treatment.      2.  Heartburn secondary to chemotherapy.  on PPI and baking soda with water.      3.  Constipation.  Improve constipation with MiraLAX twice a day along with stool softener.    4.  Chemotherapy-induced neuropathy.  Mostly numbness.  No significant pain for now.      Total time of patient care on day of service including time prior to, face to face with patient, and following visit spent in reviewing records, lab results, imaging studies, discussion with patient, and documentation/charting was >33 minutes.     Ni Jackson MD  [facility] Hematology and Oncology    Return on: 07/17/25  Return in (Approximately): 1 month, Schedule with next infusion    Orders Placed  This Encounter   Procedures   • CT Abdomen Pelvis With & Without Contrast   • Cancer Antigen 19-9   • Comprehensive metabolic panel   • Comprehensive metabolic panel   • Cancer Antigen 19-9   • Comprehensive metabolic panel   • Comprehensive metabolic panel   • CBC and Differential   • CBC and Differential   • CBC and Differential   • CBC and Differential       6/19/2025     Future Appointments         Provider Department Center    6/19/2025 10:30 AM CHAIR 12 [facility] OUTPATIENT ONCOLOGY TC    7/1/2025 2:00 PM ACCESS AND LAB DRAW CHAIR 1 [facility] OUTPATIENT ONCOLOGY TC    7/15/2025 10:15 AM ACCESS AND LAB DRAW CHAIR 1 [facility] OUTPATIENT ONCOLOGY TC    7/15/2025 11:15 AM (Arrive by 11:00 AM) TC BRAN CT 1 [facility] CT AT [facility]    7/17/2025 9:00 AM (Arrive by 8:45 AM) Ni Jackson MD [facility] HEMATOLOGY & ONCOLOGY TC    7/17/2025 9:30 AM CHAIR 19 [facility] OUTPATIENT ONCOLOGY TC    9/9/2025 1:00 PM (Arrive by 12:45 PM) Michelle Arroyo PA-C [facility] PALLIATIVE CARE TC

## 2025-06-19 NOTE — PROGRESS NOTES
PROBLEM LIST:  Oncology/Hematology History   Malignant tumor of head of pancreas   10/2/2024 Cancer Staged    Staging form: Exocrine Pancreas, AJCC 8th Edition  - Clinical stage from 10/2/2024: Stage IV (cT2, cN0, cM1) - Signed by Ni Jackson MD on 10/4/2024     10/3/2024 Initial Diagnosis    Malignant tumor of head of pancreas     10/10/2024 -  Chemotherapy    OP PANCREATIC PACLitaxel Protein-Bound / Gemcitabine         REASON FOR VISIT: Pancreatic cancer    HISTORY OF PRESENT ILLNESS:   74 y.o.  female presents today for follow-up of her pancreatic cancer.  She is undergoing chemotherapy with Abraxane and gemcitabine.  Doing well clinically.  Still having considerable fatigue.  Overall no infections.  Pain seems relatively controlled.    Past medical history, social history and family history was reviewed 06/19/25 and unchanged from prior visit.    Review of Systems:    Review of Systems   Constitutional:  Positive for fatigue.   HENT:  Negative.     Eyes: Negative.    Respiratory: Negative.     Cardiovascular: Negative.    Gastrointestinal: Negative.    Endocrine: Negative.    Genitourinary: Negative.     Musculoskeletal: Negative.    Skin: Negative.    Neurological: Negative.    Hematological: Negative.    Psychiatric/Behavioral: Negative.              Medications:        Current Outpatient Medications:     amLODIPine-benazepril (LOTREL 5-10) 5-10 MG per capsule, Take 1 capsule by mouth Daily., Disp: , Rfl:     erythromycin (ROMYCIN) 5 MG/GM ophthalmic ointment, , Disp: , Rfl:     levothyroxine (SYNTHROID, LEVOTHROID) 50 MCG tablet, Take 1 tablet by mouth., Disp: , Rfl:     lidocaine-prilocaine (EMLA) 2.5-2.5 % cream, Apply 1 Application topically to the appropriate area as directed As Needed (45-60 minutes prior to port access.  Cover with saran/plastic wrap)., Disp: 30 g, Rfl: 2    megestrol (MEGACE) 40 MG/ML suspension, Take 20 mL by mouth Daily., Disp: 480 mL, Rfl: 1    naloxone (NARCAN) 4 MG/0.1ML  "nasal spray, Administer 1 spray into the nostril(s) as directed by provider As Needed for Opioid Reversal or Respiratory Depression., Disp: 1 each, Rfl: 0    ondansetron (ZOFRAN) 8 MG tablet, Take 1 tablet by mouth 3 (Three) Times a Day As Needed for Nausea or Vomiting., Disp: 30 tablet, Rfl: 5    ondansetron ODT (ZOFRAN-ODT) 8 MG disintegrating tablet, Place 1 tablet on the tongue Every 8 (Eight) Hours As Needed for Nausea or Vomiting., Disp: 30 tablet, Rfl: 5    [START ON 6/29/2025] oxyCODONE (ROXICODONE) 10 MG tablet, Take 1 tablet by mouth Every 6 (Six) Hours As Needed for Moderate Pain or Severe Pain for up to 30 days., Disp: 120 tablet, Rfl: 0    Pancrelipase, Lip-Prot-Amyl, (CREON) 0704-7221 units capsule delayed-release particles capsule, Take 8 capsules by mouth 3 (Three) Times a Day With Meals., Disp: 900 capsule, Rfl: 5    rosuvastatin (CRESTOR) 5 MG tablet, Take 1 tablet by mouth Daily., Disp: , Rfl:     Pain Medications              oxyCODONE (ROXICODONE) 10 MG tablet Starting on 6/29/2025. Take 1 tablet by mouth Every 6 (Six) Hours As Needed for Moderate Pain or Severe Pain for up to 30 days.               ALLERGIES:  No Known Allergies      Physical Exam    VITAL SIGNS:  /64 Comment: LUE  Pulse 90   Temp 97 °F (36.1 °C) (Temporal)   Resp 20   Ht 157.5 cm (62\")   Wt 46.7 kg (103 lb)   SpO2 98% Comment: RA  BMI 18.84 kg/m²     ECOG score: 1           Wt Readings from Last 3 Encounters:   06/19/25 46.7 kg (103 lb)   06/17/25 46.7 kg (103 lb)   06/09/25 46.4 kg (102 lb 6.4 oz)       Body mass index is 18.84 kg/m². Body surface area is 1.44 meters squared.       Performance Status: 1    General: well appearing, in no acute distress  HEENT: sclera anicteric, neck is supple  Lymphatics: no cervical, supraclavicular, or axillary adenopathy  Cardiovascular: regular rate and rhythm, no murmurs, rubs or gallops  Lungs: clear to auscultation bilaterally  Abdomen: soft, nontender, nondistended.  No " palpable organomegaly  Extremities: no lower extremity edema  Skin: no rashes, lesions, bruising, or petechiae  Msk:  Shows no weakness of the large muscle groups  Psych: Mood is stable        RECENT LABS:    Lab Results   Component Value Date    HGB 10.0 (L) 06/17/2025    HCT 31.8 (L) 06/17/2025    MCV 96.7 06/17/2025     06/17/2025    WBC 6.54 06/17/2025    NEUTROABS 3.80 06/17/2025    LYMPHSABS 1.45 06/17/2025    MONOSABS 0.95 (H) 06/17/2025    EOSABS 0.27 06/17/2025    BASOSABS 0.04 06/17/2025       Lab Results   Component Value Date    GLUCOSE 124 (H) 06/17/2025    BUN 12.8 06/17/2025    CREATININE 0.58 06/17/2025     (L) 06/17/2025    K 4.2 06/17/2025     06/17/2025    CO2 21.0 (L) 06/17/2025    CALCIUM 8.7 06/17/2025    PROTEINTOT 6.7 06/17/2025    ALBUMIN 3.6 06/17/2025    BILITOT 0.4 06/17/2025    ALKPHOS 120 (H) 06/17/2025    AST 15 06/17/2025    ALT 12 06/17/2025     Lab Results   Component Value Date     649.0 (H) 06/03/2025     625.0 (H) 05/06/2025     1,200.0 (H) 04/08/2025     931.0 (H) 03/25/2025     1,956.0 (H) 02/25/2025     CT Abdomen Pelvis With & Without Contrast    Result Date: 2/26/2025  Impression: 1.No significant change in the pancreatic head neoplasm as above 2.Stable mild intra and extrahepatic biliary ductal dilatation to the level of the pancreatic head mass. 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 4.Slight interval decrease in size of omental nodules. 5.No significant change in multiple hypodense liver lesions compatible with metastatic disease Electronically Signed: Harvey Alvarez MD  2/26/2025 9:50 AM EST  Workstation ID: [id number]    CT Abdomen Pelvis With & Without Contrast    Result Date: 12/30/2024  Impression: Primary pancreatic head neoplasm is grossly similar in size. Grossly similar innumerable hepatic  metastases, omental nodularity, and mesenteric lymphadenopathy. Slight interval recanalization of the previously occluded portosplenomesenteric venous confluence. New small volume free fluid. Electronically Signed: Merrill Duncan MD  12/30/2024 6:17 PM EST  Workstation ID: [id number]        Assessment/Plan    1.  Stage IV adenocarcinoma of the pancreas.   Continue Abraxane and gemcitabine.  I have dose reduce her Abraxane and gemcitabine for the severe fatigue.  For now she is doing well.  I am going to scan her in 1 month.  If she does have progressive disease I will likely offer her modified FOLFIRINOX.   For now she is responding to treatment.      2.  Heartburn secondary to chemotherapy.  on PPI and baking soda with water.      3.  Constipation.  Improve constipation with MiraLAX twice a day along with stool softener.    4.  Chemotherapy-induced neuropathy.  Mostly numbness.  No significant pain for now.      Total time of patient care on day of service including time prior to, face to face with patient, and following visit spent in reviewing records, lab results, imaging studies, discussion with patient, and documentation/charting was >33 minutes.     Ni Jackson MD  [facility] Hematology and Oncology    Return on: 07/17/25  Return in (Approximately): 1 month, Schedule with next infusion    Orders Placed This Encounter   Procedures    CT Abdomen Pelvis With & Without Contrast    Cancer Antigen 19-9    Comprehensive metabolic panel    Comprehensive metabolic panel    Cancer Antigen 19-9    Comprehensive metabolic panel    Comprehensive metabolic panel    CBC and Differential    CBC and Differential    CBC and Differential    CBC and Differential       6/19/2025     Future Appointments         Provider Department Center    6/19/2025 10:30 AM CHAIR 12 [facility] OUTPATIENT ONCOLOGY TC    7/1/2025 2:00 PM ACCESS AND LAB DRAW CHAIR 1 [facility] OUTPATIENT ONCOLOGY TC     7/15/2025 10:15 AM ACCESS AND LAB DRAW CHAIR 1 [facility] OUTPATIENT ONCOLOGY TC    7/15/2025 11:15 AM (Arrive by 11:00 AM) TC BRAN CT 1 [facility] CT AT [facility]    7/17/2025 9:00 AM (Arrive by 8:45 AM) Ni Jackson MD [facility] HEMATOLOGY & ONCOLOGY TC    7/17/2025 9:30 AM CHAIR 19 [facility] OUTPATIENT ONCOLOGY TC    9/9/2025 1:00 PM (Arrive by 12:45 PM) Michelle Arroyo PA-C [facility] PALLIATIVE CARE TC

## 2025-06-23 ENCOUNTER — TELEPHONE (OUTPATIENT)
Dept: PALLIATIVE CARE | Facility: CLINIC | Age: 75
End: 2025-06-23
Payer: MEDICARE

## 2025-06-23 DIAGNOSIS — M79.89 RIGHT LEG SWELLING: Primary | ICD-10-CM

## 2025-06-23 DIAGNOSIS — C25.0 MALIGNANT TUMOR OF HEAD OF PANCREAS: ICD-10-CM

## 2025-06-23 NOTE — TELEPHONE ENCOUNTER
PATIENT CALLED AND INFORMED THAT SHE'S BEEN HAVING TERRIBLE PAIN IN HER LEG WHERE HER CALF IS. PATIENT STATED THAT IT FEELS LIKE SOMETHING IS SQUEEZING HER TIGHT. PATIENT NOTICED THIS PAIN AFTER LAST CHEMO TREATMENT. PLEASE ADVISE.

## 2025-06-23 NOTE — TELEPHONE ENCOUNTER
"RN returned call to pt. Pt c/o swelling, warmth, and pain in her right calf. Pt states,\"it feels like someone is squeezing my leg\". Symptoms started after last chemo 6/19. Pt also reports she always has swelling in her right ankle after chemo.   RN discussed with Dr Ellis. Dr Ellis would like pt to have an duplex US of right lower extremity to rule out dvt. Dr Ellis will also send prescription for Eliquis in the event the US is positive. RN called pt  back and let her know Dr Ellis's recommendations. Pt verbalized understanding.   "

## 2025-06-24 ENCOUNTER — TELEPHONE (OUTPATIENT)
Dept: ONCOLOGY | Facility: CLINIC | Age: 75
End: 2025-06-24
Payer: MEDICARE

## 2025-06-24 ENCOUNTER — HOSPITAL ENCOUNTER (OUTPATIENT)
Dept: CARDIOLOGY | Facility: HOSPITAL | Age: 75
Discharge: HOME | End: 2025-06-24
Admitting: INTERNAL MEDICINE
Payer: MEDICARE

## 2025-06-24 VITALS — BODY MASS INDEX: 18.95 KG/M2 | WEIGHT: 102.95 LBS | HEIGHT: 62 IN

## 2025-06-24 DIAGNOSIS — M79.89 RIGHT LEG SWELLING: ICD-10-CM

## 2025-06-24 DIAGNOSIS — I82.491 DEEP VEIN THROMBOSIS (DVT) OF OTHER VEIN OF RIGHT LOWER EXTREMITY, UNSPECIFIED CHRONICITY: Primary | ICD-10-CM

## 2025-06-24 DIAGNOSIS — C25.0 MALIGNANT TUMOR OF HEAD OF PANCREAS: ICD-10-CM

## 2025-06-24 LAB
BH CV LOW VAS RIGHT PERONEAL VESSEL: 1
BH CV LOW VAS RIGHT POPLITEAL SPONT: 1
BH CV LOW VAS RIGHT POSTERIOR TIBIAL VESSEL: 1
BH CV LOW VAS RIGHT SOLEAL VESSEL: 1
BH CV LOWER VASCULAR LEFT COMMON FEMORAL PHASIC: NORMAL
BH CV LOWER VASCULAR LEFT COMMON FEMORAL SPONT: NORMAL
BH CV LOWER VASCULAR RIGHT COMMON FEMORAL AUGMENT: NORMAL
BH CV LOWER VASCULAR RIGHT COMMON FEMORAL COMPRESS: NORMAL
BH CV LOWER VASCULAR RIGHT COMMON FEMORAL PHASIC: NORMAL
BH CV LOWER VASCULAR RIGHT COMMON FEMORAL SPONT: NORMAL
BH CV LOWER VASCULAR RIGHT DISTAL FEMORAL AUGMENT: NORMAL
BH CV LOWER VASCULAR RIGHT DISTAL FEMORAL COMPRESS: NORMAL
BH CV LOWER VASCULAR RIGHT DISTAL FEMORAL PHASIC: NORMAL
BH CV LOWER VASCULAR RIGHT DISTAL FEMORAL SPONT: NORMAL
BH CV LOWER VASCULAR RIGHT GASTRONEMIUS COMPRESS: NORMAL
BH CV LOWER VASCULAR RIGHT GREATER SAPH AK COMPRESS: NORMAL
BH CV LOWER VASCULAR RIGHT GREATER SAPH BK COMPRESS: NORMAL
BH CV LOWER VASCULAR RIGHT LESSER SAPH COMPRESS: NORMAL
BH CV LOWER VASCULAR RIGHT MID FEMORAL AUGMENT: NORMAL
BH CV LOWER VASCULAR RIGHT MID FEMORAL COMPRESS: NORMAL
BH CV LOWER VASCULAR RIGHT MID FEMORAL PHASIC: NORMAL
BH CV LOWER VASCULAR RIGHT MID FEMORAL SPONT: NORMAL
BH CV LOWER VASCULAR RIGHT PERONEAL COMPRESS: NORMAL
BH CV LOWER VASCULAR RIGHT POPLITEAL AUGMENT: NORMAL
BH CV LOWER VASCULAR RIGHT POPLITEAL COMPRESS: NORMAL
BH CV LOWER VASCULAR RIGHT POPLITEAL PHASIC: NORMAL
BH CV LOWER VASCULAR RIGHT POPLITEAL SPONT: NORMAL
BH CV LOWER VASCULAR RIGHT POSTERIOR TIBIAL COMPRESS: NORMAL
BH CV LOWER VASCULAR RIGHT PROFUNDA FEMORAL PHASIC: NORMAL
BH CV LOWER VASCULAR RIGHT PROFUNDA FEMORAL SPONT: NORMAL
BH CV LOWER VASCULAR RIGHT PROXIMAL FEMORAL AUGMENT: NORMAL
BH CV LOWER VASCULAR RIGHT PROXIMAL FEMORAL COMPRESS: NORMAL
BH CV LOWER VASCULAR RIGHT PROXIMAL FEMORAL PHASIC: NORMAL
BH CV LOWER VASCULAR RIGHT PROXIMAL FEMORAL SPONT: NORMAL
BH CV LOWER VASCULAR RIGHT SAPHENOFEMORAL JUNCTION AUGMENT: NORMAL
BH CV LOWER VASCULAR RIGHT SAPHENOFEMORAL JUNCTION COMPRESS: NORMAL
BH CV LOWER VASCULAR RIGHT SAPHENOFEMORAL JUNCTION PHASIC: NORMAL
BH CV LOWER VASCULAR RIGHT SAPHENOFEMORAL JUNCTION SPONT: NORMAL
BH CV LOWER VASCULAR RIGHT SOLEAL COMPRESS: NORMAL
BH CV VAS PRELIMINARY FINDINGS SCRIPTING: 1

## 2025-06-24 PROCEDURE — 93971 EXTREMITY STUDY: CPT

## 2025-06-24 PROCEDURE — 93971 EXTREMITY STUDY: CPT | Performed by: INTERNAL MEDICINE

## 2025-06-24 NOTE — TELEPHONE ENCOUNTER
Pt reported her pharmacy does not have the Eliquis starter pack. RN resent prescription to [facility]. Pt's  will  here. Pt and  verbalized understanding.

## 2025-07-01 ENCOUNTER — HOSPITAL ENCOUNTER (OUTPATIENT)
Dept: ONCOLOGY | Facility: HOSPITAL | Age: 75
Discharge: HOME | End: 2025-07-01
Admitting: INTERNAL MEDICINE
Payer: MEDICARE

## 2025-07-01 VITALS
SYSTOLIC BLOOD PRESSURE: 131 MMHG | WEIGHT: 100.8 LBS | HEART RATE: 91 BPM | DIASTOLIC BLOOD PRESSURE: 66 MMHG | HEIGHT: 62 IN | BODY MASS INDEX: 18.55 KG/M2 | TEMPERATURE: 97.4 F | RESPIRATION RATE: 16 BRPM

## 2025-07-01 DIAGNOSIS — C25.0 MALIGNANT TUMOR OF HEAD OF PANCREAS: ICD-10-CM

## 2025-07-01 DIAGNOSIS — Z45.2 ENCOUNTER FOR CARE RELATED TO PORT-A-CATH: Primary | ICD-10-CM

## 2025-07-01 LAB
ALBUMIN SERPL-MCNC: 3.6 G/DL (ref 3.5–5.2)
ALBUMIN/GLOB SERPL: 1.2 G/DL
ALP SERPL-CCNC: 173 U/L (ref 39–117)
ALT SERPL W P-5'-P-CCNC: 15 U/L (ref 1–33)
ANION GAP SERPL CALCULATED.3IONS-SCNC: 11 MMOL/L (ref 5–15)
AST SERPL-CCNC: 24 U/L (ref 1–32)
BASOPHILS # BLD AUTO: 0.05 10*3/MM3 (ref 0–0.2)
BASOPHILS NFR BLD AUTO: 0.6 % (ref 0–1.5)
BILIRUB SERPL-MCNC: 0.3 MG/DL (ref 0–1.2)
BUN SERPL-MCNC: 12.7 MG/DL (ref 8–23)
BUN/CREAT SERPL: 27 (ref 7–25)
CALCIUM SPEC-SCNC: 8.7 MG/DL (ref 8.6–10.5)
CANCER AG19-9 SERPL-ACNC: 523 U/ML
CHLORIDE SERPL-SCNC: 104 MMOL/L (ref 98–107)
CO2 SERPL-SCNC: 22 MMOL/L (ref 22–29)
CREAT SERPL-MCNC: 0.47 MG/DL (ref 0.57–1)
DEPRECATED RDW RBC AUTO: 59.5 FL (ref 37–54)
EGFRCR SERPLBLD CKD-EPI 2021: 98.8 ML/MIN/1.73
EOSINOPHIL # BLD AUTO: 0.15 10*3/MM3 (ref 0–0.4)
EOSINOPHIL NFR BLD AUTO: 1.9 % (ref 0.3–6.2)
ERYTHROCYTE [DISTWIDTH] IN BLOOD BY AUTOMATED COUNT: 17 % (ref 12.3–15.4)
GLOBULIN UR ELPH-MCNC: 2.9 GM/DL
GLUCOSE SERPL-MCNC: 119 MG/DL (ref 65–99)
HCT VFR BLD AUTO: 31.7 % (ref 34–46.6)
HGB BLD-MCNC: 10 G/DL (ref 12–15.9)
IMM GRANULOCYTES # BLD AUTO: 0.03 10*3/MM3 (ref 0–0.05)
IMM GRANULOCYTES NFR BLD AUTO: 0.4 % (ref 0–0.5)
LYMPHOCYTES # BLD AUTO: 1.38 10*3/MM3 (ref 0.7–3.1)
LYMPHOCYTES NFR BLD AUTO: 17.4 % (ref 19.6–45.3)
MCH RBC QN AUTO: 29.6 PG (ref 26.6–33)
MCHC RBC AUTO-ENTMCNC: 31.5 G/DL (ref 31.5–35.7)
MCV RBC AUTO: 93.8 FL (ref 79–97)
MONOCYTES # BLD AUTO: 1.06 10*3/MM3 (ref 0.1–0.9)
MONOCYTES NFR BLD AUTO: 13.4 % (ref 5–12)
NEUTROPHILS NFR BLD AUTO: 5.26 10*3/MM3 (ref 1.7–7)
NEUTROPHILS NFR BLD AUTO: 66.3 % (ref 42.7–76)
PLATELET # BLD AUTO: 303 10*3/MM3 (ref 140–450)
PMV BLD AUTO: 9.5 FL (ref 6–12)
POTASSIUM SERPL-SCNC: 4 MMOL/L (ref 3.5–5.2)
PROT SERPL-MCNC: 6.5 G/DL (ref 6–8.5)
RBC # BLD AUTO: 3.38 10*6/MM3 (ref 3.77–5.28)
SODIUM SERPL-SCNC: 137 MMOL/L (ref 136–145)
WBC NRBC COR # BLD AUTO: 7.93 10*3/MM3 (ref 3.4–10.8)

## 2025-07-01 PROCEDURE — 86301 IMMUNOASSAY TUMOR CA 19-9: CPT | Performed by: INTERNAL MEDICINE

## 2025-07-01 PROCEDURE — 25010000002 HEPARIN LOCK FLUSH PER 10 UNITS: Performed by: INTERNAL MEDICINE

## 2025-07-01 PROCEDURE — 36591 DRAW BLOOD OFF VENOUS DEVICE: CPT

## 2025-07-01 PROCEDURE — 80053 COMPREHEN METABOLIC PANEL: CPT | Performed by: INTERNAL MEDICINE

## 2025-07-01 PROCEDURE — 85025 COMPLETE CBC W/AUTO DIFF WBC: CPT | Performed by: INTERNAL MEDICINE

## 2025-07-01 RX ORDER — SODIUM CHLORIDE 0.9 % (FLUSH) 0.9 %
10 SYRINGE (ML) INJECTION AS NEEDED
Status: DISCONTINUED | OUTPATIENT
Start: 2025-07-01 | End: 2025-07-02 | Stop reason: HOSPADM

## 2025-07-01 RX ORDER — SODIUM CHLORIDE 0.9 % (FLUSH) 0.9 %
20 SYRINGE (ML) INJECTION AS NEEDED
Status: CANCELLED | OUTPATIENT
Start: 2025-07-01

## 2025-07-01 RX ORDER — HEPARIN SODIUM (PORCINE) LOCK FLUSH IV SOLN 100 UNIT/ML 100 UNIT/ML
500 SOLUTION INTRAVENOUS AS NEEDED
Status: CANCELLED | OUTPATIENT
Start: 2025-07-01

## 2025-07-01 RX ORDER — HEPARIN SODIUM (PORCINE) LOCK FLUSH IV SOLN 100 UNIT/ML 100 UNIT/ML
500 SOLUTION INTRAVENOUS AS NEEDED
Status: DISCONTINUED | OUTPATIENT
Start: 2025-07-01 | End: 2025-07-02 | Stop reason: HOSPADM

## 2025-07-01 RX ORDER — SODIUM CHLORIDE 0.9 % (FLUSH) 0.9 %
10 SYRINGE (ML) INJECTION AS NEEDED
Status: CANCELLED | OUTPATIENT
Start: 2025-07-01

## 2025-07-01 RX ADMIN — Medication 10 ML: at 14:03

## 2025-07-01 RX ADMIN — HEPARIN 500 UNITS: 100 SYRINGE at 14:03

## 2025-07-03 ENCOUNTER — HOSPITAL ENCOUNTER (OUTPATIENT)
Dept: ONCOLOGY | Facility: HOSPITAL | Age: 75
Discharge: HOME | End: 2025-07-03
Admitting: INTERNAL MEDICINE
Payer: MEDICARE

## 2025-07-03 VITALS
SYSTOLIC BLOOD PRESSURE: 120 MMHG | RESPIRATION RATE: 16 BRPM | WEIGHT: 101 LBS | DIASTOLIC BLOOD PRESSURE: 58 MMHG | HEIGHT: 62 IN | HEART RATE: 89 BPM | TEMPERATURE: 97.1 F | BODY MASS INDEX: 18.58 KG/M2

## 2025-07-03 DIAGNOSIS — C25.0 MALIGNANT TUMOR OF HEAD OF PANCREAS: Primary | ICD-10-CM

## 2025-07-03 DIAGNOSIS — Z45.2 ENCOUNTER FOR CARE RELATED TO PORT-A-CATH: ICD-10-CM

## 2025-07-03 PROCEDURE — 25010000002 HEPARIN LOCK FLUSH PER 10 UNITS: Performed by: INTERNAL MEDICINE

## 2025-07-03 PROCEDURE — 25010000002 PACLITAXEL PROTEIN-BOUND PART PER 1 MG: Performed by: INTERNAL MEDICINE

## 2025-07-03 PROCEDURE — 25010000002 GEMCITABINE 1 GM/26.3ML SOLUTION 26.3 ML VIAL: Performed by: INTERNAL MEDICINE

## 2025-07-03 PROCEDURE — 96375 TX/PRO/DX INJ NEW DRUG ADDON: CPT

## 2025-07-03 PROCEDURE — 25010000002 DEXAMETHASONE SODIUM PHOSPHATE 100 MG/10ML SOLUTION: Performed by: INTERNAL MEDICINE

## 2025-07-03 PROCEDURE — 25810000003 SODIUM CHLORIDE 0.9 % SOLUTION 250 ML FLEX CONT: Performed by: INTERNAL MEDICINE

## 2025-07-03 PROCEDURE — 25010000002 GEMCITABINE 200 MG/5.26ML SOLUTION 5.26 ML VIAL: Performed by: INTERNAL MEDICINE

## 2025-07-03 PROCEDURE — 96413 CHEMO IV INFUSION 1 HR: CPT

## 2025-07-03 PROCEDURE — 25810000003 SODIUM CHLORIDE 0.9 % SOLUTION: Performed by: INTERNAL MEDICINE

## 2025-07-03 PROCEDURE — 96417 CHEMO IV INFUS EACH ADDL SEQ: CPT

## 2025-07-03 RX ORDER — HEPARIN SODIUM (PORCINE) LOCK FLUSH IV SOLN 100 UNIT/ML 100 UNIT/ML
500 SOLUTION INTRAVENOUS AS NEEDED
OUTPATIENT
Start: 2025-07-03

## 2025-07-03 RX ORDER — HEPARIN SODIUM (PORCINE) LOCK FLUSH IV SOLN 100 UNIT/ML 100 UNIT/ML
500 SOLUTION INTRAVENOUS AS NEEDED
Status: DISCONTINUED | OUTPATIENT
Start: 2025-07-03 | End: 2025-07-04 | Stop reason: HOSPADM

## 2025-07-03 RX ORDER — SODIUM CHLORIDE 0.9 % (FLUSH) 0.9 %
10 SYRINGE (ML) INJECTION AS NEEDED
OUTPATIENT
Start: 2025-07-03

## 2025-07-03 RX ORDER — SODIUM CHLORIDE 9 MG/ML
20 INJECTION, SOLUTION INTRAVENOUS ONCE
Status: COMPLETED | OUTPATIENT
Start: 2025-07-03 | End: 2025-07-03

## 2025-07-03 RX ORDER — SODIUM CHLORIDE 0.9 % (FLUSH) 0.9 %
20 SYRINGE (ML) INJECTION AS NEEDED
OUTPATIENT
Start: 2025-07-03

## 2025-07-03 RX ORDER — PACLITAXEL 100 MG/20ML
90 INJECTION, POWDER, LYOPHILIZED, FOR SUSPENSION INTRAVENOUS ONCE
Status: COMPLETED | OUTPATIENT
Start: 2025-07-03 | End: 2025-07-03

## 2025-07-03 RX ADMIN — PACLITAXEL 135 MG: 100 INJECTION, POWDER, LYOPHILIZED, FOR SUSPENSION INTRAVENOUS at 09:23

## 2025-07-03 RX ADMIN — HEPARIN 500 UNITS: 100 SYRINGE at 11:04

## 2025-07-03 RX ADMIN — DEXAMETHASONE SODIUM PHOSPHATE 12 MG: 10 INJECTION, SOLUTION INTRAMUSCULAR; INTRAVENOUS at 08:45

## 2025-07-03 RX ADMIN — SODIUM CHLORIDE 1150 MG: 9 INJECTION, SOLUTION INTRAVENOUS at 10:26

## 2025-07-03 RX ADMIN — SODIUM CHLORIDE 20 ML/HR: 9 INJECTION, SOLUTION INTRAVENOUS at 09:19

## 2025-07-07 ENCOUNTER — TELEPHONE (OUTPATIENT)
Dept: PALLIATIVE CARE | Facility: CLINIC | Age: 75
End: 2025-07-07
Payer: MEDICARE

## 2025-07-07 DIAGNOSIS — G89.3 CANCER RELATED PAIN: ICD-10-CM

## 2025-07-07 RX ORDER — OXYCODONE HYDROCHLORIDE 10 MG/1
10 TABLET ORAL EVERY 6 HOURS PRN
Qty: 120 TABLET | Refills: 0 | Status: SHIPPED | OUTPATIENT
Start: 2025-07-07 | End: 2025-08-06

## 2025-07-07 NOTE — TELEPHONE ENCOUNTER
LETICIA #: [id number]    Medication requested: oxyCODONE (ROXICODONE) 10 MG tablet     Last fill date: 5/30/25    Last appointment: 6/9/25    Next appointment: 9/9/25

## 2025-07-07 NOTE — TELEPHONE ENCOUNTER
Caller: Claudia Flores    Relationship: Self    Best call back number:     [phone]       Requested Prescriptions:     oxyCODONE (ROXICODONE) 10 MG tablet  10 mg, Every 6 Hours PRN        Pharmacy where request should be sent:  [id number] [address] [facility] - [phone] [facility] [phone]  [phone]     Last office visit with prescribing clinician: 6/9/2025   Last telemedicine visit with prescribing clinician: Visit date not found   Next office visit with prescribing clinician: 9/9/2025     Additional details provided by patient:     Does the patient have less than a 3 day supply:  [x] Yes  [] No    Would you like a call back once the refill request has been completed: [] Yes [] No    If the office needs to give you a call back, can they leave a voicemail: [] Yes [] No    Tamanna Ruiz   07/07/25 09:35 EDT

## 2025-07-08 ENCOUNTER — TELEPHONE (OUTPATIENT)
Dept: ONCOLOGY | Facility: CLINIC | Age: 75
End: 2025-07-08
Payer: MEDICARE

## 2025-07-08 NOTE — TELEPHONE ENCOUNTER
RN returned call to pt. Pt reports she has enough medication for about one more week. Pt has appt with Dr Ellis next Thursday. RN let pt know Dr Ellis is not in the clinic today. RN will discuss with Dr Ellis tomorrow and let pt know. Pt verbalized understanding. Pt also reports her leg is better, swelling is gone.

## 2025-07-08 NOTE — TELEPHONE ENCOUNTER
Caller: Claudia Flores    Relationship: Self    Best call back number: [phone]     Which medication are you concerned about: RODERICK    Who prescribed you this medication: DO    When did you start taking this medication: 2 WEEKS PRIOR    What are your concerns: CALLING TO VERIFY IF PATIENT TIS TO CONTINUE THIS MEDICATION AND IF SO REQUEST A REFILL TO [facility] IF THIS WILL BE AN ONGOING TREATMENT OR [facility] IF THIS WILL BE MORE SHORT TERM

## 2025-07-09 NOTE — TELEPHONE ENCOUNTER
RN called pt back and let her know Dr Ellis recommends her to take Eliquis indefinitely. Pt verbalized understanding.

## 2025-07-15 ENCOUNTER — HOSPITAL ENCOUNTER (OUTPATIENT)
Dept: CT IMAGING | Facility: HOSPITAL | Age: 75
Discharge: HOME | End: 2025-07-15
Payer: MEDICARE

## 2025-07-15 ENCOUNTER — HOSPITAL ENCOUNTER (OUTPATIENT)
Dept: ONCOLOGY | Facility: HOSPITAL | Age: 75
Discharge: HOME | End: 2025-07-15
Payer: MEDICARE

## 2025-07-15 VITALS
HEART RATE: 82 BPM | HEIGHT: 62 IN | RESPIRATION RATE: 18 BRPM | SYSTOLIC BLOOD PRESSURE: 121 MMHG | TEMPERATURE: 98.2 F | BODY MASS INDEX: 18.95 KG/M2 | WEIGHT: 103 LBS | DIASTOLIC BLOOD PRESSURE: 57 MMHG

## 2025-07-15 DIAGNOSIS — C25.0 MALIGNANT TUMOR OF HEAD OF PANCREAS: ICD-10-CM

## 2025-07-15 DIAGNOSIS — Z45.2 ENCOUNTER FOR CARE RELATED TO PORT-A-CATH: Primary | ICD-10-CM

## 2025-07-15 LAB
ALBUMIN SERPL-MCNC: 3.8 G/DL (ref 3.5–5.2)
ALBUMIN/GLOB SERPL: 1.5 G/DL
ALP SERPL-CCNC: 124 U/L (ref 39–117)
ALT SERPL W P-5'-P-CCNC: 13 U/L (ref 1–33)
ANION GAP SERPL CALCULATED.3IONS-SCNC: 10 MMOL/L (ref 5–15)
AST SERPL-CCNC: 16 U/L (ref 1–32)
BASOPHILS # BLD AUTO: 0.05 10*3/MM3 (ref 0–0.2)
BASOPHILS NFR BLD AUTO: 0.6 % (ref 0–1.5)
BILIRUB SERPL-MCNC: 0.3 MG/DL (ref 0–1.2)
BUN SERPL-MCNC: 16.5 MG/DL (ref 8–23)
BUN/CREAT SERPL: 27 (ref 7–25)
CALCIUM SPEC-SCNC: 8.7 MG/DL (ref 8.6–10.5)
CHLORIDE SERPL-SCNC: 103 MMOL/L (ref 98–107)
CO2 SERPL-SCNC: 21 MMOL/L (ref 22–29)
CREAT SERPL-MCNC: 0.61 MG/DL (ref 0.57–1)
DEPRECATED RDW RBC AUTO: 61.7 FL (ref 37–54)
EGFRCR SERPLBLD CKD-EPI 2021: 92.8 ML/MIN/1.73
EOSINOPHIL # BLD AUTO: 0.19 10*3/MM3 (ref 0–0.4)
EOSINOPHIL NFR BLD AUTO: 2.3 % (ref 0.3–6.2)
ERYTHROCYTE [DISTWIDTH] IN BLOOD BY AUTOMATED COUNT: 18.2 % (ref 12.3–15.4)
GLOBULIN UR ELPH-MCNC: 2.5 GM/DL
GLUCOSE SERPL-MCNC: 106 MG/DL (ref 65–99)
HCT VFR BLD AUTO: 32.4 % (ref 34–46.6)
HGB BLD-MCNC: 10.5 G/DL (ref 12–15.9)
IMM GRANULOCYTES # BLD AUTO: 0.04 10*3/MM3 (ref 0–0.05)
IMM GRANULOCYTES NFR BLD AUTO: 0.5 % (ref 0–0.5)
LYMPHOCYTES # BLD AUTO: 1.5 10*3/MM3 (ref 0.7–3.1)
LYMPHOCYTES NFR BLD AUTO: 18.3 % (ref 19.6–45.3)
MCH RBC QN AUTO: 30.3 PG (ref 26.6–33)
MCHC RBC AUTO-ENTMCNC: 32.4 G/DL (ref 31.5–35.7)
MCV RBC AUTO: 93.6 FL (ref 79–97)
MONOCYTES # BLD AUTO: 1.07 10*3/MM3 (ref 0.1–0.9)
MONOCYTES NFR BLD AUTO: 13.1 % (ref 5–12)
NEUTROPHILS NFR BLD AUTO: 5.33 10*3/MM3 (ref 1.7–7)
NEUTROPHILS NFR BLD AUTO: 65.2 % (ref 42.7–76)
PLATELET # BLD AUTO: 227 10*3/MM3 (ref 140–450)
PMV BLD AUTO: 9.6 FL (ref 6–12)
POTASSIUM SERPL-SCNC: 4.2 MMOL/L (ref 3.5–5.2)
PROT SERPL-MCNC: 6.3 G/DL (ref 6–8.5)
RBC # BLD AUTO: 3.46 10*6/MM3 (ref 3.77–5.28)
SODIUM SERPL-SCNC: 134 MMOL/L (ref 136–145)
WBC NRBC COR # BLD AUTO: 8.18 10*3/MM3 (ref 3.4–10.8)

## 2025-07-15 PROCEDURE — 85025 COMPLETE CBC W/AUTO DIFF WBC: CPT | Performed by: INTERNAL MEDICINE

## 2025-07-15 PROCEDURE — 80053 COMPREHEN METABOLIC PANEL: CPT | Performed by: INTERNAL MEDICINE

## 2025-07-15 PROCEDURE — 36591 DRAW BLOOD OFF VENOUS DEVICE: CPT

## 2025-07-15 PROCEDURE — 74178 CT ABD&PLV WO CNTR FLWD CNTR: CPT

## 2025-07-15 PROCEDURE — 25010000002 HEPARIN LOCK FLUSH PER 10 UNITS: Performed by: INTERNAL MEDICINE

## 2025-07-15 PROCEDURE — 25510000001 IOPAMIDOL PER 1 ML: Performed by: INTERNAL MEDICINE

## 2025-07-15 RX ORDER — SODIUM CHLORIDE 0.9 % (FLUSH) 0.9 %
10 SYRINGE (ML) INJECTION AS NEEDED
Status: CANCELLED | OUTPATIENT
Start: 2025-07-15

## 2025-07-15 RX ORDER — SODIUM CHLORIDE 0.9 % (FLUSH) 0.9 %
20 SYRINGE (ML) INJECTION AS NEEDED
Status: CANCELLED | OUTPATIENT
Start: 2025-07-15

## 2025-07-15 RX ORDER — HEPARIN SODIUM (PORCINE) LOCK FLUSH IV SOLN 100 UNIT/ML 100 UNIT/ML
500 SOLUTION INTRAVENOUS AS NEEDED
Status: CANCELLED | OUTPATIENT
Start: 2025-07-15

## 2025-07-15 RX ORDER — HEPARIN SODIUM (PORCINE) LOCK FLUSH IV SOLN 100 UNIT/ML 100 UNIT/ML
500 SOLUTION INTRAVENOUS AS NEEDED
Status: DISCONTINUED | OUTPATIENT
Start: 2025-07-15 | End: 2025-07-16 | Stop reason: HOSPADM

## 2025-07-15 RX ORDER — IOPAMIDOL 755 MG/ML
85 INJECTION, SOLUTION INTRAVASCULAR
Status: COMPLETED | OUTPATIENT
Start: 2025-07-15 | End: 2025-07-15

## 2025-07-15 RX ADMIN — IOPAMIDOL 85 ML: 755 INJECTION, SOLUTION INTRAVENOUS at 11:18

## 2025-07-15 RX ADMIN — HEPARIN 500 UNITS: 100 SYRINGE at 10:35

## 2025-07-17 ENCOUNTER — OFFICE VISIT (OUTPATIENT)
Dept: ONCOLOGY | Facility: CLINIC | Age: 75
End: 2025-07-17
Payer: MEDICARE

## 2025-07-17 ENCOUNTER — HOSPITAL ENCOUNTER (OUTPATIENT)
Dept: ONCOLOGY | Facility: HOSPITAL | Age: 75
Discharge: HOME | End: 2025-07-17
Admitting: INTERNAL MEDICINE
Payer: MEDICARE

## 2025-07-17 VITALS
DIASTOLIC BLOOD PRESSURE: 69 MMHG | WEIGHT: 102 LBS | HEART RATE: 85 BPM | BODY MASS INDEX: 18.77 KG/M2 | SYSTOLIC BLOOD PRESSURE: 117 MMHG | RESPIRATION RATE: 16 BRPM | TEMPERATURE: 97.1 F | HEIGHT: 62 IN | OXYGEN SATURATION: 100 %

## 2025-07-17 DIAGNOSIS — C25.0 MALIGNANT TUMOR OF HEAD OF PANCREAS: Primary | ICD-10-CM

## 2025-07-17 DIAGNOSIS — K86.89 PANCREATIC INSUFFICIENCY: ICD-10-CM

## 2025-07-17 DIAGNOSIS — Z45.2 ENCOUNTER FOR CARE RELATED TO PORT-A-CATH: ICD-10-CM

## 2025-07-17 PROCEDURE — 25010000002 DEXAMETHASONE SODIUM PHOSPHATE 100 MG/10ML SOLUTION: Performed by: INTERNAL MEDICINE

## 2025-07-17 PROCEDURE — 25810000003 SODIUM CHLORIDE 0.9 % SOLUTION 250 ML FLEX CONT: Performed by: INTERNAL MEDICINE

## 2025-07-17 PROCEDURE — 96417 CHEMO IV INFUS EACH ADDL SEQ: CPT

## 2025-07-17 PROCEDURE — 96375 TX/PRO/DX INJ NEW DRUG ADDON: CPT

## 2025-07-17 PROCEDURE — 25010000002 GEMCITABINE 200 MG/5.26ML SOLUTION 5.26 ML VIAL: Performed by: INTERNAL MEDICINE

## 2025-07-17 PROCEDURE — 25010000002 GEMCITABINE 1 GM/26.3ML SOLUTION 26.3 ML VIAL: Performed by: INTERNAL MEDICINE

## 2025-07-17 PROCEDURE — 96413 CHEMO IV INFUSION 1 HR: CPT

## 2025-07-17 PROCEDURE — 25810000003 SODIUM CHLORIDE 0.9 % SOLUTION: Performed by: INTERNAL MEDICINE

## 2025-07-17 PROCEDURE — 25010000002 PACLITAXEL PROTEIN-BOUND PART PER 1 MG: Performed by: INTERNAL MEDICINE

## 2025-07-17 PROCEDURE — 25010000002 HEPARIN LOCK FLUSH PER 10 UNITS: Performed by: INTERNAL MEDICINE

## 2025-07-17 RX ORDER — HEPARIN SODIUM (PORCINE) LOCK FLUSH IV SOLN 100 UNIT/ML 100 UNIT/ML
500 SOLUTION INTRAVENOUS AS NEEDED
OUTPATIENT
Start: 2025-07-17

## 2025-07-17 RX ORDER — SODIUM CHLORIDE 0.9 % (FLUSH) 0.9 %
20 SYRINGE (ML) INJECTION AS NEEDED
OUTPATIENT
Start: 2025-07-17

## 2025-07-17 RX ORDER — SODIUM CHLORIDE 0.9 % (FLUSH) 0.9 %
10 SYRINGE (ML) INJECTION AS NEEDED
OUTPATIENT
Start: 2025-07-17

## 2025-07-17 RX ORDER — PACLITAXEL 100 MG/20ML
90 INJECTION, POWDER, LYOPHILIZED, FOR SUSPENSION INTRAVENOUS ONCE
Status: COMPLETED | OUTPATIENT
Start: 2025-07-17 | End: 2025-07-17

## 2025-07-17 RX ORDER — HEPARIN SODIUM (PORCINE) LOCK FLUSH IV SOLN 100 UNIT/ML 100 UNIT/ML
500 SOLUTION INTRAVENOUS AS NEEDED
Status: DISCONTINUED | OUTPATIENT
Start: 2025-07-17 | End: 2025-07-18 | Stop reason: HOSPADM

## 2025-07-17 RX ORDER — SODIUM CHLORIDE 9 MG/ML
20 INJECTION, SOLUTION INTRAVENOUS ONCE
Status: COMPLETED | OUTPATIENT
Start: 2025-07-17 | End: 2025-07-17

## 2025-07-17 RX ADMIN — DEXAMETHASONE SODIUM PHOSPHATE 12 MG: 10 INJECTION, SOLUTION INTRAMUSCULAR; INTRAVENOUS at 09:55

## 2025-07-17 RX ADMIN — PACLITAXEL 135 MG: 100 INJECTION, POWDER, LYOPHILIZED, FOR SUSPENSION INTRAVENOUS at 10:47

## 2025-07-17 RX ADMIN — HEPARIN 500 UNITS: 100 SYRINGE at 12:18

## 2025-07-17 RX ADMIN — SODIUM CHLORIDE 20 ML/HR: 9 INJECTION, SOLUTION INTRAVENOUS at 09:55

## 2025-07-17 RX ADMIN — SODIUM CHLORIDE 1150 MG: 9 INJECTION, SOLUTION INTRAVENOUS at 11:41

## 2025-07-17 NOTE — PROGRESS NOTES
PROBLEM LIST:  Oncology/Hematology History   Malignant tumor of head of pancreas   10/2/2024 Cancer Staged    Staging form: Exocrine Pancreas, AJCC 8th Edition  - Clinical stage from 10/2/2024: Stage IV (cT2, cN0, cM1) - Signed by Ni Jackson MD on 10/4/2024     10/3/2024 Initial Diagnosis    Malignant tumor of head of pancreas     10/10/2024 -  Chemotherapy    OP PANCREATIC PACLitaxel Protein-Bound / Gemcitabine         REASON FOR VISIT: Pancreatic cancer    HISTORY OF PRESENT ILLNESS:   74 y.o.  female presents today for follow-up of her pancreatic cancer.  She is undergoing chemotherapy with Abraxane and gemcitabine.  Doing well clinically.  Still having considerable fatigue.  Overall no infections.  Pain seems relatively controlled.  She had scans done prior to this visit.    Past medical history, social history and family history was reviewed 07/17/25 and unchanged from prior visit.    Review of Systems:    Review of Systems   Constitutional:  Positive for fatigue.   HENT:  Negative.     Eyes: Negative.    Respiratory: Negative.     Cardiovascular: Negative.    Gastrointestinal: Negative.    Endocrine: Negative.    Genitourinary: Negative.     Musculoskeletal: Negative.    Skin: Negative.    Neurological: Negative.    Hematological: Negative.    Psychiatric/Behavioral: Negative.              Medications:        Current Outpatient Medications:     amLODIPine-benazepril (LOTREL 5-10) 5-10 MG per capsule, Take 1 capsule by mouth Daily., Disp: , Rfl:     apixaban (ELIQUIS) 5 MG tablet tablet, Take 1 tablet by mouth 2 (Two) Times a Day., Disp: 60 tablet, Rfl: 5    Apixaban Starter Pack tablet therapy pack, Take two 5 mg tablets by mouth every 12 hours for 7 days. Followed by one 5 mg tablet every 12 hours. (Dispense starter pack if available), Disp: 74 tablet, Rfl: 0    erythromycin (ROMYCIN) 5 MG/GM ophthalmic ointment, , Disp: , Rfl:     levothyroxine (SYNTHROID, LEVOTHROID) 50 MCG tablet, Take 1 tablet  "by mouth., Disp: , Rfl:     lidocaine-prilocaine (EMLA) 2.5-2.5 % cream, Apply 1 Application topically to the appropriate area as directed As Needed (45-60 minutes prior to port access.  Cover with saran/plastic wrap)., Disp: 30 g, Rfl: 2    megestrol (MEGACE) 40 MG/ML suspension, Take 20 mL by mouth Daily., Disp: 480 mL, Rfl: 1    naloxone (NARCAN) 4 MG/0.1ML nasal spray, Administer 1 spray into the nostril(s) as directed by provider As Needed for Opioid Reversal or Respiratory Depression., Disp: 1 each, Rfl: 0    ondansetron (ZOFRAN) 8 MG tablet, Take 1 tablet by mouth 3 (Three) Times a Day As Needed for Nausea or Vomiting., Disp: 30 tablet, Rfl: 5    ondansetron ODT (ZOFRAN-ODT) 8 MG disintegrating tablet, Place 1 tablet on the tongue Every 8 (Eight) Hours As Needed for Nausea or Vomiting., Disp: 30 tablet, Rfl: 5    oxyCODONE (ROXICODONE) 10 MG tablet, Take 1 tablet by mouth Every 6 (Six) Hours As Needed for Moderate Pain or Severe Pain for up to 30 days., Disp: 120 tablet, Rfl: 0    rosuvastatin (CRESTOR) 5 MG tablet, Take 1 tablet by mouth Daily., Disp: , Rfl:     apixaban (ELIQUIS) 5 MG tablet tablet, Take 1 tablet by mouth 2 (Two) Times a Day., Disp: 180 tablet, Rfl: 3    pancrelipase, Lip-Prot-Amyl, (Creon) 77412-38151 units capsule delayed-release particles capsule, Take 1 capsule by mouth 3 (Three) Times a Day With Meals., Disp: 90 capsule, Rfl: 5    Pain Medications              oxyCODONE (ROXICODONE) 10 MG tablet Take 1 tablet by mouth Every 6 (Six) Hours As Needed for Moderate Pain or Severe Pain for up to 30 days.               ALLERGIES:  No Known Allergies      Physical Exam    VITAL SIGNS:  /69 Comment: LUE  Pulse 85   Temp 97.1 °F (36.2 °C) (Infrared)   Resp 16   Ht 157.5 cm (62\")   Wt 46.3 kg (102 lb)   SpO2 100% Comment: RA  BMI 18.66 kg/m²     ECOG score: 1           Wt Readings from Last 3 Encounters:   07/17/25 46.3 kg (102 lb)   07/15/25 46.7 kg (103 lb)   07/03/25 45.8 kg (101 " lb)       Body mass index is 18.66 kg/m². Body surface area is 1.44 meters squared.       Performance Status: 1    General: well appearing, in no acute distress  HEENT: sclera anicteric, neck is supple  Lymphatics: no cervical, supraclavicular, or axillary adenopathy  Cardiovascular: regular rate and rhythm, no murmurs, rubs or gallops  Lungs: clear to auscultation bilaterally  Abdomen: soft, nontender, nondistended.  No palpable organomegaly  Extremities: no lower extremity edema  Skin: no rashes, lesions, bruising, or petechiae  Msk:  Shows no weakness of the large muscle groups  Psych: Mood is stable        RECENT LABS:    Lab Results   Component Value Date    HGB 10.5 (L) 07/15/2025    HCT 32.4 (L) 07/15/2025    MCV 93.6 07/15/2025     07/15/2025    WBC 8.18 07/15/2025    NEUTROABS 5.33 07/15/2025    LYMPHSABS 1.50 07/15/2025    MONOSABS 1.07 (H) 07/15/2025    EOSABS 0.19 07/15/2025    BASOSABS 0.05 07/15/2025       Lab Results   Component Value Date    GLUCOSE 106 (H) 07/15/2025    BUN 16.5 07/15/2025    CREATININE 0.61 07/15/2025     (L) 07/15/2025    K 4.2 07/15/2025     07/15/2025    CO2 21.0 (L) 07/15/2025    CALCIUM 8.7 07/15/2025    PROTEINTOT 6.3 07/15/2025    ALBUMIN 3.8 07/15/2025    BILITOT 0.3 07/15/2025    ALKPHOS 124 (H) 07/15/2025    AST 16 07/15/2025    ALT 13 07/15/2025     Lab Results   Component Value Date     523.0 (H) 07/01/2025     649.0 (H) 06/03/2025     625.0 (H) 05/06/2025     1,200.0 (H) 04/08/2025     931.0 (H) 03/25/2025     CT Abdomen Pelvis With & Without Contrast    Result Date: 2/26/2025  Impression: 1.No significant change in the pancreatic head neoplasm as above 2.Stable mild intra and extrahepatic biliary ductal dilatation to the level of the pancreatic head mass. 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 4.Slight  interval decrease in size of omental nodules. 5.No significant change in multiple hypodense liver lesions compatible with metastatic disease Electronically Signed: Harvey Alvarez MD  2/26/2025 9:50 AM EST  Workstation ID: [id number]    CT Abdomen Pelvis With & Without Contrast    Result Date: 12/30/2024  Impression: Primary pancreatic head neoplasm is grossly similar in size. Grossly similar innumerable hepatic metastases, omental nodularity, and mesenteric lymphadenopathy. Slight interval recanalization of the previously occluded portosplenomesenteric venous confluence. New small volume free fluid. Electronically Signed: Merrill Duncan MD  12/30/2024 6:17 PM EST  Workstation ID: [id number]        Assessment/Plan    1.  Stage IV adenocarcinoma of the pancreas.  I reviewed her scans.  I feel she is stable with no sign of progressive disease.  I am still awaiting final read.  I looked at the images with her and her .  Continue Abraxane and gemcitabine.  I have dose reduce her Abraxane and gemcitabine for the severe fatigue.  For now she is doing well.  I am going to scan her in 1 month.  If she does have progressive disease I will likely offer her modified FOLFIRINOX.   For now she is responding to treatment.      2.  Heartburn secondary to chemotherapy.  on PPI and baking soda with water.      3.  Constipation.  Improve constipation with MiraLAX twice a day along with stool softener.    4.  Chemotherapy-induced neuropathy.  Mostly numbness.  No significant pain for now.    5.  Pancreatic insufficiency.  I think she has got significant issues with digestion secondary to her pancreatic cancer.  I am going to place her on Creon to see if it helps.    Total time of patient care on day of service including time prior to, face to face with patient, and following visit spent in reviewing records, lab results, imaging studies, discussion with patient, and documentation/charting was >45 minutes.     Ni Jackson,  MD  Psychiatric Hematology and Oncology    Return on: 08/14/25  Return in (Approximately): 1 month, Schedule with next infusion    No orders of the defined types were placed in this encounter.      7/17/2025     Future Appointments         Provider Department Center    7/17/2025 9:30 AM CHAIR 19 [facility] OUTPATIENT ONCOLOGY TC    8/12/2025 11:30 AM ACCESS AND LAB DRAW CHAIR 1 YARI RAMIREZ [facility] OUTPATIENT ONCOLOGY [facility] NEAL    8/14/2025 11:30 AM (Arrive by 11:15 AM) Ni Jackson MD [facility] HEMATOLOGY & ONCOLOGY TC    8/14/2025 1:00 PM CHAIR 4 NEAL OP INFU YARI RAMIREZ [facility] OUTPATIENT ONCOLOGY [facility] NEAL    9/9/2025 1:00 PM (Arrive by 12:45 PM) Michelle Arroyo PA-C [facility] PALLIATIVE CARE TC

## 2025-07-17 NOTE — LETTER
July 17, 2025     No Recipients    Patient: Claudia Flores   YOB: 1950   Date of Visit: 7/17/2025     Dear No Recipients:       Thank you for referring Claudia Flores to me for evaluation. Below are the relevant portions of my assessment and plan of care.    If you have questions, please do not hesitate to call me. I look forward to following Claudia along with you.         Sincerely,        Ni Jackson MD        CC: No Recipients    Ni Jackson MD  07/17/25 0910  Sign when Signing Visit  PROBLEM LIST:  Oncology/Hematology History   Malignant tumor of head of pancreas   10/2/2024 Cancer Staged    Staging form: Exocrine Pancreas, AJCC 8th Edition  - Clinical stage from 10/2/2024: Stage IV (cT2, cN0, cM1) - Signed by Ni Jackson MD on 10/4/2024     10/3/2024 Initial Diagnosis    Malignant tumor of head of pancreas     10/10/2024 -  Chemotherapy    OP PANCREATIC PACLitaxel Protein-Bound / Gemcitabine         REASON FOR VISIT: Pancreatic cancer    HISTORY OF PRESENT ILLNESS:   74 y.o.  female presents today for follow-up of her pancreatic cancer.  She is undergoing chemotherapy with Abraxane and gemcitabine.  Doing well clinically.  Still having considerable fatigue.  Overall no infections.  Pain seems relatively controlled.  She had scans done prior to this visit.    Past medical history, social history and family history was reviewed 07/17/25 and unchanged from prior visit.    Review of Systems:    Review of Systems   Constitutional:  Positive for fatigue.   HENT:  Negative.     Eyes: Negative.    Respiratory: Negative.     Cardiovascular: Negative.    Gastrointestinal: Negative.    Endocrine: Negative.    Genitourinary: Negative.     Musculoskeletal: Negative.    Skin: Negative.    Neurological: Negative.    Hematological: Negative.    Psychiatric/Behavioral: Negative.              Medications:        Current Outpatient Medications:   •  amLODIPine-benazepril (LOTREL 5-10)  5-10 MG per capsule, Take 1 capsule by mouth Daily., Disp: , Rfl:   •  apixaban (ELIQUIS) 5 MG tablet tablet, Take 1 tablet by mouth 2 (Two) Times a Day., Disp: 60 tablet, Rfl: 5  •  Apixaban Starter Pack tablet therapy pack, Take two 5 mg tablets by mouth every 12 hours for 7 days. Followed by one 5 mg tablet every 12 hours. (Dispense starter pack if available), Disp: 74 tablet, Rfl: 0  •  erythromycin (ROMYCIN) 5 MG/GM ophthalmic ointment, , Disp: , Rfl:   •  levothyroxine (SYNTHROID, LEVOTHROID) 50 MCG tablet, Take 1 tablet by mouth., Disp: , Rfl:   •  lidocaine-prilocaine (EMLA) 2.5-2.5 % cream, Apply 1 Application topically to the appropriate area as directed As Needed (45-60 minutes prior to port access.  Cover with saran/plastic wrap)., Disp: 30 g, Rfl: 2  •  megestrol (MEGACE) 40 MG/ML suspension, Take 20 mL by mouth Daily., Disp: 480 mL, Rfl: 1  •  naloxone (NARCAN) 4 MG/0.1ML nasal spray, Administer 1 spray into the nostril(s) as directed by provider As Needed for Opioid Reversal or Respiratory Depression., Disp: 1 each, Rfl: 0  •  ondansetron (ZOFRAN) 8 MG tablet, Take 1 tablet by mouth 3 (Three) Times a Day As Needed for Nausea or Vomiting., Disp: 30 tablet, Rfl: 5  •  ondansetron ODT (ZOFRAN-ODT) 8 MG disintegrating tablet, Place 1 tablet on the tongue Every 8 (Eight) Hours As Needed for Nausea or Vomiting., Disp: 30 tablet, Rfl: 5  •  oxyCODONE (ROXICODONE) 10 MG tablet, Take 1 tablet by mouth Every 6 (Six) Hours As Needed for Moderate Pain or Severe Pain for up to 30 days., Disp: 120 tablet, Rfl: 0  •  rosuvastatin (CRESTOR) 5 MG tablet, Take 1 tablet by mouth Daily., Disp: , Rfl:   •  apixaban (ELIQUIS) 5 MG tablet tablet, Take 1 tablet by mouth 2 (Two) Times a Day., Disp: 180 tablet, Rfl: 3  •  pancrelipase, Lip-Prot-Amyl, (Creon) 23545-66435 units capsule delayed-release particles capsule, Take 1 capsule by mouth 3 (Three) Times a Day With Meals., Disp: 90 capsule, Rfl: 5    Pain Medications         "      oxyCODONE (ROXICODONE) 10 MG tablet Take 1 tablet by mouth Every 6 (Six) Hours As Needed for Moderate Pain or Severe Pain for up to 30 days.               ALLERGIES:  No Known Allergies      Physical Exam    VITAL SIGNS:  /69 Comment: LUE  Pulse 85   Temp 97.1 °F (36.2 °C) (Infrared)   Resp 16   Ht 157.5 cm (62\")   Wt 46.3 kg (102 lb)   SpO2 100% Comment: RA  BMI 18.66 kg/m²     ECOG score: 1           Wt Readings from Last 3 Encounters:   07/17/25 46.3 kg (102 lb)   07/15/25 46.7 kg (103 lb)   07/03/25 45.8 kg (101 lb)       Body mass index is 18.66 kg/m². Body surface area is 1.44 meters squared.       Performance Status: 1    General: well appearing, in no acute distress  HEENT: sclera anicteric, neck is supple  Lymphatics: no cervical, supraclavicular, or axillary adenopathy  Cardiovascular: regular rate and rhythm, no murmurs, rubs or gallops  Lungs: clear to auscultation bilaterally  Abdomen: soft, nontender, nondistended.  No palpable organomegaly  Extremities: no lower extremity edema  Skin: no rashes, lesions, bruising, or petechiae  Msk:  Shows no weakness of the large muscle groups  Psych: Mood is stable        RECENT LABS:    Lab Results   Component Value Date    HGB 10.5 (L) 07/15/2025    HCT 32.4 (L) 07/15/2025    MCV 93.6 07/15/2025     07/15/2025    WBC 8.18 07/15/2025    NEUTROABS 5.33 07/15/2025    LYMPHSABS 1.50 07/15/2025    MONOSABS 1.07 (H) 07/15/2025    EOSABS 0.19 07/15/2025    BASOSABS 0.05 07/15/2025       Lab Results   Component Value Date    GLUCOSE 106 (H) 07/15/2025    BUN 16.5 07/15/2025    CREATININE 0.61 07/15/2025     (L) 07/15/2025    K 4.2 07/15/2025     07/15/2025    CO2 21.0 (L) 07/15/2025    CALCIUM 8.7 07/15/2025    PROTEINTOT 6.3 07/15/2025    ALBUMIN 3.8 07/15/2025    BILITOT 0.3 07/15/2025    ALKPHOS 124 (H) 07/15/2025    AST 16 07/15/2025    ALT 13 07/15/2025     Lab Results   Component Value Date     523.0 (H) 07/01/2025     " 649.0 (H) 06/03/2025     625.0 (H) 05/06/2025     1,200.0 (H) 04/08/2025     931.0 (H) 03/25/2025     CT Abdomen Pelvis With & Without Contrast    Result Date: 2/26/2025  Impression: 1.No significant change in the pancreatic head neoplasm as above 2.Stable mild intra and extrahepatic biliary ductal dilatation to the level of the pancreatic head mass. 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 4.Slight interval decrease in size of omental nodules. 5.No significant change in multiple hypodense liver lesions compatible with metastatic disease Electronically Signed: Harvey Alvarez MD  2/26/2025 9:50 AM EST  Workstation ID: [id number]    CT Abdomen Pelvis With & Without Contrast    Result Date: 12/30/2024  Impression: Primary pancreatic head neoplasm is grossly similar in size. Grossly similar innumerable hepatic metastases, omental nodularity, and mesenteric lymphadenopathy. Slight interval recanalization of the previously occluded portosplenomesenteric venous confluence. New small volume free fluid. Electronically Signed: Merrill Duncan MD  12/30/2024 6:17 PM EST  Workstation ID: [id number]        Assessment/Plan    1.  Stage IV adenocarcinoma of the pancreas.  I reviewed her scans.  I feel she is stable with no sign of progressive disease.  I am still awaiting final read.  I looked at the images with her and her .  Continue Abraxane and gemcitabine.  I have dose reduce her Abraxane and gemcitabine for the severe fatigue.  For now she is doing well.  I am going to scan her in 1 month.  If she does have progressive disease I will likely offer her modified FOLFIRINOX.   For now she is responding to treatment.      2.  Heartburn secondary to chemotherapy.  on PPI and baking soda with water.      3.  Constipation.  Improve constipation with MiraLAX twice a day along with stool  softener.    4.  Chemotherapy-induced neuropathy.  Mostly numbness.  No significant pain for now.    5.  Pancreatic insufficiency.  I think she has got significant issues with digestion secondary to her pancreatic cancer.  I am going to place her on Creon to see if it helps.    Total time of patient care on day of service including time prior to, face to face with patient, and following visit spent in reviewing records, lab results, imaging studies, discussion with patient, and documentation/charting was >45 minutes.     Ni Jackson MD  [facility] Hematology and Oncology    Return on: 08/14/25  Return in (Approximately): 1 month, Schedule with next infusion    No orders of the defined types were placed in this encounter.      7/17/2025     Future Appointments         Provider Department Center    7/17/2025 9:30 AM CHAIR 19 [facility] OUTPATIENT ONCOLOGY TC    8/12/2025 11:30 AM ACCESS AND LAB DRAW CHAIR 1 YARI [facility] OUTPATIENT ONCOLOGY [facility] NEAL    8/14/2025 11:30 AM (Arrive by 11:15 AM) Ni Jackson MD [facility] HEMATOLOGY & ONCOLOGY TC    8/14/2025 1:00 PM CHAIR 4 NEAL OP INFU YARI [facility] OUTPATIENT ONCOLOGY [facility] NEAL    9/9/2025 1:00 PM (Arrive by 12:45 PM) Michelle Arroyo PA-C [facility] PALLIATIVE CARE TC

## 2025-07-21 ENCOUNTER — TELEPHONE (OUTPATIENT)
Dept: ONCOLOGY | Facility: CLINIC | Age: 75
End: 2025-07-21
Payer: MEDICARE

## 2025-07-21 DIAGNOSIS — C25.0 MALIGNANT TUMOR OF HEAD OF PANCREAS: ICD-10-CM

## 2025-07-21 DIAGNOSIS — K86.89 PANCREATIC INSUFFICIENCY: ICD-10-CM

## 2025-07-21 NOTE — TELEPHONE ENCOUNTER
Caller: Mark Claudia H    Relationship: Self    Best call back number: [phone]     Requested Prescriptions:   Requested Prescriptions     Pending Prescriptions Disp Refills    pancrelipase, Lip-Prot-Amyl, (Creon) [id number] units capsule delayed-release particles capsule 90 capsule 5     Sig: Take 1 capsule by mouth 3 (Three) Times a Day With Meals.        Pharmacy where request should be sent: [facility] [address] [phone] [place] [phone]      Last office visit with prescribing clinician: 7/17/2025   Last telemedicine visit with prescribing clinician: Visit date not found   Next office visit with prescribing clinician: 8/14/2025     Additional details provided by patient: PATIENT IS NEEDING TO REQUEST A 90 DAY SUPPLY IN ORDER FOR RX TO BE DELIVERED VIA [facility]. CURRENT RX IS ONLY A 30 DAY SUPPLY    Does the patient have less than a 3 day supply:  [x] Yes  [] No    Would you like a call back once the refill request has been completed: [] Yes [x] No    If the office needs to give you a call back, can they leave a voicemail: [] Yes [x] No    Tamanna Carlos Rep   07/21/25 09:30 EDT

## 2025-07-29 ENCOUNTER — HOSPITAL ENCOUNTER (OUTPATIENT)
Dept: ONCOLOGY | Facility: HOSPITAL | Age: 75
Discharge: HOME | End: 2025-07-29
Admitting: INTERNAL MEDICINE
Payer: MEDICARE

## 2025-07-29 VITALS
HEART RATE: 87 BPM | TEMPERATURE: 97.4 F | SYSTOLIC BLOOD PRESSURE: 138 MMHG | RESPIRATION RATE: 16 BRPM | DIASTOLIC BLOOD PRESSURE: 65 MMHG | BODY MASS INDEX: 19.27 KG/M2 | WEIGHT: 104.7 LBS | HEIGHT: 62 IN

## 2025-07-29 DIAGNOSIS — Z45.2 ENCOUNTER FOR CARE RELATED TO PORT-A-CATH: Primary | ICD-10-CM

## 2025-07-29 DIAGNOSIS — C25.0 MALIGNANT TUMOR OF HEAD OF PANCREAS: ICD-10-CM

## 2025-07-29 LAB
ALBUMIN SERPL-MCNC: 3.7 G/DL (ref 3.5–5.2)
ALBUMIN/GLOB SERPL: 1.5 G/DL
ALP SERPL-CCNC: 130 U/L (ref 39–117)
ALT SERPL W P-5'-P-CCNC: 19 U/L (ref 1–33)
ANION GAP SERPL CALCULATED.3IONS-SCNC: 8.7 MMOL/L (ref 5–15)
AST SERPL-CCNC: 19 U/L (ref 1–32)
BASOPHILS # BLD AUTO: 0.04 10*3/MM3 (ref 0–0.2)
BASOPHILS NFR BLD AUTO: 0.8 % (ref 0–1.5)
BILIRUB SERPL-MCNC: 0.3 MG/DL (ref 0–1.2)
BUN SERPL-MCNC: 11.3 MG/DL (ref 8–23)
BUN/CREAT SERPL: 19.8 (ref 7–25)
CALCIUM SPEC-SCNC: 8.8 MG/DL (ref 8.6–10.5)
CANCER AG19-9 SERPL-ACNC: 551 U/ML
CHLORIDE SERPL-SCNC: 105 MMOL/L (ref 98–107)
CO2 SERPL-SCNC: 21.3 MMOL/L (ref 22–29)
CREAT SERPL-MCNC: 0.57 MG/DL (ref 0.57–1)
DEPRECATED RDW RBC AUTO: 62.5 FL (ref 37–54)
EGFRCR SERPLBLD CKD-EPI 2021: 94.3 ML/MIN/1.73
EOSINOPHIL # BLD AUTO: 0.24 10*3/MM3 (ref 0–0.4)
EOSINOPHIL NFR BLD AUTO: 4.9 % (ref 0.3–6.2)
ERYTHROCYTE [DISTWIDTH] IN BLOOD BY AUTOMATED COUNT: 17.8 % (ref 12.3–15.4)
GLOBULIN UR ELPH-MCNC: 2.4 GM/DL
GLUCOSE SERPL-MCNC: 132 MG/DL (ref 65–99)
HCT VFR BLD AUTO: 30.7 % (ref 34–46.6)
HGB BLD-MCNC: 9.9 G/DL (ref 12–15.9)
IMM GRANULOCYTES # BLD AUTO: 0.01 10*3/MM3 (ref 0–0.05)
IMM GRANULOCYTES NFR BLD AUTO: 0.2 % (ref 0–0.5)
LYMPHOCYTES # BLD AUTO: 1.24 10*3/MM3 (ref 0.7–3.1)
LYMPHOCYTES NFR BLD AUTO: 25.2 % (ref 19.6–45.3)
MCH RBC QN AUTO: 30.7 PG (ref 26.6–33)
MCHC RBC AUTO-ENTMCNC: 32.2 G/DL (ref 31.5–35.7)
MCV RBC AUTO: 95 FL (ref 79–97)
MONOCYTES # BLD AUTO: 0.68 10*3/MM3 (ref 0.1–0.9)
MONOCYTES NFR BLD AUTO: 13.8 % (ref 5–12)
NEUTROPHILS NFR BLD AUTO: 2.71 10*3/MM3 (ref 1.7–7)
NEUTROPHILS NFR BLD AUTO: 55.1 % (ref 42.7–76)
PLATELET # BLD AUTO: 171 10*3/MM3 (ref 140–450)
PMV BLD AUTO: 10.1 FL (ref 6–12)
POTASSIUM SERPL-SCNC: 4.3 MMOL/L (ref 3.5–5.2)
PROT SERPL-MCNC: 6.1 G/DL (ref 6–8.5)
RBC # BLD AUTO: 3.23 10*6/MM3 (ref 3.77–5.28)
SODIUM SERPL-SCNC: 135 MMOL/L (ref 136–145)
WBC NRBC COR # BLD AUTO: 4.92 10*3/MM3 (ref 3.4–10.8)

## 2025-07-29 PROCEDURE — 85025 COMPLETE CBC W/AUTO DIFF WBC: CPT | Performed by: INTERNAL MEDICINE

## 2025-07-29 PROCEDURE — 36591 DRAW BLOOD OFF VENOUS DEVICE: CPT

## 2025-07-29 PROCEDURE — 80053 COMPREHEN METABOLIC PANEL: CPT | Performed by: INTERNAL MEDICINE

## 2025-07-29 PROCEDURE — 86301 IMMUNOASSAY TUMOR CA 19-9: CPT | Performed by: INTERNAL MEDICINE

## 2025-07-29 PROCEDURE — 25010000002 HEPARIN LOCK FLUSH PER 10 UNITS: Performed by: INTERNAL MEDICINE

## 2025-07-29 RX ORDER — SODIUM CHLORIDE 0.9 % (FLUSH) 0.9 %
10 SYRINGE (ML) INJECTION AS NEEDED
Status: DISCONTINUED | OUTPATIENT
Start: 2025-07-29 | End: 2025-07-30 | Stop reason: HOSPADM

## 2025-07-29 RX ORDER — HEPARIN SODIUM (PORCINE) LOCK FLUSH IV SOLN 100 UNIT/ML 100 UNIT/ML
500 SOLUTION INTRAVENOUS AS NEEDED
Status: DISCONTINUED | OUTPATIENT
Start: 2025-07-29 | End: 2025-07-30 | Stop reason: HOSPADM

## 2025-07-29 RX ORDER — HEPARIN SODIUM (PORCINE) LOCK FLUSH IV SOLN 100 UNIT/ML 100 UNIT/ML
500 SOLUTION INTRAVENOUS AS NEEDED
Status: CANCELLED | OUTPATIENT
Start: 2025-07-29

## 2025-07-29 RX ORDER — SODIUM CHLORIDE 0.9 % (FLUSH) 0.9 %
20 SYRINGE (ML) INJECTION AS NEEDED
Status: CANCELLED | OUTPATIENT
Start: 2025-07-29

## 2025-07-29 RX ORDER — SODIUM CHLORIDE 0.9 % (FLUSH) 0.9 %
10 SYRINGE (ML) INJECTION AS NEEDED
Status: CANCELLED | OUTPATIENT
Start: 2025-07-29

## 2025-07-29 RX ADMIN — Medication 10 ML: at 11:27

## 2025-07-29 RX ADMIN — HEPARIN 500 UNITS: 100 SYRINGE at 11:27

## 2025-07-31 ENCOUNTER — HOSPITAL ENCOUNTER (OUTPATIENT)
Dept: ONCOLOGY | Facility: HOSPITAL | Age: 75
Discharge: HOME | End: 2025-07-31
Admitting: INTERNAL MEDICINE
Payer: MEDICARE

## 2025-07-31 VITALS
WEIGHT: 105 LBS | BODY MASS INDEX: 19.32 KG/M2 | RESPIRATION RATE: 16 BRPM | TEMPERATURE: 97.7 F | SYSTOLIC BLOOD PRESSURE: 118 MMHG | HEART RATE: 92 BPM | DIASTOLIC BLOOD PRESSURE: 60 MMHG | HEIGHT: 62 IN

## 2025-07-31 DIAGNOSIS — Z45.2 ENCOUNTER FOR CARE RELATED TO PORT-A-CATH: ICD-10-CM

## 2025-07-31 DIAGNOSIS — C25.0 MALIGNANT TUMOR OF HEAD OF PANCREAS: Primary | ICD-10-CM

## 2025-07-31 PROCEDURE — 25010000002 HEPARIN LOCK FLUSH PER 10 UNITS: Performed by: INTERNAL MEDICINE

## 2025-07-31 PROCEDURE — 25010000002 PACLITAXEL PROTEIN-BOUND PART PER 1 MG: Performed by: INTERNAL MEDICINE

## 2025-07-31 PROCEDURE — 96375 TX/PRO/DX INJ NEW DRUG ADDON: CPT

## 2025-07-31 PROCEDURE — 25010000002 GEMCITABINE 1 GM/26.3ML SOLUTION 26.3 ML VIAL: Performed by: INTERNAL MEDICINE

## 2025-07-31 PROCEDURE — 96417 CHEMO IV INFUS EACH ADDL SEQ: CPT

## 2025-07-31 PROCEDURE — 96413 CHEMO IV INFUSION 1 HR: CPT

## 2025-07-31 PROCEDURE — 25010000002 GEMCITABINE 200 MG/5.26ML SOLUTION 5.26 ML VIAL: Performed by: INTERNAL MEDICINE

## 2025-07-31 PROCEDURE — 25810000003 SODIUM CHLORIDE 0.9 % SOLUTION: Performed by: INTERNAL MEDICINE

## 2025-07-31 PROCEDURE — 25810000003 SODIUM CHLORIDE 0.9 % SOLUTION 250 ML FLEX CONT: Performed by: INTERNAL MEDICINE

## 2025-07-31 PROCEDURE — 25010000002 DEXAMETHASONE SODIUM PHOSPHATE 100 MG/10ML SOLUTION: Performed by: INTERNAL MEDICINE

## 2025-07-31 RX ORDER — HEPARIN SODIUM (PORCINE) LOCK FLUSH IV SOLN 100 UNIT/ML 100 UNIT/ML
500 SOLUTION INTRAVENOUS AS NEEDED
Status: DISCONTINUED | OUTPATIENT
Start: 2025-07-31 | End: 2025-08-01 | Stop reason: HOSPADM

## 2025-07-31 RX ORDER — SODIUM CHLORIDE 9 MG/ML
20 INJECTION, SOLUTION INTRAVENOUS ONCE
Status: COMPLETED | OUTPATIENT
Start: 2025-07-31 | End: 2025-07-31

## 2025-07-31 RX ORDER — SODIUM CHLORIDE 0.9 % (FLUSH) 0.9 %
20 SYRINGE (ML) INJECTION AS NEEDED
OUTPATIENT
Start: 2025-07-31

## 2025-07-31 RX ORDER — PACLITAXEL 100 MG/20ML
90 INJECTION, POWDER, LYOPHILIZED, FOR SUSPENSION INTRAVENOUS ONCE
Status: COMPLETED | OUTPATIENT
Start: 2025-07-31 | End: 2025-07-31

## 2025-07-31 RX ORDER — HEPARIN SODIUM (PORCINE) LOCK FLUSH IV SOLN 100 UNIT/ML 100 UNIT/ML
500 SOLUTION INTRAVENOUS AS NEEDED
OUTPATIENT
Start: 2025-07-31

## 2025-07-31 RX ORDER — SODIUM CHLORIDE 0.9 % (FLUSH) 0.9 %
10 SYRINGE (ML) INJECTION AS NEEDED
OUTPATIENT
Start: 2025-07-31

## 2025-07-31 RX ADMIN — DEXAMETHASONE SODIUM PHOSPHATE 12 MG: 10 INJECTION, SOLUTION INTRAMUSCULAR; INTRAVENOUS at 10:54

## 2025-07-31 RX ADMIN — HEPARIN 500 UNITS: 100 SYRINGE at 13:33

## 2025-07-31 RX ADMIN — SODIUM CHLORIDE 20 ML/HR: 9 INJECTION, SOLUTION INTRAVENOUS at 10:53

## 2025-07-31 RX ADMIN — SODIUM CHLORIDE 1150 MG: 9 INJECTION, SOLUTION INTRAVENOUS at 12:57

## 2025-07-31 RX ADMIN — PACLITAXEL 135 MG: 100 INJECTION, POWDER, LYOPHILIZED, FOR SUSPENSION INTRAVENOUS at 11:42

## 2025-08-05 DIAGNOSIS — C25.0 MALIGNANT TUMOR OF HEAD OF PANCREAS: Primary | ICD-10-CM

## 2025-08-12 ENCOUNTER — HOSPITAL ENCOUNTER (OUTPATIENT)
Dept: ONCOLOGY | Facility: HOSPITAL | Age: 75
Discharge: HOME | End: 2025-08-12
Admitting: INTERNAL MEDICINE
Payer: MEDICARE

## 2025-08-12 DIAGNOSIS — Z45.2 ENCOUNTER FOR CARE RELATED TO PORT-A-CATH: Primary | ICD-10-CM

## 2025-08-12 DIAGNOSIS — C25.0 MALIGNANT TUMOR OF HEAD OF PANCREAS: ICD-10-CM

## 2025-08-12 LAB
ALBUMIN SERPL-MCNC: 3.8 G/DL (ref 3.5–5.2)
ALBUMIN/GLOB SERPL: 1.7 G/DL
ALP SERPL-CCNC: 158 U/L (ref 39–117)
ALT SERPL W P-5'-P-CCNC: 13 U/L (ref 1–33)
ANION GAP SERPL CALCULATED.3IONS-SCNC: 8 MMOL/L (ref 5–15)
AST SERPL-CCNC: 21 U/L (ref 1–32)
BASOPHILS # BLD AUTO: 0.06 10*3/MM3 (ref 0–0.2)
BASOPHILS NFR BLD AUTO: 1.1 % (ref 0–1.5)
BILIRUB SERPL-MCNC: 0.3 MG/DL (ref 0–1.2)
BUN SERPL-MCNC: 11.7 MG/DL (ref 8–23)
BUN/CREAT SERPL: 21.7 (ref 7–25)
CALCIUM SPEC-SCNC: 8.6 MG/DL (ref 8.6–10.5)
CHLORIDE SERPL-SCNC: 103 MMOL/L (ref 98–107)
CO2 SERPL-SCNC: 24 MMOL/L (ref 22–29)
CREAT SERPL-MCNC: 0.54 MG/DL (ref 0.57–1)
DEPRECATED RDW RBC AUTO: 60.4 FL (ref 37–54)
EGFRCR SERPLBLD CKD-EPI 2021: 95.6 ML/MIN/1.73
EOSINOPHIL # BLD AUTO: 0.19 10*3/MM3 (ref 0–0.4)
EOSINOPHIL NFR BLD AUTO: 3.4 % (ref 0.3–6.2)
ERYTHROCYTE [DISTWIDTH] IN BLOOD BY AUTOMATED COUNT: 17.1 % (ref 12.3–15.4)
GLOBULIN UR ELPH-MCNC: 2.3 GM/DL
GLUCOSE SERPL-MCNC: 154 MG/DL (ref 65–99)
HCT VFR BLD AUTO: 32.1 % (ref 34–46.6)
HGB BLD-MCNC: 10.2 G/DL (ref 12–15.9)
IMM GRANULOCYTES # BLD AUTO: 0.02 10*3/MM3 (ref 0–0.05)
IMM GRANULOCYTES NFR BLD AUTO: 0.4 % (ref 0–0.5)
LYMPHOCYTES # BLD AUTO: 1.38 10*3/MM3 (ref 0.7–3.1)
LYMPHOCYTES NFR BLD AUTO: 24.6 % (ref 19.6–45.3)
MCH RBC QN AUTO: 30.4 PG (ref 26.6–33)
MCHC RBC AUTO-ENTMCNC: 31.8 G/DL (ref 31.5–35.7)
MCV RBC AUTO: 95.5 FL (ref 79–97)
MONOCYTES # BLD AUTO: 0.81 10*3/MM3 (ref 0.1–0.9)
MONOCYTES NFR BLD AUTO: 14.4 % (ref 5–12)
NEUTROPHILS NFR BLD AUTO: 3.15 10*3/MM3 (ref 1.7–7)
NEUTROPHILS NFR BLD AUTO: 56.1 % (ref 42.7–76)
PLATELET # BLD AUTO: 177 10*3/MM3 (ref 140–450)
PMV BLD AUTO: 10.3 FL (ref 6–12)
POTASSIUM SERPL-SCNC: 4.3 MMOL/L (ref 3.5–5.2)
PROT SERPL-MCNC: 6.1 G/DL (ref 6–8.5)
RBC # BLD AUTO: 3.36 10*6/MM3 (ref 3.77–5.28)
SODIUM SERPL-SCNC: 135 MMOL/L (ref 136–145)
WBC NRBC COR # BLD AUTO: 5.61 10*3/MM3 (ref 3.4–10.8)

## 2025-08-12 PROCEDURE — 25010000002 HEPARIN LOCK FLUSH PER 10 UNITS: Performed by: INTERNAL MEDICINE

## 2025-08-12 PROCEDURE — 80053 COMPREHEN METABOLIC PANEL: CPT | Performed by: INTERNAL MEDICINE

## 2025-08-12 PROCEDURE — 36591 DRAW BLOOD OFF VENOUS DEVICE: CPT

## 2025-08-12 PROCEDURE — 85025 COMPLETE CBC W/AUTO DIFF WBC: CPT | Performed by: INTERNAL MEDICINE

## 2025-08-12 RX ORDER — SODIUM CHLORIDE 0.9 % (FLUSH) 0.9 %
10 SYRINGE (ML) INJECTION AS NEEDED
Status: DISCONTINUED | OUTPATIENT
Start: 2025-08-12 | End: 2025-08-13 | Stop reason: HOSPADM

## 2025-08-12 RX ORDER — HEPARIN SODIUM (PORCINE) LOCK FLUSH IV SOLN 100 UNIT/ML 100 UNIT/ML
500 SOLUTION INTRAVENOUS AS NEEDED
Status: CANCELLED | OUTPATIENT
Start: 2025-08-12

## 2025-08-12 RX ORDER — SODIUM CHLORIDE 0.9 % (FLUSH) 0.9 %
20 SYRINGE (ML) INJECTION AS NEEDED
Status: CANCELLED | OUTPATIENT
Start: 2025-08-12

## 2025-08-12 RX ORDER — HEPARIN SODIUM (PORCINE) LOCK FLUSH IV SOLN 100 UNIT/ML 100 UNIT/ML
500 SOLUTION INTRAVENOUS AS NEEDED
Status: DISCONTINUED | OUTPATIENT
Start: 2025-08-12 | End: 2025-08-13 | Stop reason: HOSPADM

## 2025-08-12 RX ORDER — SODIUM CHLORIDE 0.9 % (FLUSH) 0.9 %
10 SYRINGE (ML) INJECTION AS NEEDED
Status: CANCELLED | OUTPATIENT
Start: 2025-08-12

## 2025-08-12 RX ADMIN — Medication 10 ML: at 11:36

## 2025-08-12 RX ADMIN — Medication 500 UNITS: at 11:36

## 2025-08-13 ENCOUNTER — TELEPHONE (OUTPATIENT)
Dept: PALLIATIVE CARE | Facility: CLINIC | Age: 75
End: 2025-08-13
Payer: MEDICARE

## 2025-08-13 DIAGNOSIS — G89.3 CANCER RELATED PAIN: Primary | ICD-10-CM

## 2025-08-14 ENCOUNTER — OFFICE VISIT (OUTPATIENT)
Dept: ONCOLOGY | Facility: CLINIC | Age: 75
End: 2025-08-14
Payer: MEDICARE

## 2025-08-14 ENCOUNTER — HOSPITAL ENCOUNTER (OUTPATIENT)
Dept: ONCOLOGY | Facility: HOSPITAL | Age: 75
Discharge: HOME | End: 2025-08-14
Admitting: INTERNAL MEDICINE
Payer: MEDICARE

## 2025-08-14 VITALS
HEART RATE: 86 BPM | HEIGHT: 62 IN | SYSTOLIC BLOOD PRESSURE: 109 MMHG | OXYGEN SATURATION: 100 % | DIASTOLIC BLOOD PRESSURE: 55 MMHG | BODY MASS INDEX: 19.32 KG/M2 | RESPIRATION RATE: 16 BRPM | TEMPERATURE: 97.2 F | WEIGHT: 105 LBS

## 2025-08-14 DIAGNOSIS — C25.0 MALIGNANT TUMOR OF HEAD OF PANCREAS: Primary | ICD-10-CM

## 2025-08-14 DIAGNOSIS — Z45.2 ENCOUNTER FOR CARE RELATED TO PORT-A-CATH: ICD-10-CM

## 2025-08-14 PROCEDURE — 96413 CHEMO IV INFUSION 1 HR: CPT

## 2025-08-14 PROCEDURE — 25010000002 HEPARIN LOCK FLUSH PER 10 UNITS: Performed by: INTERNAL MEDICINE

## 2025-08-14 PROCEDURE — 25810000003 SODIUM CHLORIDE 0.9 % SOLUTION: Performed by: INTERNAL MEDICINE

## 2025-08-14 PROCEDURE — 25010000002 GEMCITABINE 200 MG/5.26ML SOLUTION 5.26 ML VIAL: Performed by: INTERNAL MEDICINE

## 2025-08-14 PROCEDURE — 25010000002 GEMCITABINE 1 GM/26.3ML SOLUTION 26.3 ML VIAL: Performed by: INTERNAL MEDICINE

## 2025-08-14 PROCEDURE — 99214 OFFICE O/P EST MOD 30 MIN: CPT | Performed by: INTERNAL MEDICINE

## 2025-08-14 PROCEDURE — 25810000003 SODIUM CHLORIDE 0.9 % SOLUTION 250 ML FLEX CONT: Performed by: INTERNAL MEDICINE

## 2025-08-14 PROCEDURE — 25010000002 PACLITAXEL PROTEIN-BOUND PART PER 1 MG: Performed by: INTERNAL MEDICINE

## 2025-08-14 PROCEDURE — 25010000002 DEXAMETHASONE SODIUM PHOSPHATE 100 MG/10ML SOLUTION: Performed by: INTERNAL MEDICINE

## 2025-08-14 PROCEDURE — 96375 TX/PRO/DX INJ NEW DRUG ADDON: CPT

## 2025-08-14 PROCEDURE — 96417 CHEMO IV INFUS EACH ADDL SEQ: CPT

## 2025-08-14 PROCEDURE — 1126F AMNT PAIN NOTED NONE PRSNT: CPT | Performed by: INTERNAL MEDICINE

## 2025-08-14 RX ORDER — OXYCODONE HYDROCHLORIDE 10 MG/1
10 TABLET ORAL EVERY 6 HOURS PRN
Qty: 120 TABLET | Refills: 0 | Status: SHIPPED | OUTPATIENT
Start: 2025-08-14

## 2025-08-14 RX ORDER — PACLITAXEL 100 MG/20ML
90 INJECTION, POWDER, LYOPHILIZED, FOR SUSPENSION INTRAVENOUS ONCE
OUTPATIENT
Start: 2025-10-02

## 2025-08-14 RX ORDER — SODIUM CHLORIDE 9 MG/ML
20 INJECTION, SOLUTION INTRAVENOUS ONCE
OUTPATIENT
Start: 2025-10-16

## 2025-08-14 RX ORDER — SODIUM CHLORIDE 9 MG/ML
20 INJECTION, SOLUTION INTRAVENOUS ONCE
OUTPATIENT
Start: 2025-08-28

## 2025-08-14 RX ORDER — PACLITAXEL 100 MG/20ML
90 INJECTION, POWDER, LYOPHILIZED, FOR SUSPENSION INTRAVENOUS ONCE
OUTPATIENT
Start: 2025-09-18

## 2025-08-14 RX ORDER — HEPARIN SODIUM (PORCINE) LOCK FLUSH IV SOLN 100 UNIT/ML 100 UNIT/ML
500 SOLUTION INTRAVENOUS AS NEEDED
Status: DISCONTINUED | OUTPATIENT
Start: 2025-08-14 | End: 2025-08-15 | Stop reason: HOSPADM

## 2025-08-14 RX ORDER — SODIUM CHLORIDE 9 MG/ML
20 INJECTION, SOLUTION INTRAVENOUS ONCE
Status: COMPLETED | OUTPATIENT
Start: 2025-08-14 | End: 2025-08-14

## 2025-08-14 RX ORDER — PACLITAXEL 100 MG/20ML
90 INJECTION, POWDER, LYOPHILIZED, FOR SUSPENSION INTRAVENOUS ONCE
Status: COMPLETED | OUTPATIENT
Start: 2025-08-14 | End: 2025-08-14

## 2025-08-14 RX ORDER — SODIUM CHLORIDE 0.9 % (FLUSH) 0.9 %
10 SYRINGE (ML) INJECTION AS NEEDED
OUTPATIENT
Start: 2025-08-14

## 2025-08-14 RX ORDER — HEPARIN SODIUM (PORCINE) LOCK FLUSH IV SOLN 100 UNIT/ML 100 UNIT/ML
500 SOLUTION INTRAVENOUS AS NEEDED
OUTPATIENT
Start: 2025-08-14

## 2025-08-14 RX ORDER — PACLITAXEL 100 MG/20ML
90 INJECTION, POWDER, LYOPHILIZED, FOR SUSPENSION INTRAVENOUS ONCE
OUTPATIENT
Start: 2025-10-16

## 2025-08-14 RX ORDER — SODIUM CHLORIDE 9 MG/ML
20 INJECTION, SOLUTION INTRAVENOUS ONCE
OUTPATIENT
Start: 2025-10-02

## 2025-08-14 RX ORDER — SODIUM CHLORIDE 9 MG/ML
20 INJECTION, SOLUTION INTRAVENOUS ONCE
OUTPATIENT
Start: 2025-09-18

## 2025-08-14 RX ORDER — SODIUM CHLORIDE 0.9 % (FLUSH) 0.9 %
20 SYRINGE (ML) INJECTION AS NEEDED
OUTPATIENT
Start: 2025-08-14

## 2025-08-14 RX ORDER — PACLITAXEL 100 MG/20ML
90 INJECTION, POWDER, LYOPHILIZED, FOR SUSPENSION INTRAVENOUS ONCE
OUTPATIENT
Start: 2025-08-28

## 2025-08-14 RX ADMIN — PACLITAXEL 135 MG: 100 INJECTION, POWDER, LYOPHILIZED, FOR SUSPENSION INTRAVENOUS at 14:28

## 2025-08-14 RX ADMIN — SODIUM CHLORIDE 20 ML/HR: 9 INJECTION, SOLUTION INTRAVENOUS at 13:34

## 2025-08-14 RX ADMIN — Medication 500 UNITS: at 16:05

## 2025-08-14 RX ADMIN — SODIUM CHLORIDE 1150 MG: 9 INJECTION, SOLUTION INTRAVENOUS at 15:25

## 2025-08-14 RX ADMIN — DEXAMETHASONE SODIUM PHOSPHATE 12 MG: 10 INJECTION, SOLUTION INTRAMUSCULAR; INTRAVENOUS at 13:34

## 2025-08-26 ENCOUNTER — HOSPITAL ENCOUNTER (OUTPATIENT)
Dept: ONCOLOGY | Facility: HOSPITAL | Age: 75
Discharge: HOME | End: 2025-08-26
Admitting: INTERNAL MEDICINE
Payer: MEDICARE

## 2025-08-26 DIAGNOSIS — Z45.2 ENCOUNTER FOR CARE RELATED TO PORT-A-CATH: Primary | ICD-10-CM

## 2025-08-26 DIAGNOSIS — C25.0 MALIGNANT TUMOR OF HEAD OF PANCREAS: ICD-10-CM

## 2025-08-26 LAB
ALBUMIN SERPL-MCNC: 3.5 G/DL (ref 3.5–5.2)
ALBUMIN/GLOB SERPL: 1.5 G/DL
ALP SERPL-CCNC: 149 U/L (ref 39–117)
ALT SERPL W P-5'-P-CCNC: 19 U/L (ref 1–33)
ANION GAP SERPL CALCULATED.3IONS-SCNC: 8.4 MMOL/L (ref 5–15)
AST SERPL-CCNC: 23 U/L (ref 1–32)
BASOPHILS # BLD AUTO: 0.04 10*3/MM3 (ref 0–0.2)
BASOPHILS NFR BLD AUTO: 0.6 % (ref 0–1.5)
BILIRUB SERPL-MCNC: 0.3 MG/DL (ref 0–1.2)
BUN SERPL-MCNC: 13 MG/DL (ref 8–23)
BUN/CREAT SERPL: 24.1 (ref 7–25)
CALCIUM SPEC-SCNC: 8.5 MG/DL (ref 8.6–10.5)
CANCER AG19-9 SERPL-ACNC: 486 U/ML
CHLORIDE SERPL-SCNC: 103 MMOL/L (ref 98–107)
CO2 SERPL-SCNC: 22.6 MMOL/L (ref 22–29)
CREAT SERPL-MCNC: 0.54 MG/DL (ref 0.57–1)
DEPRECATED RDW RBC AUTO: 62.8 FL (ref 37–54)
EGFRCR SERPLBLD CKD-EPI 2021: 95.6 ML/MIN/1.73
EOSINOPHIL # BLD AUTO: 0.22 10*3/MM3 (ref 0–0.4)
EOSINOPHIL NFR BLD AUTO: 3.3 % (ref 0.3–6.2)
ERYTHROCYTE [DISTWIDTH] IN BLOOD BY AUTOMATED COUNT: 17.1 % (ref 12.3–15.4)
GLOBULIN UR ELPH-MCNC: 2.3 GM/DL
GLUCOSE SERPL-MCNC: 140 MG/DL (ref 65–99)
HCT VFR BLD AUTO: 32.2 % (ref 34–46.6)
HGB BLD-MCNC: 9.9 G/DL (ref 12–15.9)
IMM GRANULOCYTES # BLD AUTO: 0.01 10*3/MM3 (ref 0–0.05)
IMM GRANULOCYTES NFR BLD AUTO: 0.1 % (ref 0–0.5)
LYMPHOCYTES # BLD AUTO: 1.25 10*3/MM3 (ref 0.7–3.1)
LYMPHOCYTES NFR BLD AUTO: 18.7 % (ref 19.6–45.3)
MCH RBC QN AUTO: 30.4 PG (ref 26.6–33)
MCHC RBC AUTO-ENTMCNC: 30.7 G/DL (ref 31.5–35.7)
MCV RBC AUTO: 98.8 FL (ref 79–97)
MONOCYTES # BLD AUTO: 0.84 10*3/MM3 (ref 0.1–0.9)
MONOCYTES NFR BLD AUTO: 12.5 % (ref 5–12)
NEUTROPHILS NFR BLD AUTO: 4.34 10*3/MM3 (ref 1.7–7)
NEUTROPHILS NFR BLD AUTO: 64.8 % (ref 42.7–76)
PLATELET # BLD AUTO: 174 10*3/MM3 (ref 140–450)
PMV BLD AUTO: 10 FL (ref 6–12)
POTASSIUM SERPL-SCNC: 4.2 MMOL/L (ref 3.5–5.2)
PROT SERPL-MCNC: 5.8 G/DL (ref 6–8.5)
RBC # BLD AUTO: 3.26 10*6/MM3 (ref 3.77–5.28)
SODIUM SERPL-SCNC: 134 MMOL/L (ref 136–145)
WBC NRBC COR # BLD AUTO: 6.7 10*3/MM3 (ref 3.4–10.8)

## 2025-08-26 PROCEDURE — 80053 COMPREHEN METABOLIC PANEL: CPT | Performed by: INTERNAL MEDICINE

## 2025-08-26 PROCEDURE — 36591 DRAW BLOOD OFF VENOUS DEVICE: CPT

## 2025-08-26 PROCEDURE — 85025 COMPLETE CBC W/AUTO DIFF WBC: CPT | Performed by: INTERNAL MEDICINE

## 2025-08-26 PROCEDURE — 86301 IMMUNOASSAY TUMOR CA 19-9: CPT | Performed by: INTERNAL MEDICINE

## 2025-08-26 PROCEDURE — 25010000002 HEPARIN LOCK FLUSH PER 10 UNITS: Performed by: INTERNAL MEDICINE

## 2025-08-26 RX ORDER — HEPARIN SODIUM (PORCINE) LOCK FLUSH IV SOLN 100 UNIT/ML 100 UNIT/ML
500 SOLUTION INTRAVENOUS AS NEEDED
Status: CANCELLED | OUTPATIENT
Start: 2025-08-26

## 2025-08-26 RX ORDER — HEPARIN SODIUM (PORCINE) LOCK FLUSH IV SOLN 100 UNIT/ML 100 UNIT/ML
500 SOLUTION INTRAVENOUS AS NEEDED
Status: DISCONTINUED | OUTPATIENT
Start: 2025-08-26 | End: 2025-08-27 | Stop reason: HOSPADM

## 2025-08-26 RX ORDER — SODIUM CHLORIDE 0.9 % (FLUSH) 0.9 %
10 SYRINGE (ML) INJECTION AS NEEDED
Status: CANCELLED | OUTPATIENT
Start: 2025-08-26

## 2025-08-26 RX ORDER — SODIUM CHLORIDE 0.9 % (FLUSH) 0.9 %
20 SYRINGE (ML) INJECTION AS NEEDED
Status: CANCELLED | OUTPATIENT
Start: 2025-08-26

## 2025-08-26 RX ORDER — SODIUM CHLORIDE 0.9 % (FLUSH) 0.9 %
10 SYRINGE (ML) INJECTION AS NEEDED
Status: DISCONTINUED | OUTPATIENT
Start: 2025-08-26 | End: 2025-08-27 | Stop reason: HOSPADM

## 2025-08-26 RX ADMIN — Medication 500 UNITS: at 11:05

## 2025-08-26 RX ADMIN — Medication 10 ML: at 11:05

## 2025-08-28 ENCOUNTER — HOSPITAL ENCOUNTER (OUTPATIENT)
Dept: ONCOLOGY | Facility: HOSPITAL | Age: 75
Discharge: HOME | End: 2025-08-28
Admitting: INTERNAL MEDICINE
Payer: MEDICARE

## 2025-08-28 VITALS
SYSTOLIC BLOOD PRESSURE: 125 MMHG | HEART RATE: 93 BPM | HEIGHT: 62 IN | TEMPERATURE: 97.4 F | BODY MASS INDEX: 19.32 KG/M2 | WEIGHT: 105 LBS | RESPIRATION RATE: 16 BRPM | DIASTOLIC BLOOD PRESSURE: 55 MMHG

## 2025-08-28 DIAGNOSIS — C25.0 MALIGNANT TUMOR OF HEAD OF PANCREAS: Primary | ICD-10-CM

## 2025-08-28 DIAGNOSIS — Z45.2 ENCOUNTER FOR CARE RELATED TO PORT-A-CATH: ICD-10-CM

## 2025-08-28 PROCEDURE — 25010000002 PACLITAXEL PROTEIN-BOUND PART PER 1 MG: Performed by: INTERNAL MEDICINE

## 2025-08-28 PROCEDURE — 25810000003 SODIUM CHLORIDE 0.9 % SOLUTION 250 ML FLEX CONT: Performed by: INTERNAL MEDICINE

## 2025-08-28 PROCEDURE — 25010000002 HEPARIN LOCK FLUSH PER 10 UNITS: Performed by: INTERNAL MEDICINE

## 2025-08-28 PROCEDURE — 96375 TX/PRO/DX INJ NEW DRUG ADDON: CPT

## 2025-08-28 PROCEDURE — 25810000003 SODIUM CHLORIDE 0.9 % SOLUTION: Performed by: INTERNAL MEDICINE

## 2025-08-28 PROCEDURE — 96413 CHEMO IV INFUSION 1 HR: CPT

## 2025-08-28 PROCEDURE — 25010000002 DEXAMETHASONE SODIUM PHOSPHATE 100 MG/10ML SOLUTION: Performed by: INTERNAL MEDICINE

## 2025-08-28 PROCEDURE — 96417 CHEMO IV INFUS EACH ADDL SEQ: CPT

## 2025-08-28 PROCEDURE — 25010000002 GEMCITABINE 200 MG/5.26ML SOLUTION 5.26 ML VIAL: Performed by: INTERNAL MEDICINE

## 2025-08-28 PROCEDURE — 25010000002 GEMCITABINE 1 GM/26.3ML SOLUTION 26.3 ML VIAL: Performed by: INTERNAL MEDICINE

## 2025-08-28 RX ORDER — SODIUM CHLORIDE 0.9 % (FLUSH) 0.9 %
10 SYRINGE (ML) INJECTION AS NEEDED
OUTPATIENT
Start: 2025-08-28

## 2025-08-28 RX ORDER — SODIUM CHLORIDE 0.9 % (FLUSH) 0.9 %
20 SYRINGE (ML) INJECTION AS NEEDED
OUTPATIENT
Start: 2025-08-28

## 2025-08-28 RX ORDER — PACLITAXEL 100 MG/20ML
90 INJECTION, POWDER, LYOPHILIZED, FOR SUSPENSION INTRAVENOUS ONCE
Status: COMPLETED | OUTPATIENT
Start: 2025-08-28 | End: 2025-08-28

## 2025-08-28 RX ORDER — HEPARIN SODIUM (PORCINE) LOCK FLUSH IV SOLN 100 UNIT/ML 100 UNIT/ML
500 SOLUTION INTRAVENOUS AS NEEDED
Status: DISCONTINUED | OUTPATIENT
Start: 2025-08-28 | End: 2025-08-30 | Stop reason: HOSPADM

## 2025-08-28 RX ORDER — SODIUM CHLORIDE 9 MG/ML
20 INJECTION, SOLUTION INTRAVENOUS ONCE
Status: COMPLETED | OUTPATIENT
Start: 2025-08-28 | End: 2025-08-28

## 2025-08-28 RX ORDER — HEPARIN SODIUM (PORCINE) LOCK FLUSH IV SOLN 100 UNIT/ML 100 UNIT/ML
500 SOLUTION INTRAVENOUS AS NEEDED
OUTPATIENT
Start: 2025-08-28

## 2025-08-28 RX ADMIN — Medication 500 UNITS: at 14:03

## 2025-08-28 RX ADMIN — SODIUM CHLORIDE 20 ML/HR: 9 INJECTION, SOLUTION INTRAVENOUS at 11:49

## 2025-08-28 RX ADMIN — PACLITAXEL 135 MG: 100 INJECTION, POWDER, LYOPHILIZED, FOR SUSPENSION INTRAVENOUS at 12:43

## 2025-08-28 RX ADMIN — DEXAMETHASONE SODIUM PHOSPHATE 12 MG: 10 INJECTION, SOLUTION INTRAMUSCULAR; INTRAVENOUS at 11:55

## 2025-08-28 RX ADMIN — GEMCITABINE HYDROCHLORIDE 1150 MG: 38 INJECTION, SOLUTION INTRAVENOUS at 13:26
